# Patient Record
Sex: MALE | Race: BLACK OR AFRICAN AMERICAN | Employment: OTHER | URBAN - METROPOLITAN AREA
[De-identification: names, ages, dates, MRNs, and addresses within clinical notes are randomized per-mention and may not be internally consistent; named-entity substitution may affect disease eponyms.]

---

## 2018-03-07 ENCOUNTER — HOSPITAL ENCOUNTER (OUTPATIENT)
Dept: LAB | Age: 63
Discharge: HOME | End: 2018-03-07
Payer: MEDICARE

## 2018-03-07 ENCOUNTER — HOSPITAL ENCOUNTER (OUTPATIENT)
Dept: RADIATION THERAPY | Age: 63
Discharge: HOME | End: 2018-03-07
Payer: MEDICARE

## 2018-03-07 ENCOUNTER — HOSPITAL ENCOUNTER (OUTPATIENT)
Dept: PREADMISSION TESTING | Age: 63
Discharge: HOME | End: 2018-03-07
Payer: MEDICARE

## 2018-03-07 DIAGNOSIS — C61 MALIGNANT NEOPLASM OF PROSTATE (HCC): ICD-10-CM

## 2018-03-07 DIAGNOSIS — N39.44 NOCTURNAL ENURESIS: ICD-10-CM

## 2018-03-07 LAB
ANION GAP SERPL CALC-SCNC: 5 MMOL/L (ref 3–18)
APPEARANCE UR: CLEAR
BACTERIA URNS QL MICRO: NEGATIVE /HPF
BASOPHILS # BLD: 0 K/UL (ref 0–0.1)
BASOPHILS NFR BLD: 0 % (ref 0–3)
BILIRUB UR QL: NEGATIVE
BLASTS NFR BLD MANUAL: 0 %
BUN SERPL-MCNC: 21 MG/DL (ref 7–18)
BUN/CREAT SERPL: 14 (ref 12–20)
CALCIUM SERPL-MCNC: 8.7 MG/DL (ref 8.5–10.1)
CHLORIDE SERPL-SCNC: 106 MMOL/L (ref 100–108)
CO2 SERPL-SCNC: 31 MMOL/L (ref 21–32)
COLOR UR: YELLOW
CREAT SERPL-MCNC: 1.47 MG/DL (ref 0.6–1.3)
DIFFERENTIAL METHOD BLD: ABNORMAL
EOSINOPHIL # BLD: 0.1 K/UL (ref 0–0.4)
EOSINOPHIL NFR BLD: 1 % (ref 0–5)
EPITH CASTS URNS QL MICRO: ABNORMAL /LPF (ref 0–5)
ERYTHROCYTE [DISTWIDTH] IN BLOOD BY AUTOMATED COUNT: 14 % (ref 11.6–14.5)
GLUCOSE SERPL-MCNC: 284 MG/DL (ref 74–99)
GLUCOSE UR STRIP.AUTO-MCNC: >1000 MG/DL
HCT VFR BLD AUTO: 45.2 % (ref 36–48)
HGB BLD-MCNC: 15 G/DL (ref 13–16)
HGB UR QL STRIP: ABNORMAL
KETONES UR QL STRIP.AUTO: NEGATIVE MG/DL
LEUKOCYTE ESTERASE UR QL STRIP.AUTO: NEGATIVE
LYMPHOCYTES # BLD: 1.9 K/UL (ref 0.8–3.5)
LYMPHOCYTES NFR BLD: 28 % (ref 20–51)
MANUAL DIFFERENTIAL PERFORMED BLD QL: ABNORMAL
MCH RBC QN AUTO: 31.3 PG (ref 24–34)
MCHC RBC AUTO-ENTMCNC: 33.2 G/DL (ref 31–37)
MCV RBC AUTO: 94.2 FL (ref 74–97)
METAMYELOCYTES NFR BLD MANUAL: 0 %
MONOCYTES # BLD: 0.8 K/UL (ref 0–1)
MONOCYTES NFR BLD: 11 % (ref 2–9)
MYELOCYTES NFR BLD MANUAL: 0 %
NEUTS BAND NFR BLD MANUAL: 0 % (ref 0–5)
NEUTS SEG # BLD: 4.1 K/UL (ref 1.8–8)
NEUTS SEG NFR BLD: 60 % (ref 42–75)
NITRITE UR QL STRIP.AUTO: NEGATIVE
PH UR STRIP: 5 [PH] (ref 5–8)
PLATELET # BLD AUTO: 214 K/UL (ref 135–420)
PLATELET COMMENTS,PCOM: ABNORMAL
PMV BLD AUTO: 10.4 FL (ref 9.2–11.8)
POTASSIUM SERPL-SCNC: 4.4 MMOL/L (ref 3.5–5.5)
PROMYELOCYTES NFR BLD MANUAL: 0 %
PROT UR STRIP-MCNC: NEGATIVE MG/DL
RBC # BLD AUTO: 4.8 M/UL (ref 4.7–5.5)
RBC #/AREA URNS HPF: 0 /HPF (ref 0–5)
RBC MORPH BLD: ABNORMAL
SODIUM SERPL-SCNC: 142 MMOL/L (ref 136–145)
SP GR UR REFRACTOMETRY: >1.03 (ref 1–1.03)
UROBILINOGEN UR QL STRIP.AUTO: 0.2 EU/DL (ref 0.2–1)
WBC # BLD AUTO: 6.9 K/UL (ref 4.6–13.2)
WBC URNS QL MICRO: ABNORMAL /HPF (ref 0–4)

## 2018-03-07 PROCEDURE — 84154 ASSAY OF PSA FREE: CPT | Performed by: RADIOLOGY

## 2018-03-07 PROCEDURE — 87086 URINE CULTURE/COLONY COUNT: CPT | Performed by: RADIOLOGY

## 2018-03-07 PROCEDURE — 85027 COMPLETE CBC AUTOMATED: CPT | Performed by: RADIOLOGY

## 2018-03-07 PROCEDURE — 80048 BASIC METABOLIC PNL TOTAL CA: CPT | Performed by: RADIOLOGY

## 2018-03-07 PROCEDURE — 81001 URINALYSIS AUTO W/SCOPE: CPT | Performed by: RADIOLOGY

## 2018-03-07 PROCEDURE — 84402 ASSAY OF FREE TESTOSTERONE: CPT | Performed by: RADIOLOGY

## 2018-03-07 PROCEDURE — 36415 COLL VENOUS BLD VENIPUNCTURE: CPT | Performed by: RADIOLOGY

## 2018-03-07 PROCEDURE — 93005 ELECTROCARDIOGRAM TRACING: CPT

## 2018-03-07 PROCEDURE — 99211 OFF/OP EST MAY X REQ PHY/QHP: CPT

## 2018-03-08 LAB
ATRIAL RATE: 76 BPM
CALCULATED P AXIS, ECG09: 58 DEGREES
CALCULATED R AXIS, ECG10: -83 DEGREES
CALCULATED T AXIS, ECG11: 90 DEGREES
DIAGNOSIS, 93000: NORMAL
P-R INTERVAL, ECG05: 166 MS
PSA FREE MFR SERPL: 15 %
PSA FREE SERPL-MCNC: 0.51 NG/ML
PSA SERPL-MCNC: 3.4 NG/ML (ref 0–4)
Q-T INTERVAL, ECG07: 486 MS
QRS DURATION, ECG06: 206 MS
QTC CALCULATION (BEZET), ECG08: 546 MS
VENTRICULAR RATE, ECG03: 76 BPM

## 2018-03-09 LAB
BACTERIA SPEC CULT: NORMAL
SERVICE CMNT-IMP: NORMAL

## 2018-03-11 LAB
TESTOST FREE SERPL-MCNC: 8.2 PG/ML (ref 6.6–18.1)
TESTOST SERPL-MCNC: 199.3 NG/DL (ref 264–916)

## 2018-04-16 ENCOUNTER — HOSPITAL ENCOUNTER (OUTPATIENT)
Dept: LAB | Age: 63
Discharge: HOME | End: 2018-04-16

## 2019-03-12 ENCOUNTER — OFFICE VISIT (OUTPATIENT)
Dept: INTERNAL MEDICINE CLINIC | Age: 64
End: 2019-03-12

## 2019-03-12 ENCOUNTER — DOCUMENTATION ONLY (OUTPATIENT)
Dept: INTERNAL MEDICINE CLINIC | Age: 64
End: 2019-03-12

## 2019-03-12 VITALS
OXYGEN SATURATION: 96 % | TEMPERATURE: 96.3 F | WEIGHT: 294 LBS | BODY MASS INDEX: 41.16 KG/M2 | SYSTOLIC BLOOD PRESSURE: 144 MMHG | HEIGHT: 71 IN | HEART RATE: 68 BPM | DIASTOLIC BLOOD PRESSURE: 78 MMHG | RESPIRATION RATE: 17 BRPM

## 2019-03-12 DIAGNOSIS — I10 ESSENTIAL HYPERTENSION: ICD-10-CM

## 2019-03-12 DIAGNOSIS — E11.69 TYPE 2 DIABETES MELLITUS WITH OTHER SPECIFIED COMPLICATION, WITH LONG-TERM CURRENT USE OF INSULIN (HCC): Primary | ICD-10-CM

## 2019-03-12 DIAGNOSIS — I50.9 CONGESTIVE HEART FAILURE, UNSPECIFIED HF CHRONICITY, UNSPECIFIED HEART FAILURE TYPE (HCC): ICD-10-CM

## 2019-03-12 DIAGNOSIS — Z79.4 TYPE 2 DIABETES MELLITUS WITH OTHER SPECIFIED COMPLICATION, WITH LONG-TERM CURRENT USE OF INSULIN (HCC): Primary | ICD-10-CM

## 2019-03-12 DIAGNOSIS — Z95.0 PACEMAKER: ICD-10-CM

## 2019-03-12 DIAGNOSIS — C61 PROSTATE CANCER (HCC): ICD-10-CM

## 2019-03-12 RX ORDER — INSULIN GLARGINE 100 [IU]/ML
60 INJECTION, SOLUTION SUBCUTANEOUS
Qty: 15 ADJUSTABLE DOSE PRE-FILLED PEN SYRINGE | Refills: 3
Start: 2019-03-12 | End: 2019-08-12 | Stop reason: SDUPTHER

## 2019-03-12 RX ORDER — TADALAFIL 5 MG/1
TABLET, FILM COATED ORAL
COMMUNITY
Start: 2019-02-27 | End: 2019-10-08

## 2019-03-12 RX ORDER — BUMETANIDE 1 MG/1
1 TABLET ORAL 2 TIMES DAILY
Qty: 180 TAB | Refills: 3
Start: 2019-03-12 | End: 2019-05-07 | Stop reason: SDUPTHER

## 2019-03-12 RX ORDER — DOCUSATE SODIUM 100 MG/1
100 CAPSULE, LIQUID FILLED ORAL DAILY
Qty: 30 CAP | Refills: 2
Start: 2019-03-12 | End: 2019-06-10

## 2019-03-12 NOTE — PROGRESS NOTES
HISTORY OF PRESENT ILLNESS  Huy Degroot is a 75 y.o. male. 76 yo male here to establish care. CHF/pacer. Recent hospital stay due to fluid overload. Notes he had not been as compliant with diuretic, but doing better with change in Bumex from 2mg daily to 1mg BID. Still with some LE edema. DM: followed by endo. Believes last A1c was in 7% range. Avg glc for last 30 days ~ 140 per his monitor. HTN typically controlled on current medication. H/o prostate CA tx with proton therapy. H/o gout, no recent flares. SH: former smoker, 30 pack years. UTD with colo      Establish Care   Pertinent negatives include no chest pain, no headaches and no shortness of breath. Review of Systems   Constitutional: Negative for chills, fever and weight loss. HENT: Negative for congestion and ear pain. Eyes: Negative for blurred vision and pain. Respiratory: Negative for cough and shortness of breath. Cardiovascular: Positive for leg swelling (stable). Negative for chest pain and palpitations. Gastrointestinal: Negative for nausea and vomiting. Genitourinary: Negative for frequency and urgency. Musculoskeletal: Negative for joint pain and myalgias. Skin: Negative for itching and rash. Neurological: Negative for dizziness, tingling and headaches. Psychiatric/Behavioral: Negative for depression. The patient is not nervous/anxious. Past Medical History:   Diagnosis Date    [facility]fib ([facility]     Diabetes ([facility]     Elevated PSA     Erectile disorder due to medical condition in male    [facility] Gout     High cholesterol     Hx-TIA (transient ischemic attack)     Hypertension     Pacemaker     Prostate cancer ([facility] 02/08/2018    Clinical staging T1c [place] score 7 and 6. pre-bx PSA 4.65 ng/mL. Past Surgical History:   Procedure Laterality Date    HX CIRCUMCISION  2014    HX COLONOSCOPY      HX OTHER SURGICAL Right 2011    fractured ankle.  Has 1 screw    HX PACEMAKER PLACEMENT  2012    HX UROLOGICAL  2018    PNBx. TRUS Vol 54 grams. [place] 3+4 in RLM. [place] 3+3 in /12 cores. Dr. Leilani Ritchie. Current Outpatient Medications on File Prior to Visit   Medication Sig Dispense Refill    CIALIS 5 mg tablet       tamsulosin (FLOMAX) 0.4 mg capsule Take 0.4 mg by mouth daily.  SITagliptin-metFORMIN (JANUMET)  mg per tablet Take 1 Tab by mouth two (2) times daily (with meals).  empagliflozin (JARDIANCE) 25 mg tablet Take 25 mg by mouth daily.  allopurinol (ZYLOPRIM) 100 mg tablet Take 100 mg by mouth daily.  APIDRA SOLOSTAR 100 unit/mL pen Indications: sliding scale      losartan (COZAAR) 100 mg tablet Take 100 mg by mouth daily.  metoprolol succinate (TOPROL-XL) 100 mg tablet Take 100 mg by mouth daily.  XARELTO 20 mg tab tablet Take 20 mg by mouth daily (with breakfast).  simvastatin (ZOCOR) 10 mg tablet Take 10 mg by mouth nightly.  budesonide-formoterol (SYMBICORT) 80-4.5 mcg/actuation HFAA Take 2 Puffs by inhalation two (2) times a day.  IPRATROPIUM/ALBUTEROL SULFATE (COMBIVENT IN) Take 2 Puffs by inhalation as needed.  OTHER,NON-FORMULARY, 1 mL by IntraCAVernosal route as needed. 5 Each 5     No current facility-administered medications on file prior to visit. No Known Allergies  Social History     Tobacco Use    Smoking status: Former Smoker     Types: Cigarettes     Last attempt to quit: 2014     Years since quittin.1    Smokeless tobacco: Never Used   Substance Use Topics    Alcohol use: Yes     Alcohol/week: 0.6 oz     Types: 1 Shots of liquor per week     Comment: occasionally    Drug use: No     Family History   Problem Relation Age of Onset    Stroke Mother     Heart Attack Father      Physical Exam   Constitutional: He appears well-developed and well-nourished. No distress.    /78   Pulse 68   Temp 96.3 °F (35.7 °C) (Oral)   Resp 17   Ht 5' 11\" (1.803 m)   Wt 294 lb (133.4 kg)   SpO2 96%   BMI 41.00 kg/m²      Eyes: EOM are normal. Right eye exhibits no discharge. Left eye exhibits no discharge. No scleral icterus. Neck: Neck supple. Cardiovascular: Normal rate, regular rhythm and normal heart sounds. Exam reveals no gallop and no friction rub. No murmur heard. Pulmonary/Chest: Effort normal and breath sounds normal. No respiratory distress. He has no wheezes. He has no rales. Abdominal: Soft. He exhibits no distension. There is no tenderness. There is no rebound and no guarding. Musculoskeletal: He exhibits edema (trace LE). He exhibits no tenderness. Lymphadenopathy:     He has no cervical adenopathy. Neurological: He is alert. He exhibits normal muscle tone. Skin: Skin is warm and dry. Psychiatric: He has a normal mood and affect. Lab Results   Component Value Date/Time    Prostate Specific Ag 1.69 11/16/2018 10:14 AM    Prostate Specific Ag 3.1 04/30/2018    Prostate Specific Ag 3.4 03/07/2018 04:12 PM    Prostate Specific Ag 4.08 12/14/2017 10:35 AM    Prostate Specific Ag 4.65 (H) 11/17/2017 11:03 AM     Lab Results   Component Value Date/Time    Sodium 142 03/07/2018 04:12 PM    Potassium 4.4 03/07/2018 04:12 PM    Chloride 106 03/07/2018 04:12 PM    CO2 31 03/07/2018 04:12 PM    Anion gap 5 03/07/2018 04:12 PM    Glucose 284 (H) 03/07/2018 04:12 PM    BUN 21 (H) 03/07/2018 04:12 PM    Creatinine 1.47 (H) 03/07/2018 04:12 PM    BUN/Creatinine ratio 14 03/07/2018 04:12 PM    GFR est AA 58 (L) 03/07/2018 04:12 PM    GFR est non-AA 48 (L) 03/07/2018 04:12 PM    Calcium 8.7 03/07/2018 04:12 PM   No results found for: HBA1C, HGBE8, KFX4CXAO, CVD9PQMH  Lab Results   Component Value Date/Time    WBC 6.9 03/07/2018 04:12 PM    HGB 15.0 03/07/2018 04:12 PM    HCT 45.2 03/07/2018 04:12 PM    PLATELET 790 21/82/1298 04:12 PM    MCV 94.2 03/07/2018 04:12 PM     ASSESSMENT and PLAN    ICD-10-CM ICD-9-CM    1.  Type 2 diabetes mellitus with other specified complication, with long-term current use of insulin ([facility] E11.69 250.80     Z79.4 V58.67    2. Essential hypertension I10 401.9    3. Prostate cancer ([facility] C61 185    4. Pacemaker Z95.0 V45.01    5. Congestive heart failure, unspecified HF chronicity, unspecified heart failure type (HCC) I50.9 428.0    6. BMI 40.0-44.9, adult [facility]) Z68.41 V85.41      Will obtain old records  Continue with current medication for now. Discussed low sodium diet, daily weights. DM stable on home monitoring. Continue to work on dietary changes, weight loss. F/u with cardiology as planned.

## 2019-03-12 NOTE — PROGRESS NOTES
Pharmacist Note: Immunization Update    Patient: Jones Fair (5 y.o., 1955)     Patient's immunization history was updated to reflect information contained in the [place] and/or outside immunization/pharmacy records were reconciled within [facility]. Health Maintenance schedule updated when appropriate.     Current immunizations now reflect:       Immunization History   Administered Date(s) Administered    Influenza Vaccine 02/02/2014    Pneumococcal Polysaccharide (PPSV-23) 02/03/2014       Danny Espinosa, PharmD, BCACP

## 2019-03-12 NOTE — PATIENT INSTRUCTIONS
Low Sodium Diet (2,000 Milligram): Care Instructions  Your Care Instructions    Too much sodium causes your body to hold on to extra water. This can raise your blood pressure and force your heart and kidneys to work harder. In very serious cases, this could cause you to be put in the hospital. It might even be life-threatening. By limiting sodium, you will feel better and lower your risk of serious problems. The most common source of sodium is salt. People get most of the salt in their diet from canned, prepared, and packaged foods. Fast food and restaurant meals also are very high in sodium. Your doctor will probably limit your sodium to less than 2,000 milligrams (mg) a day. This limit counts all the sodium in prepared and packaged foods and any salt you add to your food. Follow-up care is a key part of your treatment and safety. Be sure to make and go to all appointments, and call your doctor if you are having problems. It's also a good idea to know your test results and keep a list of the medicines you take. How can you care for yourself at home? Read food labels  · Read labels on cans and food packages. The labels tell you how much sodium is in each serving. Make sure that you look at the serving size. If you eat more than the serving size, you have eaten more sodium. · Food labels also tell you the Percent Daily Value for sodium. Choose products with low Percent Daily Values for sodium. · Be aware that sodium can come in forms other than salt, including monosodium glutamate (MSG), sodium citrate, and sodium bicarbonate (baking soda). MSG is often added to Asian food. When you eat out, you can sometimes ask for food without MSG or added salt. Buy low-sodium foods  · Buy foods that are labeled \"unsalted\" (no salt added), \"sodium-free\" (less than 5 mg of sodium per serving), or \"low-sodium\" (less than 140 mg of sodium per serving).  Foods labeled \"reduced-sodium\" and \"light sodium\" may still have too much sodium. Be sure to read the label to see how much sodium you are getting. · Buy fresh vegetables, or frozen vegetables without added sauces. Buy low-sodium versions of canned vegetables, soups, and other canned goods. Prepare low-sodium meals  · Cut back on the amount of salt you use in cooking. This will help you adjust to the taste. Do not add salt after cooking. One teaspoon of salt has about 2,300 mg of sodium. · Take the salt shaker off the table. · Flavor your food with garlic, lemon juice, onion, vinegar, herbs, and spices. Do not use soy sauce, lite soy sauce, steak sauce, onion salt, garlic salt, celery salt, mustard, or ketchup on your food. · Use low-sodium salad dressings, sauces, and ketchup. Or make your own salad dressings and sauces without adding salt. · Use less salt (or none) when recipes call for it. You can often use half the salt a recipe calls for without losing flavor. Other foods such as rice, pasta, and grains do not need added salt. · Rinse canned vegetables, and cook them in fresh water. This removes somebut not allof the salt. · Avoid water that is naturally high in sodium or that has been treated with water softeners, which add sodium. Call your local water company to find out the sodium content of your water supply. If you buy bottled water, read the label and choose a sodium-free brand. Avoid high-sodium foods  · Avoid eating:  ? Smoked, cured, salted, and canned meat, fish, and poultry. ? Ham, samson, hot dogs, and luncheon meats. ? Regular, hard, and processed cheese and regular peanut butter. ? Crackers with salted tops, and other salted snack foods such as pretzels, chips, and salted popcorn. ? Frozen prepared meals, unless labeled low-sodium. ? Canned and dried soups, broths, and bouillon, unless labeled sodium-free or low-sodium. ? Canned vegetables, unless labeled sodium-free or low-sodium. ? Genette Amita fries, pizza, tacos, and other fast foods.   ? Ammy García, olives, ketchup, and other condiments, especially soy sauce, unless labeled sodium-free or low-sodium. Where can you learn more? Go to http://harjinder-serge.info/. Enter V647 in the search box to learn more about \"Low Sodium Diet (2,000 Milligram): Care Instructions. \"  Current as of: March 28, 2018  Content Version: 11.9  © 8631-6852 [employer], comment.com. Care instructions adapted under license by [employer] (which disclaims liability or warranty for this information). If you have questions about a medical condition or this instruction, always ask your healthcare professional. [address] any warranty or liability for your use of this information.

## 2019-03-12 NOTE — PROGRESS NOTES
ROOM #  [id number]  Identified pt with two pt identifiers(name and ). Reviewed record in preparation for visit and have obtained necessary documentation. Chief Complaint   Patient presents with   [address] preferred language for health care discussion is english/other. Is the patient using any DME equipment during OV? NO    Patricia Gonzales is due for:  Health Maintenance Due   Topic    Hepatitis C Screening     HEMOGLOBIN A1C Q6M     LIPID PANEL Q1     FOOT EXAM Q1     MICROALBUMIN Q1     EYE EXAM RETINAL OR DILATED     DTaP/Tdap/Td series (1 - Tdap)    Shingrix Vaccine Age 50> (1 of 2)    FOBT Q 1 YEAR AGE 54-65     GLAUCOMA SCREENING Q2Y     Pneumococcal 65+ Low/Medium Risk (1 of 2 - PCV13)    MEDICARE YEARLY EXAM     Influenza Age 5 to Adult      Health Maintenance reviewed and discussed per provider  Please order/place referral if appropriate. Advance Directive:  1. Do you have an advance directive in place? Patient Reply: NO    2. If not, would you like material regarding how to put one in place? NO    Coordination of Care:  1. Have you been to the ER, urgent care clinic since your last visit? Hospitalized since your last visit? Yes [facility]  In 2019 for breathing problem     2. Have you seen or consulted any other health care providers outside of the [address] [place] [address] since your last visit? Include any pap smears or colon screening. NO    Patient is accompanied by wife I have received verbal consent from Patricia Gonzales to discuss any/all medical information while they are present in the room.     Learning Assessment:  Learning Assessment 3/12/2019   PRIMARY LEARNER Patient   HIGHEST LEVEL OF EDUCATION - PRIMARY LEARNER  GRADUATED HIGH SCHOOL OR GED   BARRIERS PRIMARY LEARNER NONE   CO-LEARNER CAREGIVER No   PRIMARY LANGUAGE ENGLISH   LEARNER PREFERENCE PRIMARY READING   ANSWERED BY patient   RELATIONSHIP SELF     Depression Screening:  3 most recent PHQ Screens 3/12/2019   Little interest or pleasure in doing things Not at all   Feeling down, depressed, irritable, or hopeless Not at all   Total Score PHQ 2 0     Abuse Screening:  Abuse Screening Questionnaire 3/12/2019   Do you ever feel afraid of your partner? N   Are you in a relationship with someone who physically or mentally threatens you? N   Is it safe for you to go home? Y     Fall Risk  Fall Risk Assessment, last 12 mths 3/12/2019   Able to walk? Yes   Fall in past 12 months?  No

## 2019-05-07 ENCOUNTER — OFFICE VISIT (OUTPATIENT)
Dept: INTERNAL MEDICINE CLINIC | Age: 64
End: 2019-05-07

## 2019-05-07 ENCOUNTER — TELEPHONE (OUTPATIENT)
Dept: INTERNAL MEDICINE CLINIC | Age: 64
End: 2019-05-07

## 2019-05-07 VITALS
RESPIRATION RATE: 18 BRPM | HEART RATE: 83 BPM | BODY MASS INDEX: 40.74 KG/M2 | DIASTOLIC BLOOD PRESSURE: 70 MMHG | TEMPERATURE: 97.9 F | WEIGHT: 291 LBS | OXYGEN SATURATION: 90 % | SYSTOLIC BLOOD PRESSURE: 123 MMHG | HEIGHT: 71 IN

## 2019-05-07 DIAGNOSIS — I10 ESSENTIAL HYPERTENSION: ICD-10-CM

## 2019-05-07 DIAGNOSIS — I50.9 CONGESTIVE HEART FAILURE, UNSPECIFIED HF CHRONICITY, UNSPECIFIED HEART FAILURE TYPE (HCC): ICD-10-CM

## 2019-05-07 DIAGNOSIS — J44.9 CHRONIC OBSTRUCTIVE PULMONARY DISEASE, UNSPECIFIED COPD TYPE (HCC): Primary | ICD-10-CM

## 2019-05-07 RX ORDER — BUDESONIDE AND FORMOTEROL FUMARATE DIHYDRATE 80; 4.5 UG/1; UG/1
2 AEROSOL RESPIRATORY (INHALATION) 2 TIMES DAILY
Qty: 3 INHALER | Refills: 3 | Status: SHIPPED | OUTPATIENT
Start: 2019-05-07 | End: 2019-06-05 | Stop reason: ALTCHOICE

## 2019-05-07 RX ORDER — BUMETANIDE 1 MG/1
1 TABLET ORAL 2 TIMES DAILY
Qty: 180 TAB | Refills: 3 | Status: SHIPPED | OUTPATIENT
Start: 2019-05-07 | End: 2022-04-25 | Stop reason: SDUPTHER

## 2019-05-07 RX ORDER — SPIRONOLACTONE 25 MG/1
25 TABLET ORAL 2 TIMES DAILY
Qty: 180 TAB | Refills: 3 | Status: SHIPPED | OUTPATIENT
Start: 2019-05-07 | End: 2020-10-06 | Stop reason: SDUPTHER

## 2019-05-07 RX ORDER — TAMSULOSIN HYDROCHLORIDE 0.4 MG/1
0.4 CAPSULE ORAL DAILY
Qty: 90 CAP | Refills: 3 | Status: SHIPPED | OUTPATIENT
Start: 2019-05-07 | End: 2020-05-28 | Stop reason: SDUPTHER

## 2019-05-07 NOTE — TELEPHONE ENCOUNTER
Received call from [address] [place] [address]. [facility]) wanting to get a VO to change the directions of the Combivent. Gave VO to change directions to  1 puff 4x daily, max 6 inhalations per day.

## 2019-05-07 NOTE — PROGRESS NOTES
Rm: 16 
 
Chief Complaint Patient presents with  Medication Refill  Breathing Problem  
  x 1 month Depression Screening: 
3 most recent [facility] Screens 5/7/2019 3/12/2019 Little interest or pleasure in doing things Not at all Not at all Feeling down, depressed, irritable, or hopeless Not at all Not at all Total Score PHQ 2 0 0 Learning Assessment: 
Learning Assessment 3/12/2019 PRIMARY LEARNER Patient HIGHEST LEVEL OF EDUCATION - PRIMARY LEARNER  GRADUATED HIGH SCHOOL OR GED  
BARRIERS PRIMARY LEARNER NONE  
CO-LEARNER CAREGIVER No  
PRIMARY LANGUAGE ENGLISH  
LEARNER PREFERENCE PRIMARY READING  
ANSWERED BY patient RELATIONSHIP SELF Abuse Screening: 
Abuse Screening Questionnaire 3/12/2019 Do you ever feel afraid of your partner? Elana Madison Are you in a relationship with someone who physically or mentally threatens you? Elana Madison Is it safe for you to go home? Edy Estrada Health Maintenance reviewed and discussed per provider: yes Coordination of Care: 1. Have you been to the ER, urgent care clinic since your last visit? Hospitalized since your last visit? no 
 
2. Have you seen or consulted any other health care providers outside of the [address] [place] [address] since your last visit? Include any pap smears or colon screening.  no

## 2019-05-07 NOTE — PROGRESS NOTES
HISTORY OF PRESENT ILLNESS Scott Lacey is a 75 y.o. male. Here for f/u of COPD. Has been SOB since he ran out of his inhalers one month ago. Has been using his wife's but not the same medication. Some increased cough in the morning. No fevers, chills. CHF: needs refills of bumex. Has lost 3 lbs but notes he would like to lose more. Does not weight regularly at home. Last cardiology visit in FEB. No changes at that time. DM followed by [facility] (Dr. Cisco Max). Reports this has been controlled. HTN well controlled at this time. Review of Systems Constitutional: Negative for chills, fever and weight loss. HENT: Negative for congestion and ear pain. Eyes: Negative for blurred vision and pain. Respiratory: Positive for cough (in AM), shortness of breath and wheezing (intermittent). Cardiovascular: Positive for leg swelling. Negative for chest pain and palpitations. Gastrointestinal: Negative for nausea and vomiting. Genitourinary: Negative for frequency and urgency. Musculoskeletal: Negative for joint pain and myalgias. Skin: Negative for itching and rash. Neurological: Negative for dizziness, tingling and headaches. Psychiatric/Behavioral: Negative for depression. The patient is not nervous/anxious. Past Medical History:  
Diagnosis Date  [facility]  Diabetes ([facility]  Elevated PSA  Erectile disorder due to medical condition in male  Gout  High cholesterol  Hx-TIA (transient ischemic attack)  Hypertension  Pacemaker  Prostate cancer ([facility] 02/08/2018 Clinical staging T1c [place] score 7 and 6. pre-bx PSA 4.65 ng/mL. Current Outpatient Medications on File Prior to Visit Medication Sig Dispense Refill  docusate sodium (COLACE) 100 mg capsule Take 1 Cap by mouth daily for 90 days. 30 Cap 2  
 LANTUS SOLOSTAR U-100 INSULIN 100 unit/mL (3 mL) inpn 60 Units by SubCUTAneous route Before breakfast and dinner.  15 Adjustable Dose Pre-filled Pen Syringe 3  
 bumetanide (BUMEX) 1 mg tablet Take 1 Tab by mouth two (2) times a day. 180 Tab 3  
 tamsulosin (FLOMAX) 0.4 mg capsule Take 0.4 mg by mouth daily.  SITagliptin-metFORMIN (JANUMET)  mg per tablet Take 1 Tab by mouth two (2) times daily (with meals).  empagliflozin (JARDIANCE) 25 mg tablet Take 25 mg by mouth daily.  allopurinol (ZYLOPRIM) 100 mg tablet Take 100 mg by mouth daily.  APIDRA SOLOSTAR 100 unit/mL pen Indications: sliding scale  losartan (COZAAR) 100 mg tablet Take 100 mg by mouth daily.  metoprolol succinate (TOPROL-XL) 100 mg tablet Take 100 mg by mouth daily.  XARELTO 20 mg tab tablet Take 20 mg by mouth daily (with breakfast).  simvastatin (ZOCOR) 10 mg tablet Take 10 mg by mouth nightly.  budesonide-formoterol (SYMBICORT) 80-4.5 mcg/actuation HFAA Take 2 Puffs by inhalation two (2) times a day.  IPRATROPIUM/ALBUTEROL SULFATE (COMBIVENT IN) Take 2 Puffs by inhalation as needed.  CIALIS 5 mg tablet  OTHER,NON-FORMULARY, 1 mL by IntraCAVernosal route as needed. 5 Each 5 No current facility-administered medications on file prior to visit. Social History Tobacco Use  Smoking status: Former Smoker Types: Cigarettes Last attempt to quit: 2014 Years since quittin.3  Smokeless tobacco: Never Used Substance Use Topics  Alcohol use: Yes Alcohol/week: 0.6 oz Types: 1 Shots of liquor per week Comment: occasionally  Drug use: No  
 
Physical Exam  
Constitutional: He appears well-developed and well-nourished. No distress. /70 (BP 1 Location: Right arm, BP Patient Position: Sitting)   Pulse 83   Temp 97.9 °F (36.6 °C) (Oral)   Resp 18   Ht 5' 11\" (1.803 m)   Wt 291 lb (132 kg)   SpO2 90%   BMI 40.59 kg/m² Eyes: EOM are normal. Right eye exhibits no discharge. Left eye exhibits no discharge. No scleral icterus. Neck: Neck supple. Cardiovascular: Normal rate, regular rhythm and normal heart sounds. Exam reveals no gallop and no friction rub. No murmur heard. Pulmonary/Chest: Effort normal and breath sounds normal. No respiratory distress. He has no wheezes. He has no rales. Musculoskeletal: He exhibits edema (trace BLE). He exhibits no tenderness. Lymphadenopathy:  
  He has no cervical adenopathy. Neurological: He is alert. He exhibits normal muscle tone. Skin: Skin is warm and dry. Psychiatric: He has a normal mood and affect. Lab Results Component Value Date/Time Sodium 142 03/07/2018 04:12 PM  
 Potassium 4.4 03/07/2018 04:12 PM  
 Chloride 106 03/07/2018 04:12 PM  
 CO2 31 03/07/2018 04:12 PM  
 Anion gap 5 03/07/2018 04:12 PM  
 Glucose 284 (H) 03/07/2018 04:12 PM  
 BUN 21 (H) 03/07/2018 04:12 PM  
 Creatinine 1.47 (H) 03/07/2018 04:12 PM  
 BUN/Creatinine ratio 14 03/07/2018 04:12 PM  
 GFR est AA 58 (L) 03/07/2018 04:12 PM  
 GFR est non-AA 48 (L) 03/07/2018 04:12 PM  
 Calcium 8.7 03/07/2018 04:12 PM  
No results found for: HBA1C, HGBE8, KQS1FDQJ, BVC9NKGG  
 
ASSESSMENT and PLAN 
  ICD-10-CM ICD-9-CM 1. Chronic obstructive pulmonary disease, unspecified COPD type ([facility] J44.9 496 2. Essential hypertension I10 401.9 3. Congestive heart failure, unspecified HF chronicity, unspecified heart failure type ([facility]) I50.9 428.0 4. BMI 40.0-44.9, adult ([facility]) Z68.41 V85.41   
 
COPD with exacerbation at this time due to medication compliance. Rx printed at his request and he will fill today. Discussed calling to avoid running out of med in future. Will continue with current medication for now. Discussed importance of watching sodium in his diet. Provided info on AVS. Asked that he weigh himself regularly and monitor edema.

## 2019-05-07 NOTE — PATIENT INSTRUCTIONS
Low Sodium Diet (2,000 Milligram): Care Instructions Your Care Instructions Too much sodium causes your body to hold on to extra water. This can raise your blood pressure and force your heart and kidneys to work harder. In very serious cases, this could cause you to be put in the hospital. It might even be life-threatening. By limiting sodium, you will feel better and lower your risk of serious problems. The most common source of sodium is salt. People get most of the salt in their diet from canned, prepared, and packaged foods. Fast food and restaurant meals also are very high in sodium. Your doctor will probably limit your sodium to less than 2,000 milligrams (mg) a day. This limit counts all the sodium in prepared and packaged foods and any salt you add to your food. Follow-up care is a key part of your treatment and safety. Be sure to make and go to all appointments, and call your doctor if you are having problems. It's also a good idea to know your test results and keep a list of the medicines you take. How can you care for yourself at home? Read food labels · Read labels on cans and food packages. The labels tell you how much sodium is in each serving. Make sure that you look at the serving size. If you eat more than the serving size, you have eaten more sodium. · Food labels also tell you the Percent Daily Value for sodium. Choose products with low Percent Daily Values for sodium. · Be aware that sodium can come in forms other than salt, including monosodium glutamate (MSG), sodium citrate, and sodium bicarbonate (baking soda). MSG is often added to Asian food. When you eat out, you can sometimes ask for food without MSG or added salt. Buy low-sodium foods · Buy foods that are labeled \"unsalted\" (no salt added), \"sodium-free\" (less than 5 mg of sodium per serving), or \"low-sodium\" (less than 140 mg of sodium per serving).  Foods labeled \"reduced-sodium\" and \"light sodium\" may still have too much sodium. Be sure to read the label to see how much sodium you are getting. · Buy fresh vegetables, or frozen vegetables without added sauces. Buy low-sodium versions of canned vegetables, soups, and other canned goods. Prepare low-sodium meals · Cut back on the amount of salt you use in cooking. This will help you adjust to the taste. Do not add salt after cooking. One teaspoon of salt has about 2,300 mg of sodium. · Take the salt shaker off the table. · Flavor your food with garlic, lemon juice, onion, vinegar, herbs, and spices. Do not use soy sauce, lite soy sauce, steak sauce, onion salt, garlic salt, celery salt, mustard, or ketchup on your food. · Use low-sodium salad dressings, sauces, and ketchup. Or make your own salad dressings and sauces without adding salt. · Use less salt (or none) when recipes call for it. You can often use half the salt a recipe calls for without losing flavor. Other foods such as rice, pasta, and grains do not need added salt. · Rinse canned vegetables, and cook them in fresh water. This removes somebut not allof the salt. · Avoid water that is naturally high in sodium or that has been treated with water softeners, which add sodium. Call your local water company to find out the sodium content of your water supply. If you buy bottled water, read the label and choose a sodium-free brand. Avoid high-sodium foods · Avoid eating: 
? Smoked, cured, salted, and canned meat, fish, and poultry. ? Ham, samson, hot dogs, and luncheon meats. ? Regular, hard, and processed cheese and regular peanut butter. ? Crackers with salted tops, and other salted snack foods such as pretzels, chips, and salted popcorn. ? Frozen prepared meals, unless labeled low-sodium. ? Canned and dried soups, broths, and bouillon, unless labeled sodium-free or low-sodium. ? Canned vegetables, unless labeled sodium-free or low-sodium. ? Western Dimple fries, pizza, tacos, and other fast foods. ? Pickles, olives, ketchup, and other condiments, especially soy sauce, unless labeled sodium-free or low-sodium. Where can you learn more? Go to http://harjinder-serge.info/. Enter Z795 in the search box to learn more about \"Low Sodium Diet (2,000 Milligram): Care Instructions. \" Current as of: March 28, 2018 Content Version: 11.9 © 0587-7592 [employer]. Care instructions adapted under license by [facility] (which disclaims liability or warranty for this information). If you have questions about a medical condition or this instruction, always ask your healthcare professional. [address] any warranty or liability for your use of this information.

## 2019-06-04 ENCOUNTER — TELEPHONE (OUTPATIENT)
Dept: INTERNAL MEDICINE CLINIC | Age: 64
End: 2019-06-04

## 2019-06-04 NOTE — TELEPHONE ENCOUNTER
47 Karla Haines contacted. 2 patient identifiers confirmed. Was informed that patient needs actual trial with Advair before insurance will cover this medication Symbicort. Patient has listed that she used \"borrowed Advair\" but patient must have been prescribed Advair. Please be advised.

## 2019-06-04 NOTE — TELEPHONE ENCOUNTER
Wanda with [address] is calling in reference to a PA on the [employer].   Can be reached a t [phone]

## 2019-06-05 RX ORDER — FLUTICASONE PROPIONATE AND SALMETEROL 500; 50 UG/1; UG/1
1 POWDER RESPIRATORY (INHALATION) EVERY 12 HOURS
Qty: 1 INHALER | Refills: 3 | Status: SHIPPED | OUTPATIENT
Start: 2019-06-05 | End: 2019-06-12 | Stop reason: SINTOL

## 2019-06-05 NOTE — TELEPHONE ENCOUNTER
Please inform patient that Advair will be ordered and we can be notified if exacerbation due to change

## 2019-06-05 NOTE — TELEPHONE ENCOUNTER
Call made to pt, both name and  verified. Pt was advised that Dr. Drew Epley sent Advair to the pharmacy and that once he has treid and fail then we can get insurance company to cover Symbicort. Pt verbalized understanding and stated that he would pick Advair up when he returns from out of town on Monday.

## 2019-06-10 ENCOUNTER — TELEPHONE (OUTPATIENT)
Dept: INTERNAL MEDICINE CLINIC | Age: 64
End: 2019-06-10

## 2019-06-10 NOTE — TELEPHONE ENCOUNTER
Patient is at [facility], Pharmacy states they never received the Advair Rx.   Asking that it please be resent, or verbally called in to [phone] [facility])

## 2019-06-12 ENCOUNTER — OFFICE VISIT (OUTPATIENT)
Dept: INTERNAL MEDICINE CLINIC | Age: 64
End: 2019-06-12

## 2019-06-12 VITALS
OXYGEN SATURATION: 93 % | BODY MASS INDEX: 40.91 KG/M2 | TEMPERATURE: 97.2 F | HEART RATE: 76 BPM | WEIGHT: 292.2 LBS | HEIGHT: 71 IN | SYSTOLIC BLOOD PRESSURE: 126 MMHG | DIASTOLIC BLOOD PRESSURE: 79 MMHG | RESPIRATION RATE: 18 BRPM

## 2019-06-12 DIAGNOSIS — I10 ESSENTIAL HYPERTENSION: ICD-10-CM

## 2019-06-12 DIAGNOSIS — I50.9 CONGESTIVE HEART FAILURE, UNSPECIFIED HF CHRONICITY, UNSPECIFIED HEART FAILURE TYPE (HCC): ICD-10-CM

## 2019-06-12 DIAGNOSIS — J44.9 CHRONIC OBSTRUCTIVE PULMONARY DISEASE, UNSPECIFIED COPD TYPE (HCC): Primary | ICD-10-CM

## 2019-06-12 RX ORDER — BUDESONIDE AND FORMOTEROL FUMARATE DIHYDRATE 80; 4.5 UG/1; UG/1
2 AEROSOL RESPIRATORY (INHALATION) 2 TIMES DAILY
Qty: 3 INHALER | Refills: 3 | Status: SHIPPED | OUTPATIENT
Start: 2019-06-12 | End: 2019-09-12 | Stop reason: CLARIF

## 2019-06-12 NOTE — PATIENT INSTRUCTIONS
Learning About Saving Energy When You Have a Chronic Condition Introduction Everyday tasks can be tiring when you have COPD, heart failure, or another long-term (chronic) condition. You may feel at times that you've lost your ability to live your life. But learning to conserve, or save, your energy can help you be less tired. Conserving your energy means finding ways of doing daily activities with as little effort as possible. With some small changes in the way you do things, you can get your tasks done more easily. Some treatments are available that might help. Pulmonary rehabilitation can teach you ways to breathe easier. Cardiac rehabilitation can help make your heart stronger. You also may want to see an occupational or physical therapist. The therapist can give you more tips on building strength and moving with less effort. What can you do to conserve your energy? Planning · Make a list of what you have to do every day. Group the tasks by location. · Do all the chores in one part of your house around the same time. · Go out for errands or do chores at the time of day when you have the most energy. · Plan rest periods into your day. Getting things done · Sit down as often as you can when you get dressed, do chores, or cook. · Use a cart with wheels to roll items, such as laundry, from one room to another. · Push or slide boxes or other large items instead of lifting them. Reaching and bending · Put things you use the most on shelves that are at the level of your waist or shoulder. · Use long-handled grabbers or other tools to reach items on a high shelf or to  things off the floor. Use long-handled dusters when you clean the house. · Use a raised toilet seat to avoid bending too far to sit or stand up. Eating · Eat several small meals instead of three larger meals.  
· If you get too tired to eat much, try to choose healthy foods that have more calories. Have a yogurt-and-fruit smoothie for breakfast. Put avocado on a sandwich. Or add cheese or peanut butter to snacks. · If you don't feel very hungry, try to eat first and drink water or other fluids later, after a meal. This can help keep you from losing weight. Sip small amounts of fluids if you need to drink while you eat. Having sex · Choose the time of day when you have more energy. · A myms-lj-[id number] position for sex can be less tiring. Sometimes you may want to focus more on caressing. Watch closely for changes in your health, and be sure to contact your doctor if you have any problems. Where can you learn more? Go to http://harjinder-serge.info/. Enter H190 in the search box to learn more about \"Learning About Saving Energy When You Have a Chronic Condition. \" Current as of: September 5, 2018 Content Version: 11.9 © 0879-2873 [employer]. Care instructions adapted under license by [employer] (which disclaims liability or warranty for this information). If you have questions about a medical condition or this instruction, always ask your healthcare professional. [address] any warranty or liability for your use of this information.

## 2019-06-12 NOTE — PROGRESS NOTES
HISTORY OF PRESENT ILLNESS  Osmany Hurley is a 75 y.o. male. 76 yo male here for f/u of COPD. Started on Advair due to symbicort not being on formulary. Has developed HA and muscle aches since starting. Stable SOB, but better when he takes symbicort  CHF: Has been weighing himself. Weight is stable. Edema improved. HTN well controlled. Review of Systems   Constitutional: Negative for chills, fever and weight loss. HENT: Negative for congestion and ear pain. Eyes: Negative for blurred vision and pain. Respiratory: Positive for shortness of breath. Negative for cough. Cardiovascular: Positive for leg swelling. Negative for chest pain and palpitations. Gastrointestinal: Negative for nausea and vomiting. Genitourinary: Negative for frequency and urgency. Musculoskeletal: Negative for joint pain and myalgias. Skin: Negative for itching and rash. Neurological: Negative for dizziness, tingling and headaches. Psychiatric/Behavioral: Negative for depression. The patient is not nervous/anxious. Past Medical History:   Diagnosis Date    [facility]     Diabetes ([facility]     Elevated PSA     Erectile disorder due to medical condition in male    Jeri.Radter Gout     High cholesterol     Hx-TIA (transient ischemic attack)     Hypertension     Pacemaker     Prostate cancer ([facility] [address] 02/08/2018    Clinical staging T1c [place] score 7 and 6. pre-bx PSA 4.65 ng/mL. Current Outpatient Medications on File Prior to Visit   Medication Sig Dispense Refill    fluticasone propion-salmeterol (ADVAIR/WIXELA) 500-50 mcg/dose diskus inhaler Take 1 Puff by inhalation every twelve (12) hours. 1 Inhaler 3    tamsulosin (FLOMAX) 0.4 mg capsule Take 1 Cap by mouth daily. 90 Cap 3    bumetanide (BUMEX) 1 mg tablet Take 1 Tab by mouth two (2) times a day. 180 Tab 3    ipratropium-albuterol (COMBIVENT RESPIMAT)  mcg/actuation inhaler Take 1 Puff by inhalation every four (4) hours as needed for Wheezing. Indications: Bronchi Muscle Spasm resulting from COPD 3 Inhaler 3    spironolactone (ALDACTONE) 25 mg tablet Take 1 Tab by mouth two (2) times a day. 180 Tab 3    CIALIS 5 mg tablet       LANTUS SOLOSTAR U-100 INSULIN 100 unit/mL (3 mL) inpn 60 Units by SubCUTAneous route Before breakfast and dinner. 15 Adjustable Dose Pre-filled Pen Syringe 3    SITagliptin-metFORMIN (JANUMET)  mg per tablet Take 1 Tab by mouth two (2) times daily (with meals).  empagliflozin (JARDIANCE) 25 mg tablet Take 25 mg by mouth daily.  OTHER,NON-FORMULARY, 1 mL by IntraCAVernosal route as needed. 5 Each 5    allopurinol (ZYLOPRIM) 100 mg tablet Take 100 mg by mouth daily.  APIDRA SOLOSTAR 100 unit/mL pen Indications: sliding scale      losartan (COZAAR) 100 mg tablet Take 100 mg by mouth daily.  metoprolol succinate (TOPROL-XL) 100 mg tablet Take 100 mg by mouth daily.  XARELTO 20 mg tab tablet Take 20 mg by mouth daily (with breakfast).  simvastatin (ZOCOR) 10 mg tablet Take 10 mg by mouth nightly. No current facility-administered medications on file prior to visit. No Known Allergies  Physical Exam   Constitutional: He appears well-developed and well-nourished. No distress. /79 (BP 1 Location: Left arm, BP Patient Position: Sitting)   Pulse 76   Temp 97.2 °F (36.2 °C) (Oral)   Resp 18   Ht 5' 11\" (1.803 m)   Wt 292 lb 3.2 oz (132.5 kg)   SpO2 93%   BMI 40.75 kg/m²      Eyes: EOM are normal. Right eye exhibits no discharge. Left eye exhibits no discharge. No scleral icterus. Neck: Neck supple. Cardiovascular: Normal rate, regular rhythm and normal heart sounds. Exam reveals no gallop and no friction rub. No murmur heard. Pulmonary/Chest: Effort normal and breath sounds normal. No respiratory distress. He has no wheezes. He has no rales. Musculoskeletal: He exhibits edema (trace ). He exhibits no tenderness. Lymphadenopathy:     He has no cervical adenopathy. Neurological: He is alert. He exhibits normal muscle tone. Skin: Skin is warm and dry. Psychiatric: He has a normal mood and affect. Lab Results   Component Value Date/Time    Sodium 142 03/07/2018 04:12 PM    Potassium 4.4 03/07/2018 04:12 PM    Chloride 106 03/07/2018 04:12 PM    CO2 31 03/07/2018 04:12 PM    Anion gap 5 03/07/2018 04:12 PM    Glucose 284 (H) 03/07/2018 04:12 PM    BUN 21 (H) 03/07/2018 04:12 PM    Creatinine 1.47 (H) 03/07/2018 04:12 PM    BUN/Creatinine ratio 14 03/07/2018 04:12 PM    GFR est AA 58 (L) 03/07/2018 04:12 PM    GFR est non-AA 48 (L) 03/07/2018 04:12 PM    Calcium 8.7 03/07/2018 04:12 PM   No results found for: HBA1C, HGBE8, EUV6WPCL, BAT4VMIL    ASSESSMENT and PLAN    ICD-10-CM ICD-9-CM    1. Chronic obstructive pulmonary disease, unspecified COPD type ([facility] [address] J44.9 496    2. Essential hypertension I10 401.9    3. Congestive heart failure, unspecified HF chronicity, unspecified heart failure type ([facility] [address] I50.9 428.0      Symbicort reordered. Will need to complete new PA given his sx on Advair. Otherwise continue with current medication.

## 2019-06-12 NOTE — PROGRESS NOTES
Rm: 16    Chief Complaint   Patient presents with    COPD     Depression Screening:  3 most recent PHQ Screens 6/12/2019 5/7/2019 3/12/2019   Little interest or pleasure in doing things Not at all Not at all Not at all   Feeling down, depressed, irritable, or hopeless Not at all Not at all Not at all   Total Score PHQ 2 0 0 0       Learning Assessment:  Learning Assessment 3/12/2019   PRIMARY LEARNER Patient   HIGHEST LEVEL OF EDUCATION - PRIMARY LEARNER  GRADUATED HIGH SCHOOL OR GED   BARRIERS PRIMARY LEARNER NONE   CO-LEARNER CAREGIVER No   PRIMARY LANGUAGE ENGLISH   LEARNER PREFERENCE PRIMARY READING   ANSWERED BY patient   RELATIONSHIP SELF       Abuse Screening:  Abuse Screening Questionnaire 3/12/2019   Do you ever feel afraid of your partner? N   Are you in a relationship with someone who physically or mentally threatens you? N   Is it safe for you to go home? Y       Health Maintenance reviewed and discussed per provider: yes     Coordination of Care:    1. Have you been to the ER, urgent care clinic since your last visit? Hospitalized since your last visit? no    2. Have you seen or consulted any other health care providers outside of the [address] [place] [address] since your last visit? Include any pap smears or colon screening.  no

## 2019-06-28 ENCOUNTER — OFFICE VISIT (OUTPATIENT)
Dept: INTERNAL MEDICINE CLINIC | Age: 64
End: 2019-06-28

## 2019-06-28 VITALS
BODY MASS INDEX: 41.02 KG/M2 | HEIGHT: 71 IN | HEART RATE: 83 BPM | OXYGEN SATURATION: 96 % | TEMPERATURE: 96.4 F | SYSTOLIC BLOOD PRESSURE: 130 MMHG | DIASTOLIC BLOOD PRESSURE: 87 MMHG | WEIGHT: 293 LBS | RESPIRATION RATE: 18 BRPM

## 2019-06-28 NOTE — PROGRESS NOTES
HISTORY OF PRESENT ILLNESS Brigitte Fuller is a 75 y.o. male. Erroroneous Medication Problem ROS Past Medical History:  
Diagnosis Date  A-fib ([facility] [address]  Diabetes ([facility] [address]  Elevated PSA  Erectile disorder due to medical condition in male  Gout  High cholesterol  Hx-TIA (transient ischemic attack)  Hypertension  Pacemaker  Prostate cancer ([facility] [address] 02/08/2018 Clinical staging T1c [place] score 7 and 6. pre-bx PSA 4.65 ng/mL. Current Outpatient Medications on File Prior to Visit Medication Sig Dispense Refill  budesonide-formoterol (SYMBICORT) 80-4.5 mcg/actuation HFAA Take 2 Puffs by inhalation two (2) times a day. 3 Inhaler 3  
 tamsulosin (FLOMAX) 0.4 mg capsule Take 1 Cap by mouth daily. 90 Cap 3  
 bumetanide (BUMEX) 1 mg tablet Take 1 Tab by mouth two (2) times a day. 180 Tab 3  
 ipratropium-albuterol (COMBIVENT RESPIMAT)  mcg/actuation inhaler Take 1 Puff by inhalation every four (4) hours as needed for Wheezing. Indications: Bronchi Muscle Spasm resulting from COPD 3 Inhaler 3  
 spironolactone (ALDACTONE) 25 mg tablet Take 1 Tab by mouth two (2) times a day. 180 Tab 3  
 CIALIS 5 mg tablet  LANTUS SOLOSTAR U-100 INSULIN 100 unit/mL (3 mL) inpn 60 Units by SubCUTAneous route Before breakfast and dinner. 15 Adjustable Dose Pre-filled Pen Syringe 3  
 SITagliptin-metFORMIN (JANUMET)  mg per tablet Take 1 Tab by mouth two (2) times daily (with meals).  empagliflozin (JARDIANCE) 25 mg tablet Take 25 mg by mouth daily.  OTHER,NON-FORMULARY, 1 mL by IntraCAVernosal route as needed. 5 Each 5  
 allopurinol (ZYLOPRIM) 100 mg tablet Take 100 mg by mouth daily.  APIDRA SOLOSTAR 100 unit/mL pen Indications: sliding scale  losartan (COZAAR) 100 mg tablet Take 100 mg by mouth daily.  metoprolol succinate (TOPROL-XL) 100 mg tablet Take 100 mg by mouth daily.  XARELTO 20 mg tab tablet Take 20 mg by mouth daily (with breakfast).  simvastatin (ZOCOR) 10 mg tablet Take 10 mg by mouth nightly. No current facility-administered medications on file prior to visit. No Known Allergies Physical Exam 
Lab Results Component Value Date/Time Sodium 142 03/07/2018 04:12 PM  
 Potassium 4.4 03/07/2018 04:12 PM  
 Chloride 106 03/07/2018 04:12 PM  
 CO2 31 03/07/2018 04:12 PM  
 Anion gap 5 03/07/2018 04:12 PM  
 Glucose 284 (H) 03/07/2018 04:12 PM  
 BUN 21 (H) 03/07/2018 04:12 PM  
 Creatinine 1.47 (H) 03/07/2018 04:12 PM  
 BUN/Creatinine ratio 14 03/07/2018 04:12 PM  
 GFR est AA 58 (L) 03/07/2018 04:12 PM  
 GFR est non-AA 48 (L) 03/07/2018 04:12 PM  
 Calcium 8.7 03/07/2018 04:12 PM  
 
ASSESSMENT and PLAN 
{ASSESSMENT/PLAN:54536}

## 2019-07-15 RX ORDER — LOSARTAN POTASSIUM 100 MG/1
100 TABLET ORAL DAILY
Qty: 90 TAB | Refills: 0 | Status: SHIPPED | OUTPATIENT
Start: 2019-07-15 | End: 2019-10-30 | Stop reason: SDUPTHER

## 2019-07-15 RX ORDER — METOPROLOL SUCCINATE 100 MG/1
100 TABLET, EXTENDED RELEASE ORAL DAILY
Qty: 90 TAB | Refills: 0 | Status: SHIPPED | OUTPATIENT
Start: 2019-07-15 | End: 2019-10-30 | Stop reason: SDUPTHER

## 2019-09-12 ENCOUNTER — OFFICE VISIT (OUTPATIENT)
Dept: INTERNAL MEDICINE CLINIC | Age: 64
End: 2019-09-12

## 2019-09-12 VITALS
OXYGEN SATURATION: 92 % | WEIGHT: 293 LBS | HEART RATE: 82 BPM | SYSTOLIC BLOOD PRESSURE: 125 MMHG | HEIGHT: 71 IN | BODY MASS INDEX: 41.02 KG/M2 | RESPIRATION RATE: 18 BRPM | TEMPERATURE: 98 F | DIASTOLIC BLOOD PRESSURE: 80 MMHG

## 2019-09-12 DIAGNOSIS — E78.5 DYSLIPIDEMIA: ICD-10-CM

## 2019-09-12 DIAGNOSIS — Z11.59 ENCOUNTER FOR HEPATITIS C SCREENING TEST FOR LOW RISK PATIENT: ICD-10-CM

## 2019-09-12 DIAGNOSIS — I10 BENIGN HYPERTENSION WITHOUT CHF: ICD-10-CM

## 2019-09-12 DIAGNOSIS — J44.9 CHRONIC OBSTRUCTIVE PULMONARY DISEASE, UNSPECIFIED COPD TYPE (HCC): Primary | ICD-10-CM

## 2019-09-12 DIAGNOSIS — I48.91 ATRIAL FIBRILLATION, UNSPECIFIED TYPE (HCC): ICD-10-CM

## 2019-09-12 DIAGNOSIS — E11.65 UNCONTROLLED TYPE 2 DIABETES MELLITUS WITH HYPERGLYCEMIA (HCC): ICD-10-CM

## 2019-09-12 RX ORDER — FLUTICASONE PROPIONATE AND SALMETEROL 500; 50 UG/1; UG/1
1 POWDER RESPIRATORY (INHALATION) EVERY 12 HOURS
COMMUNITY
Start: 2019-09-12 | End: 2019-11-13 | Stop reason: CLARIF

## 2019-09-12 NOTE — PROGRESS NOTES
Chief Complaint   Patient presents with    COPD    Hoarse     x 2 months       HPI:     Angelica Fernandes is a 75 y.o.  male with history of type 2 DM, COPD and afib here for the above complaint. Type 2 DM: sugars are normally around 120. Sugar range: 109-200's. This AM was 109 before eating [employer] pancakes. Some lows, but not a lot. Gout: no gout flares. COPD: he has shortness of breath and hoarsness. All the time shortness of breath,  He has hoarsness for 2-3 months. He denies any headaches, dizziness, abdominal pain. Past Medical History:   Diagnosis Date    [facility]     COPD (chronic obstructive pulmonary disease) (HCC)     Diabetes ([facility]     Elevated PSA     Erectile disorder due to medical condition in male    [place] Gout     High cholesterol     Hx-TIA (transient ischemic attack)     Hypertension     Pacemaker     Prostate cancer ([facility] 02/08/2018    Clinical staging T1c [place] score 7 and 6. pre-bx PSA 4.65 ng/mL. Past Surgical History:   Procedure Laterality Date    HX CIRCUMCISION  2014    HX COLONOSCOPY      HX OTHER SURGICAL Right 2011    fractured ankle. Has 1 screw    HX PACEMAKER PLACEMENT  2012    HX UROLOGICAL  02/08/2018    PNBx. TRUS Vol 54 grams. [place] 3+4 in RLM. [place] 3+3 in 5/12 cores. Dr. Deepak Ulloa. Current Outpatient Medications   Medication Sig    fluticasone propion-salmeterol (ADVAIR/WIXELA) 500-50 mcg/dose diskus inhaler Take 1 Puff by inhalation every twelve (12) hours.  tiotropium (SPIRIVA) 18 mcg inhalation capsule Take 1 Cap by inhalation daily.  LANTUS SOLOSTAR U-100 INSULIN 100 unit/mL (3 mL) inpn 60 Units by SubCUTAneous route Before breakfast and dinner. Indications: type 2 diabetes mellitus    SITagliptin-metFORMIN (JANUMET)  mg per tablet Take 1 Tab by mouth two (2) times daily (with meals).  metoprolol succinate (TOPROL-XL) 100 mg tablet Take 1 Tab by mouth daily.     losartan (COZAAR) 100 mg tablet Take 1 Tab by mouth daily.  tamsulosin (FLOMAX) 0.4 mg capsule Take 1 Cap by mouth daily.  bumetanide (BUMEX) 1 mg tablet Take 1 Tab by mouth two (2) times a day.  ipratropium-albuterol (COMBIVENT RESPIMAT)  mcg/actuation inhaler Take 1 Puff by inhalation every four (4) hours as needed for Wheezing. Indications: Bronchi Muscle Spasm resulting from COPD    spironolactone (ALDACTONE) 25 mg tablet Take 1 Tab by mouth two (2) times a day.  empagliflozin (JARDIANCE) 25 mg tablet Take 25 mg by mouth daily.  allopurinol (ZYLOPRIM) 100 mg tablet Take 100 mg by mouth daily.  XARELTO 20 mg tab tablet Take 20 mg by mouth daily (with breakfast).  simvastatin (ZOCOR) 10 mg tablet Take 10 mg by mouth nightly.  CIALIS 5 mg tablet     OTHER,NON-FORMULARY, 1 mL by IntraCAVernosal route as needed.  APIDRA SOLOSTAR 100 unit/mL pen Indications: sliding scale     No current facility-administered medications for this visit. Health Maintenance   Topic Date Due    Hepatitis C Screening  1955    HEMOGLOBIN A1C Q6M  1955    LIPID PANEL Q1  1955    FOOT EXAM Q1  01/14/1963    MICROALBUMIN Q1  01/14/1963    EYE EXAM RETINAL OR DILATED  01/14/1963    DTaP/Tdap/Td series (1 - Tdap) 01/14/1974    Shingrix Vaccine Age 50> (1 of 2) 01/14/2003    FOBT Q 1 YEAR AGE 50-75  01/14/2003    GLAUCOMA SCREENING Q2Y  01/14/2018    Pneumococcal 65+ years (1 of 2 - PCV13) 01/14/2018    MEDICARE YEARLY EXAM  03/20/2018    Influenza Age 9 to Adult  08/01/2019     Immunization History   Administered Date(s) Administered    Influenza Vaccine 02/02/2014    Pneumococcal Polysaccharide (PPSV-23) 02/03/2014     No LMP for male patient. Allergies and Intolerances:   No Known Allergies    Family History:   Family History   Problem Relation Age of Onset    Stroke Mother     Heart Attack Father        Social History:   He  reports that he quit smoking about 5 years ago.  His smoking use included cigarettes. He has never used smokeless tobacco.  He  reports that he drinks about 1.0 standard drinks of alcohol per week. ·     Objective:   Physical exam:   Visit Vitals  /80 (BP 1 Location: Right arm, BP Patient Position: Sitting)   Pulse 82   Temp 98 °F (36.7 °C) (Oral)   Resp 18   Ht 5' 11\" (1.803 m)   Wt 293 lb (132.9 kg)   SpO2 92%   BMI 40.87 kg/m²        Generally: Pleasant male in no acute distress  Cardiac Exam: regular, rate, and rhythm. Normal S1 and S2. No murmurs, gallops, or rubs  Pulmonary exam: Clear to auscultation bilaterally  Abdominal exam: Positive bowel sounds in all four quadrants, soft, nondistended, nontender  Extremities: 2+ dorsalis pedis pulses bilaterally. No pedal edema    bilaterally    LABS/Radiological Tests:  Component      Latest Ref Rng & Units 4/16/2018 3/7/2018 3/7/2018           8:43 AM  5:12 PM  4:12 PM   WBC      4.6 - 13.2 K/uL      RBC      4.70 - 5.50 M/uL      HGB      13.0 - 16.0 g/dL      HCT      36.0 - 48.0 %      MCV      74.0 - 97.0 FL      MCH      24.0 - 34.0 PG      MCHC      31.0 - 37.0 g/dL      RDW      11.6 - 14.5 %      PLATELET      553 - 617 K/uL      MPV      9.2 - 11.8 FL      NEUTROPHILS      42 - 75 %      BAND NEUTROPHILS      0 - 5 %      LYMPHOCYTES      20 - 51 %      MONOCYTES      2 - 9 %      EOSINOPHILS      0 - 5 %      BASOPHILS      0 - 3 %      METAMYELOCYTES      0 %      MYELOCYTES      0 %      PROMYELOCYTES      0 %      BLASTS      0 %      ABS. NEUTROPHILS      1.8 - 8.0 K/UL      ABS. LYMPHOCYTES      0.8 - 3.5 K/UL      ABS. MONOCYTES      0 - 1.0 K/UL      ABS. EOSINOPHILS      0.0 - 0.4 K/UL      ABS.  BASOPHILS      0.0 - 0.1 K/UL      PLATELET COMMENTS            RBC COMMENTS            DF            DIFFERENTIAL            Color            Appearance            Specific gravity      1.005 - 1.030      pH (UA)      5.0 - 8.0        Protein      NEG mg/dL      Glucose      NEG mg/dL      Ketone NEG mg/dL      Bilirubin      NEG        Blood      NEG        Urobilinogen      0.2 - 1.0 EU/dL      Nitrites      NEG        Leukocyte Esterase      NEG        WBC      0 - 4 /hpf      RBC      0 - 5 /hpf      Epithelial cells      0 - 5 /lpf      Bacteria      NEG /hpf      Sodium      136 - 145 mmol/L      Potassium      3.5 - 5.5 mmol/L      Chloride      100 - 108 mmol/L      CO2      21 - 32 mmol/L      Anion gap      3.0 - 18 mmol/L      Glucose      74 - 99 mg/dL      BUN      7.0 - 18 MG/DL      Creatinine      0.6 - 1.3 MG/DL      BUN/Creatinine ratio      12 - 20        GFR est AA      >60 ml/min/1.73m2      GFR est non-AA      >60 ml/min/1.73m2      Calcium      8.5 - 10.1 MG/DL      Prostate Specific Ag      0.0 - 4.0 ng/mL      PSA, Free      N/A ng/mL      PSA, % Free      %      Special Requests:         NO SPECIAL REQUESTS   Culture result:         NO GROWTH 2 DAYS   Testosterone, total, by LC/MS      264.0 - 916.0 ng/dL  199.3 (L)    Free testosterone (Direct)      6.6 - 18.1 pg/mL  8.2    APRESULT            RESULT:       Normal       Component      Latest Ref Rng & Units 3/7/2018 3/7/2018           4:12 PM  4:12 PM   WBC      4.6 - 13.2 K/uL  6.9   RBC      4.70 - 5.50 M/uL  4.80   HGB      13.0 - 16.0 g/dL  15.0   HCT      36.0 - 48.0 %  45.2   MCV      74.0 - 97.0 FL  94.2   MCH      24.0 - 34.0 PG  31.3   MCHC      31.0 - 37.0 g/dL  33.2   RDW      11.6 - 14.5 %  14.0   PLATELET      134 - 657 K/uL  214   MPV      9.2 - 11.8 FL  10.4   NEUTROPHILS      42 - 75 %  60   BAND NEUTROPHILS      0 - 5 %  0   LYMPHOCYTES      20 - 51 %  28   MONOCYTES      2 - 9 %  11 (H)   EOSINOPHILS      0 - 5 %  1   BASOPHILS      0 - 3 %  0   METAMYELOCYTES      0 %  0   MYELOCYTES      0 %  0   PROMYELOCYTES      0 %  0   BLASTS      0 %  0   ABS. NEUTROPHILS      1.8 - 8.0 K/UL  4.1   ABS. LYMPHOCYTES      0.8 - 3.5 K/UL  1.9   ABS. MONOCYTES      0 - 1.0 K/UL  0.8   ABS.  EOSINOPHILS      0.0 - 0.4 K/UL  0.1 ABS. BASOPHILS      0.0 - 0.1 K/UL  0.0   PLATELET COMMENTS        ADEQUATE PLATELETS   RBC COMMENTS        NORMOCYTIC, NORMOCHROMIC   DF        MANUAL   DIFFERENTIAL        MANUAL DIFFERENTIAL ORDERED   Color           Appearance           Specific gravity      1.005 - 1.030     pH (UA)      5.0 - 8.0       Protein      NEG mg/dL     Glucose      NEG mg/dL     Ketone      NEG mg/dL     Bilirubin      NEG       Blood      NEG       Urobilinogen      0.2 - 1.0 EU/dL     Nitrites      NEG       Leukocyte Esterase      NEG       WBC      0 - 4 /hpf     RBC      0 - 5 /hpf     Epithelial cells      0 - 5 /lpf     Bacteria      NEG /hpf     Sodium      136 - 145 mmol/L     Potassium      3.5 - 5.5 mmol/L     Chloride      100 - 108 mmol/L     CO2      21 - 32 mmol/L     Anion gap      3.0 - 18 mmol/L     Glucose      74 - 99 mg/dL     BUN      7.0 - 18 MG/DL     Creatinine      0.6 - 1.3 MG/DL     BUN/Creatinine ratio      12 - 20       GFR est AA      >60 ml/min/1.73m2     GFR est non-AA      >60 ml/min/1.73m2     Calcium      8.5 - 10.1 MG/DL     Prostate Specific Ag      0.0 - 4.0 ng/mL 3.4    PSA, Free      N/A ng/mL 0.51    PSA, % Free      % 15.0    Special Requests:           Culture result:           Testosterone, total, by LC/MS      264.0 - 916.0 ng/dL     Free testosterone (Direct)      6.6 - 18.1 pg/mL     APRESULT           RESULT:             Component      Latest Ref Rng & Units 3/7/2018 3/7/2018 2/8/2018           4:12 PM  4:12 PM 10:23 AM   WBC      4.6 - 13.2 K/uL      RBC      4.70 - 5.50 M/uL      HGB      13.0 - 16.0 g/dL      HCT      36.0 - 48.0 %      MCV      74.0 - 97.0 FL      MCH      24.0 - 34.0 PG      MCHC      31.0 - 37.0 g/dL      RDW      11.6 - 14.5 %      PLATELET      099 - 018 K/uL      MPV      9.2 - 11.8 FL      NEUTROPHILS      42 - 75 %      BAND NEUTROPHILS      0 - 5 %      LYMPHOCYTES      20 - 51 %      MONOCYTES      2 - 9 %      EOSINOPHILS      0 - 5 %      BASOPHILS 0 - 3 %      METAMYELOCYTES      0 %      MYELOCYTES      0 %      PROMYELOCYTES      0 %      BLASTS      0 %      ABS. NEUTROPHILS      1.8 - 8.0 K/UL      ABS. LYMPHOCYTES      0.8 - 3.5 K/UL      ABS. MONOCYTES      0 - 1.0 K/UL      ABS. EOSINOPHILS      0.0 - 0.4 K/UL      ABS. BASOPHILS      0.0 - 0.1 K/UL      PLATELET COMMENTS            RBC COMMENTS            DF            DIFFERENTIAL            Color       YELLOW     Appearance       CLEAR     Specific gravity      1.005 - 1.030 >1.030 (H)     pH (UA)      5.0 - 8.0   5.0     Protein      NEG mg/dL NEGATIVE     Glucose      NEG mg/dL >1,000 (A)     Ketone      NEG mg/dL NEGATIVE     Bilirubin      NEG   NEGATIVE     Blood      NEG   TRACE (A)     Urobilinogen      0.2 - 1.0 EU/dL 0.2     Nitrites      NEG   NEGATIVE     Leukocyte Esterase      NEG   NEGATIVE     WBC      0 - 4 /hpf 0 to 2     RBC      0 - 5 /hpf 0     Epithelial cells      0 - 5 /lpf FEW     Bacteria      NEG /hpf NEGATIVE     Sodium      136 - 145 mmol/L  142    Potassium      3.5 - 5.5 mmol/L  4.4    Chloride      100 - 108 mmol/L  106    CO2      21 - 32 mmol/L  31    Anion gap      3.0 - 18 mmol/L  5    Glucose      74 - 99 mg/dL  284 (H)    BUN      7.0 - 18 MG/DL  21 (H)    Creatinine      0.6 - 1.3 MG/DL  1.47 (H)    BUN/Creatinine ratio      12 - 20    14    GFR est AA      >60 ml/min/1.73m2  58 (L)    GFR est non-AA      >60 ml/min/1.73m2  48 (L)    Calcium      8.5 - 10.1 MG/DL  8.7    Prostate Specific Ag      0.0 - 4.0 ng/mL      PSA, Free      N/A ng/mL      PSA, % Free      %      Special Requests:            Culture result:            Testosterone, total, by LC/MS      264.0 - 916.0 ng/dL      Free testosterone (Direct)      6.6 - 18.1 pg/mL      APRESULT         AP results   RESULT:              Previous labs      ASSESSMENT/PLAN:    1. Chronic obstructive pulmonary disease, unspecified COPD type ([facility] [address]): not well controlled. We will add spiriva in addition to the advair. He has the combivent prn.   -     tiotropium (SPIRIVA) 18 mcg inhalation capsule; Take 1 Cap by inhalation daily. 2. Benign hypertension without CHF: stable. Continue diet, exercise and toprol, aldactone and cozaar and bumex. -     CBC W/O DIFF; Future    3. Dyslipidemia: we will see what the labs show. Continue diet, exercise and zocor.  -     METABOLIC PANEL, COMPREHENSIVE; Future  -     LIPID PANEL; Future    4. Uncontrolled type 2 diabetes mellitus with hyperglycemia (HCC):we will see what the labs show. Continue diet, exercise and lantus, janumet, jardiance, aprida.   -     HEMOGLOBIN A1C WITH EAG; Future  -     MICROALBUMIN, UR, RAND W/ MICROALB/CREAT RATIO; Future  -     TSH 3RD GENERATION; Future  -     URINALYSIS W/ RFLX MICROSCOPIC; Future    5. Atrial fibrillation, unspecified type ([facility] [address]): stable. Continue the xarelto, toprol. Being followed by Dr. Douglas Santos. 6. Encounter for hepatitis C screening test for low risk patient  -     HEPATITIS C AB; Future      7. Requested Prescriptions     Signed Prescriptions Disp Refills    tiotropium (SPIRIVA) 18 mcg inhalation capsule 30 Cap 2     Sig: Take 1 Cap by inhalation daily. 8. Patient verbalized understanding and agreement with the plan. 9. Patient was given an after-visit summary. 10.   Follow-up and Dispositions    · Return in about 2 weeks (around 9/26/2019) for f/u COPD  or sooner if worsening symptoms.                      Paz Dye MD

## 2019-09-12 NOTE — PROGRESS NOTES
Rm: 2    Chief Complaint   Patient presents with    COPD    Hoarse     x 2 months     Depression Screening:  3 most recent [facility] Screens 9/12/2019 6/28/2019 6/28/2019 6/12/2019 5/7/2019 3/12/2019   Little interest or pleasure in doing things Not at all Not at all Not at all Not at all Not at all Not at all   Feeling down, depressed, irritable, or hopeless Not at all Not at all Not at all Not at all Not at all Not at all   Total Score PHQ 2 0 0 0 0 0 0       Learning Assessment:  Learning Assessment 3/12/2019   PRIMARY LEARNER Patient   HIGHEST LEVEL OF EDUCATION - PRIMARY LEARNER  GRADUATED HIGH SCHOOL OR GED   BARRIERS PRIMARY LEARNER NONE   CO-LEARNER CAREGIVER No   PRIMARY LANGUAGE ENGLISH   LEARNER PREFERENCE PRIMARY READING   ANSWERED BY patient   RELATIONSHIP SELF       Abuse Screening:  Abuse Screening Questionnaire 3/12/2019   Do you ever feel afraid of your partner? N   Are you in a relationship with someone who physically or mentally threatens you? N   Is it safe for you to go home? Y       Health Maintenance reviewed and discussed per provider: yes     Coordination of Care:    1. Have you been to the ER, urgent care clinic since your last visit? Hospitalized since your last visit? no    2. Have you seen or consulted any other health care providers outside of the [address] [place] [address] since your last visit? Include any pap smears or colon screening.  no

## 2019-09-19 ENCOUNTER — HOSPITAL ENCOUNTER (OUTPATIENT)
Dept: LAB | Age: 64
Discharge: HOME | End: 2019-09-19
Payer: MEDICARE

## 2019-09-19 LAB
ALBUMIN SERPL-MCNC: 3.5 G/DL (ref 3.4–5)
ALBUMIN/GLOB SERPL: 1 {RATIO} (ref 0.8–1.7)
ALP SERPL-CCNC: 82 U/L (ref 45–117)
ALT SERPL-CCNC: 32 U/L (ref 16–61)
ANION GAP SERPL CALC-SCNC: 7 MMOL/L (ref 3–18)
APPEARANCE UR: CLEAR
AST SERPL-CCNC: 18 U/L (ref 10–38)
BACTERIA URNS QL MICRO: NEGATIVE /HPF
BILIRUB SERPL-MCNC: 0.4 MG/DL (ref 0.2–1)
BILIRUB UR QL: NEGATIVE
BUN SERPL-MCNC: 15 MG/DL (ref 7–18)
BUN/CREAT SERPL: 14 (ref 12–20)
CALCIUM SERPL-MCNC: 8.5 MG/DL (ref 8.5–10.1)
CHLORIDE SERPL-SCNC: 105 MMOL/L (ref 100–111)
CO2 SERPL-SCNC: 28 MMOL/L (ref 21–32)
COLOR UR: YELLOW
CREAT SERPL-MCNC: 1.06 MG/DL (ref 0.6–1.3)
EPITH CASTS URNS QL MICRO: NORMAL /LPF (ref 0–5)
ERYTHROCYTE [DISTWIDTH] IN BLOOD BY AUTOMATED COUNT: 14.9 % (ref 11.6–14.5)
EST. AVERAGE GLUCOSE BLD GHB EST-MCNC: 163 MG/DL
GLOBULIN SER CALC-MCNC: 3.6 G/DL (ref 2–4)
GLUCOSE SERPL-MCNC: 122 MG/DL (ref 74–99)
GLUCOSE UR STRIP.AUTO-MCNC: >1000 MG/DL
HBA1C MFR BLD: 7.3 % (ref 4.2–5.6)
HCT VFR BLD AUTO: 45.1 % (ref 36–48)
HGB BLD-MCNC: 14.4 G/DL (ref 13–16)
HGB UR QL STRIP: NEGATIVE
KETONES UR QL STRIP.AUTO: NEGATIVE MG/DL
LEUKOCYTE ESTERASE UR QL STRIP.AUTO: NEGATIVE
MCH RBC QN AUTO: 30.7 PG (ref 24–34)
MCHC RBC AUTO-ENTMCNC: 31.9 G/DL (ref 31–37)
MCV RBC AUTO: 96.2 FL (ref 74–97)
NITRITE UR QL STRIP.AUTO: NEGATIVE
PH UR STRIP: 7 [PH] (ref 5–8)
PLATELET # BLD AUTO: 197 K/UL (ref 135–420)
PMV BLD AUTO: 10.3 FL (ref 9.2–11.8)
POTASSIUM SERPL-SCNC: 4.1 MMOL/L (ref 3.5–5.5)
PROT SERPL-MCNC: 7.1 G/DL (ref 6.4–8.2)
PROT UR STRIP-MCNC: ABNORMAL MG/DL
RBC # BLD AUTO: 4.69 M/UL (ref 4.7–5.5)
RBC #/AREA URNS HPF: 0 /HPF (ref 0–5)
SODIUM SERPL-SCNC: 140 MMOL/L (ref 136–145)
SP GR UR REFRACTOMETRY: 1.03 (ref 1–1.03)
TSH SERPL DL<=0.05 MIU/L-ACNC: 1.01 UIU/ML (ref 0.36–3.74)
UROBILINOGEN UR QL STRIP.AUTO: 1 EU/DL (ref 0.2–1)
WBC # BLD AUTO: 9.8 K/UL (ref 4.6–13.2)
WBC URNS QL MICRO: NORMAL /HPF (ref 0–4)

## 2019-09-19 PROCEDURE — 80053 COMPREHEN METABOLIC PANEL: CPT

## 2019-09-19 PROCEDURE — 85027 COMPLETE CBC AUTOMATED: CPT

## 2019-09-19 PROCEDURE — 86803 HEPATITIS C AB TEST: CPT

## 2019-09-19 PROCEDURE — 84443 ASSAY THYROID STIM HORMONE: CPT

## 2019-09-19 PROCEDURE — 81001 URINALYSIS AUTO W/SCOPE: CPT

## 2019-09-19 PROCEDURE — 80061 LIPID PANEL: CPT

## 2019-09-19 PROCEDURE — 82043 UR ALBUMIN QUANTITATIVE: CPT

## 2019-09-19 PROCEDURE — 83036 HEMOGLOBIN GLYCOSYLATED A1C: CPT

## 2019-09-19 PROCEDURE — 36415 COLL VENOUS BLD VENIPUNCTURE: CPT

## 2019-09-20 LAB
CHOLEST SERPL-MCNC: 140 MG/DL
CREAT UR-MCNC: 189 MG/DL (ref 30–125)
HCV AB SER IA-ACNC: 0.1 INDEX
HCV AB SERPL QL IA: NEGATIVE
HCV COMMENT,HCGAC: NORMAL
HDLC SERPL-MCNC: 61 MG/DL (ref 40–60)
HDLC SERPL: 2.3 {RATIO} (ref 0–5)
LDLC SERPL CALC-MCNC: 56.4 MG/DL (ref 0–100)
LIPID PROFILE,FLP: ABNORMAL
MICROALBUMIN UR-MCNC: 3.06 MG/DL (ref 0–3)
MICROALBUMIN/CREAT UR-RTO: 16 MG/G (ref 0–30)
TRIGL SERPL-MCNC: 113 MG/DL (ref ?–150)
VLDLC SERPL CALC-MCNC: 22.6 MG/DL

## 2019-09-22 NOTE — PROGRESS NOTES
Please let pt know that labs were normal except:    1) urine creatinine and microalbumin up. We will monitor. 2) urine showed trace protein and glucose. 3) glucose up at 122 and HgA1c is up at 7.3. Increase lantus to 62 units BID. Work on diet and exercise.

## 2019-09-23 ENCOUNTER — TELEPHONE (OUTPATIENT)
Dept: INTERNAL MEDICINE CLINIC | Age: 64
End: 2019-09-23

## 2019-09-24 NOTE — TELEPHONE ENCOUNTER
Please call pt to set up a 30 minute appt. For [facility] f/u for vertigo/HA.  He was admitted from 9/21/19-9/23/19

## 2019-09-24 NOTE — PROGRESS NOTES
Call made to pt, both name and  verified. Pt was advised of results and recommendations. Pt verbalized understanding and had no further questions or concerns.

## 2019-09-26 ENCOUNTER — PATIENT OUTREACH (OUTPATIENT)
Dept: INTERNAL MEDICINE CLINIC | Age: 64
End: 2019-09-26

## 2019-09-26 NOTE — TELEPHONE ENCOUNTER
Requested Prescriptions     Pending Prescriptions Disp Refills    empagliflozin (JARDIANCE) 25 mg tablet       Sig: Take 1 Tab by mouth daily.

## 2019-09-26 NOTE — TELEPHONE ENCOUNTER
Cannot send rx electronically. Printed rx for:    Requested Prescriptions     Signed Prescriptions Disp Refills    empagliflozin (JARDIANCE) 25 mg tablet 90 Tab 3     Sig: Take 1 Tab by mouth daily. Authorizing Provider: Sukhwinder Butler     Please let pt know that this is ready for .

## 2019-09-26 NOTE — PROGRESS NOTES
Hospital Discharge Follow-Up      Date/Time:  2019 12:36 PM    Patient was admitted to [facility] on 19 and discharged on 19 for Headache, Vertigo. The physician discharge summary was available at the time of outreach. Patient was contacted within 2 business days of discharge. Top Challenges reviewed with the provider   None at this time         Nurse Navigator (NN) contacted the patient by telephone to perform post hospital discharge assessment. Verified name and  with patient as identifiers. Provided introduction to self, and explanation of the Nurse Navigator role. Patient states that he is doing fine. Patient denied headache. Patient states that his Vertigo is about the same. Patient states that Meclizine helps. Patient attended appt with Cardiology today 19. Unable to ask further questions at this time as Pt. Is currently driving. Conversation kept short as Pt. Is driving. Noted Per Cardiology Dr. Nicol Andres; Taken from [facility] MD office Epic. \"Assessment & Plan:   1) Chronic Systolic Heart Failure (EF <25%)   NICM   - Euvolemic on exam  - Echo 19: EF ~20% (decreased from 40% 2017, 30% in 2016), global hypokinesis, no hemodynamically significant valvular pathology and no intracardiac shunt identified. The technical quality of the Echo images were poor. - [facility] 2016: 10-20% LM with no other CAD noted   - Continue Bumex 1 mg BID, Spironolactone 25 mg BID   - Continue Losartan 100 mg daily, Metoprolol Succinate 100 mg daily   - Advised the patient to limit his sodium intake to <2000 mg daily and his fluid intake to <1.5-2 L daily   - Encouraged the patient to weigh himself daily and contact our office if his weight increases >3 lbs in 24 hours or >5 lbs in one week   - Recommend repeat TTE w/Doppler in 1 month and consideration of Bi-V upgrade. The Stroke Protocol Echo recently done during admission has poor quality images.    2) Paroxysmal Atrial Fibrillation s/p AVN Ablation   PPM Placement   - Recent device interrogation showed 2 brief episodes of pAF   - Continue Xarelto, Metoprolol   - Needs referral to sleep medicine   3) Vertigo   Possible TIA  - Scheduled to follow up with Neurology today \". Reviewed discharge instructions and red flags with patient who verbalized understanding. Patient given an opportunity to ask questions and does not have any further questions or concerns at this time. The patient agrees to contact the PCP office for questions related to their healthcare. NN provided contact information for future reference.     [facility] follow up appointment(s):   Future Appointments   Date Time Provider Mayuri Duncan   10/8/2019  2:20 PM Elise Saint, NP [place]   10/30/2019 12:30 PM Latoya Ibarra NP [address] [facility] [address]

## 2019-10-17 ENCOUNTER — DOCUMENTATION ONLY (OUTPATIENT)
Dept: INTERNAL MEDICINE CLINIC | Age: 64
End: 2019-10-17

## 2019-10-17 NOTE — PROGRESS NOTES
Faxed ov notes 9/26/19 from [phone] on 9/27/19 @ 1101. Appt: 10/30/19 with JUAQUIN Hussein.   Sent to scanning

## 2019-10-17 NOTE — PROGRESS NOTES
Faxed discharge summary 9/21/19-9/24/19 to [facility] at [phone] on 9/26/19 @ [phone].   Appt 10/30/19 with JUAQUIN Bear

## 2019-10-30 ENCOUNTER — OFFICE VISIT (OUTPATIENT)
Dept: FAMILY MEDICINE CLINIC | Age: 64
End: 2019-10-30

## 2019-10-30 VITALS
HEART RATE: 83 BPM | HEIGHT: 71 IN | TEMPERATURE: 96.3 F | RESPIRATION RATE: 12 BRPM | WEIGHT: 286 LBS | OXYGEN SATURATION: 97 % | BODY MASS INDEX: 40.04 KG/M2 | DIASTOLIC BLOOD PRESSURE: 70 MMHG | SYSTOLIC BLOOD PRESSURE: 121 MMHG

## 2019-10-30 DIAGNOSIS — E11.65 TYPE 2 DIABETES MELLITUS WITH HYPERGLYCEMIA, WITH LONG-TERM CURRENT USE OF INSULIN (HCC): ICD-10-CM

## 2019-10-30 DIAGNOSIS — E78.00 HIGH CHOLESTEROL: ICD-10-CM

## 2019-10-30 DIAGNOSIS — Z79.4 TYPE 2 DIABETES MELLITUS WITH HYPERGLYCEMIA, WITH LONG-TERM CURRENT USE OF INSULIN (HCC): ICD-10-CM

## 2019-10-30 DIAGNOSIS — I50.22 CHRONIC SYSTOLIC CHF (CONGESTIVE HEART FAILURE), NYHA CLASS 2 (HCC): Primary | ICD-10-CM

## 2019-10-30 DIAGNOSIS — R42 VERTIGO: ICD-10-CM

## 2019-10-30 DIAGNOSIS — C61 PROSTATE CANCER (HCC): ICD-10-CM

## 2019-10-30 DIAGNOSIS — J43.8 OTHER EMPHYSEMA (HCC): ICD-10-CM

## 2019-10-30 DIAGNOSIS — I10 ESSENTIAL HYPERTENSION: ICD-10-CM

## 2019-10-30 DIAGNOSIS — I50.9 CONGESTIVE HEART FAILURE, UNSPECIFIED HF CHRONICITY, UNSPECIFIED HEART FAILURE TYPE (HCC): ICD-10-CM

## 2019-10-30 DIAGNOSIS — I48.0 PAROXYSMAL ATRIAL FIBRILLATION (HCC): ICD-10-CM

## 2019-10-30 PROBLEM — J43.9 PULMONARY EMPHYSEMA (HCC): Status: ACTIVE | Noted: 2019-02-09

## 2019-10-30 PROBLEM — Z91.199 NONCOMPLIANCE WITH TREATMENT PLAN: Status: ACTIVE | Noted: 2019-02-09

## 2019-10-30 PROBLEM — H81.4 VERTIGO OF CENTRAL ORIGIN: Status: ACTIVE | Noted: 2019-10-30

## 2019-10-30 RX ORDER — LOSARTAN POTASSIUM 100 MG/1
100 TABLET ORAL DAILY
Qty: 90 TAB | Refills: 0 | Status: SHIPPED | OUTPATIENT
Start: 2019-10-30 | End: 2020-04-09 | Stop reason: SDUPTHER

## 2019-10-30 RX ORDER — SIMVASTATIN 20 MG/1
20 TABLET, FILM COATED ORAL
Qty: 90 TAB | Refills: 1 | Status: SHIPPED | OUTPATIENT
Start: 2019-10-30 | End: 2020-05-28 | Stop reason: SDUPTHER

## 2019-10-30 RX ORDER — INSULIN GLARGINE 100 [IU]/ML
62 INJECTION, SOLUTION SUBCUTANEOUS
Qty: 15 ADJUSTABLE DOSE PRE-FILLED PEN SYRINGE | Refills: 2 | Status: SHIPPED | OUTPATIENT
Start: 2019-10-30

## 2019-10-30 RX ORDER — METOPROLOL SUCCINATE 100 MG/1
100 TABLET, EXTENDED RELEASE ORAL DAILY
Qty: 90 TAB | Refills: 0 | Status: SHIPPED | OUTPATIENT
Start: 2019-10-30 | End: 2020-05-28 | Stop reason: SDUPTHER

## 2019-10-30 RX ORDER — GUAIFENESIN 100 MG/5ML
81 LIQUID (ML) ORAL DAILY
Qty: 90 TAB | Refills: 3 | Status: SHIPPED | OUTPATIENT
Start: 2019-10-30 | End: 2021-02-01 | Stop reason: SDUPTHER

## 2019-10-30 RX ORDER — FLUTICASONE FUROATE AND VILANTEROL 100; 25 UG/1; UG/1
1 POWDER RESPIRATORY (INHALATION) DAILY
Qty: 1 INHALER | Refills: 2 | Status: SHIPPED | OUTPATIENT
Start: 2019-10-30 | End: 2019-11-13

## 2019-10-30 RX ORDER — MECLIZINE HCL 12.5 MG 12.5 MG/1
12.5 TABLET ORAL
Qty: 30 TAB | Refills: 1 | Status: SHIPPED | OUTPATIENT
Start: 2019-10-30 | End: 2020-01-30 | Stop reason: SDUPTHER

## 2019-10-30 NOTE — PROGRESS NOTES
1. Have you been to the ER, urgent care clinic since your last visit? Hospitalized since your last visit? Yes When: 9-21-19 Asha Morales for headache    2. Have you seen or consulted any other health care providers outside of the [address] [place] [address] since your last visit? Include any pap smears or colon screening.  No     Patient scheduled for sleep medicine appointment on 11-14-19

## 2019-11-13 DIAGNOSIS — J43.8 OTHER EMPHYSEMA (HCC): Primary | ICD-10-CM

## 2019-12-31 NOTE — PROGRESS NOTES
[address] [place] [address]               [phone]      Roosevelt Blanton is a 75 y.o. male and presents with [facility] (new to you former patient of Dr. Consuelo Atkinson)       Assessment/Plan:    Diagnoses and all orders for this visit:    1. Chronic systolic CHF (congestive heart failure), NYHA class 2 ([facility])  Chronic heart failure, followed by cardiology  Most recent echocardiogram showed an EF of 20%  He does have a pacer/AICD    2. Congestive heart failure, unspecified HF chronicity, unspecified heart failure type ([facility])  -     aspirin 81 mg chewable tablet; Take 1 Tab by mouth daily. Recently started on aspirin, prescription refill ordered    3. Type 2 diabetes mellitus with hyperglycemia, with long-term current use of insulin ([facility])  -     LANTUS SOLOSTAR U-100 INSULIN 100 unit/mL (3 mL) inpn; 62 Units by SubCUTAneous route Before breakfast and dinner. Indications: type 2 diabetes mellitus  -     REFERRAL TO OPHTHALMOLOGY  He has been managing his diabetes using Lantus 62 units 2 times a day  Health maintenance need of a diabetic eye exam addressed today: Referral placed    4. Essential hypertension  -     losartan (COZAAR) 100 mg tablet; Take 1 Tab by mouth daily. -     metoprolol succinate (TOPROL-XL) 100 mg tablet; Take 1 Tab by mouth daily.  -     REFERRAL TO OPHTHALMOLOGY  Blood pressure stable, 121/70 in the office today  Continue same medications    5. High cholesterol  -     simvastatin (ZOCOR) 20 mg tablet; Take 1 Tab by mouth nightly. Medication refill ordered    6. Prostate cancer [facility])  Managed by urologist, Dr. Arletha Frankel located in [place]    7. Other emphysema ([facility])  -     fluticasone furoate-vilanterol (BREO ELLIPTA) 100-25 mcg/dose inhaler; Take 1 Puff by inhalation daily. Managed by a doctor located in [place], at this time he is only seen him once and does not remember his name  Medication refill ordered    8.  Paroxysmal atrial fibrillation Group Topic:  Relapse Prevention Education     Date: 12/31/2019  Start Time:  2:45 PM  End Time:  3:30 PM  Facilitators: Hannah E von Behren, APSW    Focus: Patient's will identify insights and skills learned in treatment today and how they will incorporate these while at home and individuation towards personal treatment plan and preparation towards recovery lifestyle and prevention of relapse behaviors. : “What did I learn new today?”, “What danger's to my recover might I encounter until next programming that I can disc with peers and therapist for help?”, “How can Pt. apply today's programming to adjust and improve recovery insights and skills to persevere towards recovery goals.”  Number in attendance: 6 + 2 Res    Pt reports he liked the list of \"99 coping skills\" and plans to read through these more tonight. Pt plans to also spend time with his son.    Method: Group  Attendance: Present  Participation: Active  Patient Response: Able to return demonstration, Appropriate feedback, Asked questions and Attentive  Mood: Stable  Affect: Calm and Positive  Behavior/Socialization: Appropriate to group and Cooperative  Thought Process: Demonstrated insight  Task Performance: Follows directions     Hannah Von Behren, RUY, LCSW, CSAC     ([address]  Controlled with his beta-blocker and on Xarelto for anticoagulation  Of note in 2014 he had ablation of an accessory bypass tract in his AV node at [facility]    9. Vertigo  -     meclizine (ANTIVERT) 12.5 mg tablet; Take 1 Tab by mouth three (3) times daily as needed for Dizziness. Had a ED visit on 9/21/2019 for episode of vertigo accompanied by nystagmus and a headache  While he was in the ED the episode resolved  He was given meclizine to take home, at this time when he gets a headache he takes the meclizine and states he gets headache relief, he has an upcoming appointment with a neurologist    This note was dictated using Dragon dictation system. Every effort was made to ensure accuracy, although occasional spelling errors and word substitutions are expected due to dictation system limitations. Thank you for your understanding and patient. Greater than 50% of the 40 minutes was spent in counseling and coordination of care. Follow-up and Dispositions    · Return in about 3 months (around 1/30/2020) for Medicare annual wellness exam, Diabetes, DM foot, hypertension, 45 min. Subjective:        ROS:     Review of Systems   Constitutional: Positive for malaise/fatigue. Overall feels fair, better than last week   HENT: Negative. Eyes: Positive for blurred vision. Respiratory: Positive for shortness of breath. SOB: with activity- moderate, has to stop and sit down   Cardiovascular: Positive for leg swelling. History of CHF, last EF 25%, has pacer/AICD  PMH SSS    Gastrointestinal: Positive for constipation. Constipation: takes stool softner and they do not help, takes laxative 1-2 times a weej   Genitourinary:        Has ED-cannot obtain erection, plans to see MD about penis implant   Musculoskeletal: Negative. Skin: Negative. Neurological: Positive for headaches.         Headaches: onset: 1 month ago, Location: in back of head, Duration: intermittent, they last 10-15 minutes, Characteristics: pressure, no n/v, sweating, vision changes  Treatment: meclizine-got at an ED visit  Meclizine cahterine it better, nothing makes it worse     Psychiatric/Behavioral: Positive for memory loss. Negative for depression, hallucinations, substance abuse and suicidal ideas. The patient is not nervous/anxious and does not have insomnia. Memory not as sharp as it used to be for about a year       The problem list was updated as a part of today's visit. Patient Active Problem List   Diagnosis Code    Essential hypertension I10    Hx-TIA (transient ischemic attack) Z86.73    High cholesterol E78.00    Gout M10.9    Erectile disorder due to medical condition in male N52.1    Type 2 diabetes mellitus with hyperglycemia, with long-term current use of insulin ([facility]) E11.65, Z79.4    Prostate cancer ([facility] C61    Pacemaker Z95.0    BMI 40.0-44.9, adult ([facility] Z68.41    Congestive heart failure (HCC) I50.9    Pulmonary emphysema ([facility]) J43.9    Cardiomyopathy ([facility]) I42.9    Chronic systolic CHF (congestive heart failure), NYHA class 2 ([facility]) I50.22    Noncompliance with treatment plan Z91.11    Paroxysmal atrial fibrillation (HCC) I48.0    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 SH:  Social History     Tobacco Use    Smoking status: Former Smoker     Types: Cigarettes     Last attempt to quit: 2014     Years since quittin.8    Smokeless tobacco: Never Used   Substance Use Topics    Alcohol use: Yes     Alcohol/week: 1.0 standard drinks     Types: 1 Shots of liquor per week     Comment: occasionally    Drug use: No       Medications/Allergies:  Current Outpatient Medications on File Prior to Visit   Medication Sig Dispense Refill    empagliflozin (JARDIANCE) 25 mg tablet Take 1 Tab by mouth daily.  90 Tab 3    fluticasone propion-salmeterol (ADVAIR/WIXELA) 500-50 mcg/dose diskus inhaler Take 1 Puff by inhalation every twelve (12) hours.  tiotropium (SPIRIVA) 18 mcg inhalation capsule Take 1 Cap by inhalation daily. 30 Cap 2    SITagliptin-metFORMIN (JANUMET)  mg per tablet Take 1 Tab by mouth two (2) times daily (with meals). 180 Tab 1    tamsulosin (FLOMAX) 0.4 mg capsule Take 1 Cap by mouth daily. 90 Cap 3    bumetanide (BUMEX) 1 mg tablet Take 1 Tab by mouth two (2) times a day. 180 Tab 3    spironolactone (ALDACTONE) 25 mg tablet Take 1 Tab by mouth two (2) times a day. 180 Tab 3    allopurinol (ZYLOPRIM) 100 mg tablet Take 100 mg by mouth daily.  APIDRA SOLOSTAR 100 unit/mL pen Indications: sliding scale      XARELTO 20 mg tab tablet Take 20 mg by mouth daily (with breakfast).  [DISCONTINUED] meclizine (ANTIVERT) 12.5 mg tablet Take 12.5 mg by mouth.  [DISCONTINUED] fluticasone furoate-vilanterol (BREO ELLIPTA) 100-25 mcg/dose inhaler Take 1 Puff by inhalation.  [DISCONTINUED] aspirin 81 mg chewable tablet Take 81 mg by mouth.  [DISCONTINUED] LANTUS SOLOSTAR U-100 INSULIN 100 unit/mL (3 mL) inpn 60 Units by SubCUTAneous route Before breakfast and dinner. Indications: type 2 diabetes mellitus (Patient taking differently: 62 Units by SubCUTAneous route Before breakfast and dinner. Indications: type 2 diabetes mellitus) 15 Adjustable Dose Pre-filled Pen Syringe 3    [DISCONTINUED] metoprolol succinate (TOPROL-XL) 100 mg tablet Take 1 Tab by mouth daily. 90 Tab 0    [DISCONTINUED] losartan (COZAAR) 100 mg tablet Take 1 Tab by mouth daily. 90 Tab 0    ipratropium-albuterol (COMBIVENT RESPIMAT)  mcg/actuation inhaler Take 1 Puff by inhalation every four (4) hours as needed for Wheezing. Indications: Bronchi Muscle Spasm resulting from COPD 3 Inhaler 3    OTHER,NON-FORMULARY, 1 mL by IntraCAVernosal route as needed. 5 Each 5    [DISCONTINUED] simvastatin (ZOCOR) 10 mg tablet Take 20 mg by mouth nightly.        No current facility-administered medications on file prior to visit. No Known Allergies    Objective:  Visit Vitals  /70   Pulse 83   Temp 96.3 °F (35.7 °C) (Oral)   Resp 12   Ht 5' 11\" (1.803 m)   Wt 286 lb (129.7 kg)   SpO2 97%   BMI 39.89 kg/m²    Body mass index is 39.89 kg/m². Physical assessment  Physical Exam   Constitutional: He is oriented to person, place, and time and well-developed, well-nourished, and in no distress. Vital signs are normal.   HENT:   Head: Normocephalic and atraumatic. Right Ear: Hearing, tympanic membrane, external ear and ear canal normal.   Left Ear: Hearing, tympanic membrane, external ear and ear canal normal.   Nose: Nose normal.   Mouth/Throat: Uvula is midline, oropharynx is clear and moist and mucous membranes are normal.   Eyes: Pupils are equal, round, and reactive to light. Conjunctivae, EOM and lids are normal.   Neck: Trachea normal and normal range of motion. Neck supple. No JVD present. Carotid bruit is not present. No tracheal deviation present. No thyroid mass and no thyromegaly present. Cardiovascular: Normal rate, regular rhythm, S1 normal, S2 normal, normal heart sounds and intact distal pulses. Pulmonary/Chest: Breath sounds normal. Accessory muscle usage present. Slightly short of breath with normal activity   Abdominal: Soft. Normal appearance and bowel sounds are normal. There is no tenderness. There is no CVA tenderness. Musculoskeletal: Normal range of motion. Right lower leg: He exhibits edema. Left lower leg: He exhibits edema. Lymphadenopathy:        Head (right side): No submental, no submandibular, no tonsillar, no preauricular, no posterior auricular and no occipital adenopathy present. Head (left side): No submental, no submandibular, no tonsillar, no preauricular, no posterior auricular and no occipital adenopathy present. He has no cervical adenopathy.    Neurological: He is alert and oriented to person, place, and time. He has normal motor skills. Gait normal.   Skin: Skin is warm and dry. Psychiatric: Mood, memory, affect and judgment normal.   Nursing note and vitals reviewed. Labwork and Ancillary Studies:    CBC w/Diff  Lab Results   Component Value Date/Time    WBC 9.8 09/19/2019 09:36 AM    HGB 14.4 09/19/2019 09:36 AM    PLATELET 477 44/98/5614 09:36 AM         Basic Metabolic Profile  Lab Results   Component Value Date/Time    Sodium 140 09/19/2019 09:36 AM    Potassium 4.1 09/19/2019 09:36 AM    Chloride 105 09/19/2019 09:36 AM    CO2 28 09/19/2019 09:36 AM    Anion gap 7 09/19/2019 09:36 AM    Glucose 122 (H) 09/19/2019 09:36 AM    BUN 15 09/19/2019 09:36 AM    Creatinine 1.06 09/19/2019 09:36 AM    BUN/Creatinine ratio 14 09/19/2019 09:36 AM    GFR est AA >60 09/19/2019 09:36 AM    GFR est non-AA >60 09/19/2019 09:36 AM    Calcium 8.5 09/19/2019 09:36 AM        Cholesterol  Lab Results   Component Value Date/Time    Cholesterol, total 140 09/19/2019 09:36 AM    HDL Cholesterol 61 (H) 09/19/2019 09:36 AM    LDL, calculated 56.4 09/19/2019 09:36 AM    Triglyceride 113 09/19/2019 09:36 AM    CHOL/HDL Ratio 2.3 09/19/2019 09:36 AM       Health Maintenance:   Health Maintenance   Topic Date Due    FOOT EXAM Q1  01/14/1965    EYE EXAM RETINAL OR DILATED  01/14/1963    FOBT Q 1 YEAR AGE 50-75  01/14/2003    GLAUCOMA SCREENING Q2Y  01/14/2018    MEDICARE YEARLY EXAM  03/20/2018    Shingrix Vaccine Age 50> (1 of 2) 02/04/2020 (Originally 1/14/2003)    Influenza Age 5 to Adult  06/30/2020 (Originally 8/1/2019)    DTaP/Tdap/Td series (1 - Tdap) 10/30/2020 (Originally 1/14/1974)    Pneumococcal 65+ years (1 of 2 - PCV13) 10/30/2020 (Originally 1/14/2018)    HEMOGLOBIN A1C Q6M  03/22/2020    MICROALBUMIN Q1  09/19/2020    LIPID PANEL Q1  09/22/2020    Hepatitis C Screening  Completed       I have discussed the diagnosis with the patient and the intended plan as seen in the above orders. The patient has received an After-Visit Summary and questions were answered concerning future plans. An After Visit Summary was printed and given to the patient. All diagnosis have been discussed with the patient and all of the patient's questions have been answered. Follow-up and Dispositions    · Return in about 3 months (around 1/30/2020) for Medicare annual wellness exam, Diabetes, DM foot, hypertension, 45 min. Ynes Yip, [facility]  [address]  [id number]

## 2020-01-30 ENCOUNTER — OFFICE VISIT (OUTPATIENT)
Dept: FAMILY MEDICINE CLINIC | Age: 65
End: 2020-01-30

## 2020-01-30 VITALS
DIASTOLIC BLOOD PRESSURE: 90 MMHG | TEMPERATURE: 98 F | BODY MASS INDEX: 41.02 KG/M2 | RESPIRATION RATE: 16 BRPM | SYSTOLIC BLOOD PRESSURE: 143 MMHG | HEIGHT: 71 IN | OXYGEN SATURATION: 91 % | WEIGHT: 293 LBS | HEART RATE: 85 BPM

## 2020-01-30 DIAGNOSIS — Z79.4 TYPE 2 DIABETES MELLITUS WITH HYPERGLYCEMIA, WITH LONG-TERM CURRENT USE OF INSULIN (HCC): ICD-10-CM

## 2020-01-30 DIAGNOSIS — I10 ESSENTIAL HYPERTENSION: ICD-10-CM

## 2020-01-30 DIAGNOSIS — R42 VERTIGO: ICD-10-CM

## 2020-01-30 DIAGNOSIS — E78.00 HIGH CHOLESTEROL: ICD-10-CM

## 2020-01-30 DIAGNOSIS — E11.65 TYPE 2 DIABETES MELLITUS WITH HYPERGLYCEMIA, WITH LONG-TERM CURRENT USE OF INSULIN (HCC): ICD-10-CM

## 2020-01-30 DIAGNOSIS — Z13.31 SCREENING FOR DEPRESSION: ICD-10-CM

## 2020-01-30 DIAGNOSIS — Z13.39 SCREENING FOR ALCOHOLISM: ICD-10-CM

## 2020-01-30 DIAGNOSIS — Z00.00 MEDICARE ANNUAL WELLNESS VISIT, SUBSEQUENT: Primary | ICD-10-CM

## 2020-01-30 RX ORDER — MECLIZINE HCL 12.5 MG 12.5 MG/1
12.5 TABLET ORAL
Qty: 30 TAB | Refills: 1 | Status: SHIPPED | OUTPATIENT
Start: 2020-01-30 | End: 2020-10-29

## 2020-01-30 NOTE — PROGRESS NOTES
Chief Complaint   Patient presents with    Hypertension    Diabetes     DM Foot    Annual Wellness Visit       1. Have you been to the ER, urgent care clinic since your last visit? Hospitalized since your last visit? [facility] for pacemaker- December 2019     2. Have you seen or consulted any other health care providers outside of the [address] [place] [address] since your last visit? Include any pap smears or colon screening.  Cardiology- Dr. Radha Mosley

## 2020-01-30 NOTE — PATIENT INSTRUCTIONS
Medicare Wellness Visit, Male The best way to live healthy is to have a lifestyle where you eat a well-balanced diet, exercise regularly, limit alcohol use, and quit all forms of tobacco/nicotine, if applicable. Regular preventive services are another way to keep healthy. Preventive services (vaccines, screening tests, monitoring & exams) can help personalize your care plan, which helps you manage your own care. Screening tests can find health problems at the earliest stages, when they are easiest to treat. [facility] follows the current, evidence-based guidelines published by the [facility] Harry Martin ([facility]) when recommending preventive services for our patients. Because we follow these guidelines, sometimes recommendations change over time as research supports it. (For example, a prostate screening blood test is no longer routinely recommended for men with no symptoms). Of course, you and your doctor may decide to screen more often for some diseases, based on your risk and co-morbidities (chronic disease you are already diagnosed with). Preventive services for you include: - Medicare offers their members a free annual wellness visit, which is time for you and your primary care provider to discuss and plan for your preventive service needs. Take advantage of this benefit every year! 
-All adults over age 72 should receive the recommended pneumonia vaccines. Current USPSTF guidelines recommend a series of two vaccines for the best pneumonia protection.  
-All adults should have a flu vaccine yearly and tetanus vaccine every 10 years. 
-All adults age 48 and older should receive the shingles vaccines (series of two vaccines).       
-All adults age 38-68 who are overweight should have a diabetes screening test once every three years.  
-Other screening tests & preventive services for persons with diabetes include: an eye exam to screen for diabetic retinopathy, a kidney function test, a foot exam, and stricter control over your cholesterol.  
-Cardiovascular screening for adults with routine risk involves an electrocardiogram (ECG) at intervals determined by the provider.  
-Colorectal cancer screening should be done for adults age 54-65 with no increased risk factors for colorectal cancer. There are a number of acceptable methods of screening for this type of cancer. Each test has its own benefits and drawbacks. Discuss with your provider what is most appropriate for you during your annual wellness visit. The different tests include: colonoscopy (considered the best screening method), a fecal occult blood test, a fecal DNA test, and sigmoidoscopy. 
-All adults born between [facility] should be screened once for Hepatitis C. 
-An Abdominal Aortic Aneurysm (AAA) Screening is recommended for men age 73-68 who has ever smoked in their lifetime. Here is a list of your current Health Maintenance items (your personalized list of preventive services) with a due date: 
Health Maintenance Due Topic Date Due  
 Diabetic Foot Care  01/14/1963  Stool testing for trace blood  01/14/2003 [address] [place] [address] Annual Well Visit  03/20/2018

## 2020-01-30 NOTE — PROGRESS NOTES
[address] [place] [address], [facility] [address]               [phone]      Briana Russell is a 77 y.o. male and presents with Hypertension; Diabetes (DM Foot); and Annual Wellness Visit       Assessment/Plan:    Diagnoses and all orders for this visit:    1. Medicare annual wellness visit, subsequent  -     [address]  Medicare wellness exam completed today to include alcohol and depression screening    2. Screening for alcoholism  -     [place] ANNUAL ALCOHOL SCREEN 15 MIN    3. Screening for depression  -     DEPRESSION SCREEN ANNUAL    4. Vertigo  -     meclizine (ANTIVERT) 12.5 mg tablet; Take 1 Tab by mouth three (3) times daily as needed for Dizziness. Medication refill given    5. Type 2 diabetes mellitus with hyperglycemia, with long-term current use of insulin (HCC)  -     SITagliptin-metFORMIN (JANUMET)  mg per tablet; Take 1 Tab by mouth two (2) times daily (with meals). -     empagliflozin (JARDIANCE) 25 mg tablet; Take 1 Tab by mouth daily. Health maintenance need for diabetic foot exam completed today  Diabetes is managed by endocrinology  Medication refills given    6. Essential hypertension  Blood pressure controlled, continue same medications, he had lab work done in December through [address] [place] [address]. High cholesterol  Cholesterol levels have been stable, continue same medication    Unfortunately Mr. Uday Bates informed me he will be switching to a new PCP due to the fact our office is too far of a drive for him  I will gladly take care of him until he has established care with a new PCP      Follow-up and Dispositions    · Return for make appt with new PCP in 3 months to establish care. Subjective:    Hypertension:   Patient reports taking medications as instructed. yes   Medication side effects noted. no  Headache upon wakening. no   Home BP monitoring in range of does not check's systolic.       HLD:  Has been compliant with meds yes  Compliant with low-fat diet. yes    Denies myalgias or other side effects. no  Cholesterol, total   Date Value Ref Range Status   09/19/2019 140 <200 MG/DL Final     Triglyceride   Date Value Ref Range Status   09/19/2019 113 <150 MG/DL Final     Comment:     The drugs N-acetylcysteine (NAC) and  Metamiszole have been found to cause falsely  low results in this chemical assay. Please  be sure to submit blood samples obtained  BEFORE administration of either of these  drugs to assure correct results. HDL Cholesterol   Date Value Ref Range Status   09/19/2019 61 (H) 40 - 60 MG/DL Final     LDL, calculated   Date Value Ref Range Status   09/19/2019 56.4 0 - 100 MG/DL Final   ]  DMII-   Patient reports medication compliance Daily  Diabetic diet compliance most of the time  Patient monitors blood sugars regularly QID   Reports am fasting sugars range 151-200   Denies hypoglycemic episodes. no   Last eye exam approximately 2 months ago   Followed by endocrinology   Hemoglobin A1c   Date Value Ref Range Status   09/19/2019 7.3 (H) 4.2 - 5.6 % Final     Comment:     (NOTE)  HbA1C Interpretive Ranges  <5.7              Normal  5.7 - 6.4         Consider Prediabetes  >6.5              Consider Diabetes     ]  Creatinine, urine   Date Value Ref Range Status   09/19/2019 189.00 (H) 30 - 125 mg/dL Final     Microalbumin/Creat ratio (mg/g creat)   Date Value Ref Range Status   09/19/2019 16 0 - 30 mg/g Final     Key Antihyperglycemic Medications             SITagliptin-metFORMIN (JANUMET)  mg per tablet (Taking) Take 1 Tab by mouth two (2) times daily (with meals). empagliflozin (JARDIANCE) 25 mg tablet (Taking) Take 1 Tab by mouth daily. LANTUS SOLOSTAR U-100 INSULIN 100 unit/mL (3 mL) inpn (Taking) 62 Units by SubCUTAneous route Before breakfast and dinner.  Indications: type 2 diabetes mellitus    APIDRA SOLOSTAR 100 unit/mL pen (Taking) Indications: sliding scale                ROS:As stated in HPI, otherwise all others negative. ROS    The problem list was updated as a part of today's visit. Patient Active Problem List   Diagnosis Code    Essential hypertension I10    Hx-TIA (transient ischemic attack) Z86.73    High cholesterol E78.00    Gout M10.9    Erectile disorder due to medical condition in male N52.1    Type 2 diabetes mellitus with hyperglycemia, with long-term current use of insulin (HCC) E11.65, Z79.4    Prostate cancer ([facility] C61    Pacemaker Z95.0    BMI 40.0-44.9, adult ([facility] Z68.41    Congestive heart failure (HCC) I50.9    Pulmonary emphysema ([facility]) J43.9    Cardiomyopathy ([facility]) I42.9    Chronic systolic CHF (congestive heart failure), NYHA class 2 ([facility]) I50.22    Noncompliance with treatment plan Z91.11    Paroxysmal atrial fibrillation ([facility]) I48.0    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 SH:  Social History     Tobacco Use    Smoking status: Former Smoker     Types: Cigarettes     Last attempt to quit: 2014     Years since quittin.0    Smokeless tobacco: Never Used   Substance Use Topics    Alcohol use: Yes     Alcohol/week: 1.0 standard drinks     Types: 1 Shots of liquor per week     Comment: occasionally    Drug use: No       Medications/Allergies:  Current Outpatient Medications on File Prior to Visit   Medication Sig Dispense Refill    fluticasone propion-salmeterol (ADVAIR HFA) 115-21 mcg/actuation inhaler Take 2 Puffs by inhalation two (2) times a day. 1 Inhaler 5    aspirin 81 mg chewable tablet Take 1 Tab by mouth daily. 90 Tab 3    simvastatin (ZOCOR) 20 mg tablet Take 1 Tab by mouth nightly. 90 Tab 1    losartan (COZAAR) 100 mg tablet Take 1 Tab by mouth daily. 90 Tab 0    metoprolol succinate (TOPROL-XL) 100 mg tablet Take 1 Tab by mouth daily.  90 Tab 0    LANTUS SOLOSTAR U-100 INSULIN 100 unit/mL (3 mL) inpn 62 Units by SubCUTAneous route Before breakfast and dinner. Indications: type 2 diabetes mellitus 15 Adjustable Dose Pre-filled Pen Syringe 2    tiotropium (SPIRIVA) 18 mcg inhalation capsule Take 1 Cap by inhalation daily. 30 Cap 2    tamsulosin (FLOMAX) 0.4 mg capsule Take 1 Cap by mouth daily. 90 Cap 3    bumetanide (BUMEX) 1 mg tablet Take 1 Tab by mouth two (2) times a day. 180 Tab 3    spironolactone (ALDACTONE) 25 mg tablet Take 1 Tab by mouth two (2) times a day. 180 Tab 3    OTHER,NON-FORMULARY, 1 mL by IntraCAVernosal route as needed. 5 Each 5    allopurinol (ZYLOPRIM) 100 mg tablet Take 100 mg by mouth daily.  APIDRA SOLOSTAR 100 unit/mL pen Indications: sliding scale      XARELTO 20 mg tab tablet Take 20 mg by mouth daily (with breakfast).  [DISCONTINUED] meclizine (ANTIVERT) 12.5 mg tablet Take 1 Tab by mouth three (3) times daily as needed for Dizziness. 30 Tab 1    [DISCONTINUED] empagliflozin (JARDIANCE) 25 mg tablet Take 1 Tab by mouth daily. 90 Tab 3    [DISCONTINUED] SITagliptin-metFORMIN (JANUMET)  mg per tablet Take 1 Tab by mouth two (2) times daily (with meals). 180 Tab 1     No current facility-administered medications on file prior to visit. No Known Allergies    Objective:  Visit Vitals  /90   Pulse 85   Temp 98 °F (36.7 °C) (Oral)   Resp 16   Ht 5' 11\" (1.803 m)   Wt 293 lb (132.9 kg)   SpO2 91%   BMI 40.87 kg/m²    Body mass index is 40.87 kg/m². Physical assessment  Physical Exam  Vitals signs and nursing note reviewed. Eyes:      Conjunctiva/sclera: Conjunctivae normal.      Pupils: Pupils are equal, round, and reactive to light. Neck:      Musculoskeletal: Normal range of motion. Vascular: No JVD. Cardiovascular:      Rate and Rhythm: Normal rate and regular rhythm. Heart sounds: Normal heart sounds. No murmur. No friction rub. No gallop. Comments: defibrillator placed 12/2019  Pulmonary:      Effort: Pulmonary effort is normal.      Breath sounds: Normal breath sounds. Musculoskeletal: Normal range of motion. Feet:      Comments: Diabetic foot exam:     Left: Reflexes 1+     Vibratory sensation normal    Proprioception normal   Sharp/dull discrimination normal    Filament test normal sensation with micro filament   Pulse DP: 2+ (normal)   Deformities: None  Right: Reflexes 1+   Vibratory sensation normal   Proprioception normal   Sharp/dull discrimination normal   Filament test normal sensation with micro filament   Pulse DP: 2+ (normal)   Deformities: None  Skin:     General: Skin is warm and dry. Neurological:      Mental Status: He is alert and oriented to person, place, and time.    Psychiatric:         Mood and Affect: Affect normal.         Cognition and Memory: Memory normal.         Judgment: Judgment normal.           Labwork and Ancillary Studies:    CBC w/Diff  Lab Results   Component Value Date/Time    WBC 9.8 09/19/2019 09:36 AM    HGB 14.4 09/19/2019 09:36 AM    PLATELET 606 71/62/3529 09:36 AM         Basic Metabolic Profile  Lab Results   Component Value Date/Time    Sodium 140 09/19/2019 09:36 AM    Potassium 4.1 09/19/2019 09:36 AM    Chloride 105 09/19/2019 09:36 AM    CO2 28 09/19/2019 09:36 AM    Anion gap 7 09/19/2019 09:36 AM    Glucose 122 (H) 09/19/2019 09:36 AM    BUN 15 09/19/2019 09:36 AM    Creatinine 1.06 09/19/2019 09:36 AM    BUN/Creatinine ratio 14 09/19/2019 09:36 AM    GFR est AA >60 09/19/2019 09:36 AM    GFR est non-AA >60 09/19/2019 09:36 AM    Calcium 8.5 09/19/2019 09:36 AM        Cholesterol  Lab Results   Component Value Date/Time    Cholesterol, total 140 09/19/2019 09:36 AM    HDL Cholesterol 61 (H) 09/19/2019 09:36 AM    LDL, calculated 56.4 09/19/2019 09:36 AM    Triglyceride 113 09/19/2019 09:36 AM    CHOL/HDL Ratio 2.3 09/19/2019 09:36 AM       Health Maintenance:   Health Maintenance   Topic Date Due    FOOT EXAM Q1  01/14/1965    FOBT Q 1 YEAR AGE 50-75  01/14/2003    Shingrix Vaccine Age 50> (1 of 2) 02/04/2020 (Originally 1/14/2003)    DTaP/Tdap/Td series (1 - Tdap) 10/30/2020 (Originally 1/14/1964)    Pneumococcal 65+ years (1 of 1 - PPSV23) 10/30/2020 (Originally 2/3/2019)    HEMOGLOBIN A1C Q6M  03/22/2020    MICROALBUMIN Q1  09/19/2020    LIPID PANEL Q1  09/22/2020    MEDICARE YEARLY EXAM  01/30/2021    GLAUCOMA SCREENING Q2Y  11/07/2021    EYE EXAM RETINAL OR DILATED  11/07/2021    Hepatitis C Screening  Completed    Influenza Age 5 to Adult  Completed       I have discussed the diagnosis with the patient and the intended plan as seen in the above orders. The patient has received an After-Visit Summary and questions were answered concerning future plans. An After Visit Summary was printed and given to the patient. All diagnosis have been discussed with the patient and all of the patient's questions have been answered. Follow-up and Dispositions    · Return for make appt with new PCP in 3 months to establish care. Juan Rivera, [facility]  [address] [id number] [facility] [address] [address]  This is the Subsequent Medicare Annual Wellness Exam, performed 12 months or more after the Initial AWV or the last Subsequent AWV    I have reviewed the patient's medical history in detail and updated the computerized patient record.      History     Patient Active Problem List   Diagnosis Code    Essential hypertension I10    Hx-TIA (transient ischemic attack) Z86.73    High cholesterol E78.00    Gout M10.9    Erectile disorder due to medical condition in male N52.1    Type 2 diabetes mellitus with hyperglycemia, with long-term current use of insulin (HCC) E11.65, Z79.4    Prostate cancer ([facility] C61    Pacemaker Z95.0    BMI 40.0-44.9, adult ([facility] Z68.41    Congestive heart failure (HCC) I50.9    Pulmonary emphysema (HCC) J43.9    Cardiomyopathy (HCC) I42.9    Chronic systolic CHF (congestive heart failure), NYHA class 2 ([facility] I50.22    Noncompliance with treatment plan Z91.11    Paroxysmal atrial fibrillation (HCC) I48.0    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 pre-bx PSA 4.65 ng/mL. Past Surgical History:   Procedure Laterality Date    HX CIRCUMCISION  2014    HX COLONOSCOPY      HX OTHER SURGICAL Right 2011    fractured ankle. Has 1 screw    HX PACEMAKER PLACEMENT  2012    HX UROLOGICAL  02/08/2018    PNBx. TRUS Vol 54 grams. Greenleaf 3+4 in RLM. Greenleaf 3+3 in 5/12 cores. Dr. Bonny Bartholomew. Current Outpatient Medications   Medication Sig Dispense Refill    SITagliptin-metFORMIN (JANUMET)  mg per tablet Take 1 Tab by mouth two (2) times daily (with meals). 180 Tab 1    meclizine (ANTIVERT) 12.5 mg tablet Take 1 Tab by mouth three (3) times daily as needed for Dizziness. 30 Tab 1    empagliflozin (JARDIANCE) 25 mg tablet Take 1 Tab by mouth daily. 90 Tab 3    fluticasone propion-salmeterol (ADVAIR HFA) 115-21 mcg/actuation inhaler Take 2 Puffs by inhalation two (2) times a day. 1 Inhaler 5    aspirin 81 mg chewable tablet Take 1 Tab by mouth daily. 90 Tab 3    simvastatin (ZOCOR) 20 mg tablet Take 1 Tab by mouth nightly. 90 Tab 1    losartan (COZAAR) 100 mg tablet Take 1 Tab by mouth daily. 90 Tab 0    metoprolol succinate (TOPROL-XL) 100 mg tablet Take 1 Tab by mouth daily. 90 Tab 0    LANTUS SOLOSTAR U-100 INSULIN 100 unit/mL (3 mL) inpn 62 Units by SubCUTAneous route Before breakfast and dinner.  Indications: type 2 diabetes mellitus 15 Adjustable Dose Pre-filled Pen Syringe 2    tiotropium (SPIRIVA) 18 mcg inhalation capsule Take 1 Cap by inhalation daily. 30 Cap 2    tamsulosin (FLOMAX) 0.4 mg capsule Take 1 Cap by mouth daily. 90 Cap 3    bumetanide (BUMEX) 1 mg tablet Take 1 Tab by mouth two (2) times a day. 180 Tab 3    spironolactone (ALDACTONE) 25 mg tablet Take 1 Tab by mouth two (2) times a day. 180 Tab 3    OTHER,NON-FORMULARY, 1 mL by IntraCAVernosal route as needed. 5 Each 5    allopurinol (ZYLOPRIM) 100 mg tablet Take 100 mg by mouth daily.  APIDRA SOLOSTAR 100 unit/mL pen Indications: sliding scale      XARELTO 20 mg tab tablet Take 20 mg by mouth daily (with breakfast). No Known Allergies    Family History   Problem Relation Age of Onset    Stroke Mother     Heart Attack Father      Social History     Tobacco Use    Smoking status: Former Smoker     Types: Cigarettes     Last attempt to quit:      Years since quittin.0    Smokeless tobacco: Never Used   Substance Use Topics    Alcohol use: Yes     Alcohol/week: 1.0 standard drinks     Types: 1 Shots of liquor per week     Comment: occasionally       Depression Risk Factor Screening:     3 most recent PHQ Screens 2020   Little interest or pleasure in doing things Not at all   Feeling down, depressed, irritable, or hopeless Not at all   Total Score PHQ 2 0       Alcohol Risk Factor Screening (MALE > 65): Do you average more 1 drink per night or more than 7 drinks a week: No    In the past three months have you have had more than 4 drinks containing alcohol on one occasion: No      Functional Ability and Level of Safety:   Hearing: Hearing is good. Activities of Daily Living: The home contains: no safety equipment. Patient does total self care    Ambulation: with no difficulty    Fall Risk:  Fall Risk Assessment, last 12 mths 2020   Able to walk? Yes   Fall in past 12 months?  No       Abuse Screen:  Patient is not abused    Cognitive Screening   Has your family/caregiver stated any concerns about your memory: no  Cognitive Screening: Normal - Clock Drawing Test    Patient Care Team   Patient Care Team:  Greta Gonzalez NP as PCP - General (Nurse Practitioner)  Greta Gonzalez NP as PCP - [facility] Provider  Brianne Peters RN as Ambulatory Care Manager    Assessment/Plan   Education and counseling provided:  Are appropriate based on today's review and evaluation    Diagnoses and all orders for this visit:    1. Medicare annual wellness visit, subsequent  -     [place] SCREEN [address]    2. Screening for alcoholism  -     [place] AN[place] SCREEN 15 MIN    3. Screening for depression  -     DEPRESSION SCREEN ANNUAL    4. Vertigo  -     meclizine (ANTIVERT) 12.5 mg tablet; Take 1 Tab by mouth three (3) times daily as needed for Dizziness. 5. Type 2 diabetes mellitus with hyperglycemia, with long-term current use of insulin (HCC)  -     SITagliptin-metFORMIN (JANUMET)  mg per tablet; Take 1 Tab by mouth two (2) times daily (with meals). -     empagliflozin (JARDIANCE) 25 mg tablet; Take 1 Tab by mouth daily. 6. Essential hypertension    7.  High cholesterol        Health Maintenance Due   Topic Date Due    FOOT EXAM Q1  01/14/1965    FOBT Q 1 YEAR AGE 50-75  01/14/2003

## 2020-04-09 DIAGNOSIS — I10 ESSENTIAL HYPERTENSION: ICD-10-CM

## 2020-04-10 RX ORDER — LOSARTAN POTASSIUM 100 MG/1
100 TABLET ORAL DAILY
Qty: 90 TAB | Refills: 0 | Status: SHIPPED | OUTPATIENT
Start: 2020-04-10 | End: 2020-10-06 | Stop reason: SDUPTHER

## 2020-05-28 DIAGNOSIS — E78.00 HIGH CHOLESTEROL: ICD-10-CM

## 2020-05-28 DIAGNOSIS — I10 ESSENTIAL HYPERTENSION: ICD-10-CM

## 2020-05-28 RX ORDER — RIVAROXABAN 20 MG/1
20 TABLET, FILM COATED ORAL
Qty: 90 TAB | Refills: 0 | Status: SHIPPED | OUTPATIENT
Start: 2020-05-28 | End: 2021-02-10 | Stop reason: SDUPTHER

## 2020-05-28 RX ORDER — METOPROLOL SUCCINATE 100 MG/1
100 TABLET, EXTENDED RELEASE ORAL DAILY
Qty: 90 TAB | Refills: 0 | Status: SHIPPED | OUTPATIENT
Start: 2020-05-28 | End: 2020-10-06 | Stop reason: SDUPTHER

## 2020-05-28 RX ORDER — SIMVASTATIN 20 MG/1
20 TABLET, FILM COATED ORAL
Qty: 90 TAB | Refills: 0 | Status: SHIPPED | OUTPATIENT
Start: 2020-05-28 | End: 2020-10-06 | Stop reason: SDUPTHER

## 2020-05-28 RX ORDER — TAMSULOSIN HYDROCHLORIDE 0.4 MG/1
0.4 CAPSULE ORAL DAILY
Qty: 90 CAP | Refills: 0 | Status: SHIPPED | OUTPATIENT
Start: 2020-05-28 | End: 2020-10-06 | Stop reason: SDUPTHER

## 2020-05-28 NOTE — TELEPHONE ENCOUNTER
Pt is asking if Isabelle Venegas can refill his medications. He was unable to establish care with new pcp due to covid-19.   He says he is out of medication   He is requesting refills for   Tamsulosin, metoprolol, simvastatin and xarelto

## 2020-07-29 DIAGNOSIS — E11.65 TYPE 2 DIABETES MELLITUS WITH HYPERGLYCEMIA, WITH LONG-TERM CURRENT USE OF INSULIN (HCC): ICD-10-CM

## 2020-07-29 DIAGNOSIS — Z79.4 TYPE 2 DIABETES MELLITUS WITH HYPERGLYCEMIA, WITH LONG-TERM CURRENT USE OF INSULIN (HCC): ICD-10-CM

## 2020-07-29 DIAGNOSIS — J44.9 CHRONIC OBSTRUCTIVE PULMONARY DISEASE, UNSPECIFIED COPD TYPE (HCC): ICD-10-CM

## 2020-10-06 ENCOUNTER — OFFICE VISIT (OUTPATIENT)
Dept: FAMILY MEDICINE CLINIC | Age: 65
End: 2020-10-06
Payer: MEDICARE

## 2020-10-06 ENCOUNTER — HOSPITAL ENCOUNTER (OUTPATIENT)
Dept: LAB | Age: 65
Discharge: HOME | End: 2020-10-06
Payer: MEDICARE

## 2020-10-06 VITALS
HEART RATE: 113 BPM | BODY MASS INDEX: 41.09 KG/M2 | HEIGHT: 70 IN | TEMPERATURE: 98.3 F | RESPIRATION RATE: 18 BRPM | SYSTOLIC BLOOD PRESSURE: 149 MMHG | WEIGHT: 287 LBS | OXYGEN SATURATION: 95 % | DIASTOLIC BLOOD PRESSURE: 82 MMHG

## 2020-10-06 DIAGNOSIS — E11.65 TYPE 2 DIABETES MELLITUS WITH HYPERGLYCEMIA, WITH LONG-TERM CURRENT USE OF INSULIN (HCC): ICD-10-CM

## 2020-10-06 DIAGNOSIS — Z23 ENCOUNTER FOR IMMUNIZATION: ICD-10-CM

## 2020-10-06 DIAGNOSIS — I48.0 PAROXYSMAL ATRIAL FIBRILLATION (HCC): ICD-10-CM

## 2020-10-06 DIAGNOSIS — Z79.4 TYPE 2 DIABETES MELLITUS WITH HYPERGLYCEMIA, WITH LONG-TERM CURRENT USE OF INSULIN (HCC): ICD-10-CM

## 2020-10-06 DIAGNOSIS — C61 PROSTATE CANCER (HCC): ICD-10-CM

## 2020-10-06 DIAGNOSIS — Z12.11 COLON CANCER SCREENING: ICD-10-CM

## 2020-10-06 DIAGNOSIS — I50.9 CONGESTIVE HEART FAILURE, UNSPECIFIED HF CHRONICITY, UNSPECIFIED HEART FAILURE TYPE (HCC): ICD-10-CM

## 2020-10-06 DIAGNOSIS — I10 ESSENTIAL HYPERTENSION: Primary | ICD-10-CM

## 2020-10-06 DIAGNOSIS — I10 ESSENTIAL HYPERTENSION: ICD-10-CM

## 2020-10-06 DIAGNOSIS — I42.9 CARDIOMYOPATHY, UNSPECIFIED TYPE (HCC): ICD-10-CM

## 2020-10-06 DIAGNOSIS — E78.00 HIGH CHOLESTEROL: ICD-10-CM

## 2020-10-06 DIAGNOSIS — I50.22 CHRONIC SYSTOLIC CHF (CONGESTIVE HEART FAILURE), NYHA CLASS 2 (HCC): ICD-10-CM

## 2020-10-06 DIAGNOSIS — J43.8 OTHER EMPHYSEMA (HCC): ICD-10-CM

## 2020-10-06 DIAGNOSIS — Z23 NEEDS FLU SHOT: ICD-10-CM

## 2020-10-06 LAB
ALBUMIN SERPL-MCNC: 3.8 G/DL (ref 3.4–5)
ALBUMIN/GLOB SERPL: 1 {RATIO} (ref 0.8–1.7)
ALP SERPL-CCNC: 83 U/L (ref 45–117)
ALT SERPL-CCNC: 30 U/L (ref 16–61)
ANION GAP SERPL CALC-SCNC: 6 MMOL/L (ref 3–18)
AST SERPL-CCNC: 13 U/L (ref 10–38)
BILIRUB SERPL-MCNC: 0.3 MG/DL (ref 0.2–1)
BUN SERPL-MCNC: 20 MG/DL (ref 7–18)
BUN/CREAT SERPL: 15 (ref 12–20)
CALCIUM SERPL-MCNC: 9.6 MG/DL (ref 8.5–10.1)
CHLORIDE SERPL-SCNC: 106 MMOL/L (ref 100–111)
CHOLEST SERPL-MCNC: 153 MG/DL
CO2 SERPL-SCNC: 33 MMOL/L (ref 21–32)
CREAT SERPL-MCNC: 1.34 MG/DL (ref 0.6–1.3)
EST. AVERAGE GLUCOSE BLD GHB EST-MCNC: 209 MG/DL
GLOBULIN SER CALC-MCNC: 3.7 G/DL (ref 2–4)
GLUCOSE SERPL-MCNC: 185 MG/DL (ref 74–99)
HBA1C MFR BLD: 8.9 % (ref 4.2–5.6)
HDLC SERPL-MCNC: 71 MG/DL (ref 40–60)
HDLC SERPL: 2.2 {RATIO} (ref 0–5)
LDLC SERPL CALC-MCNC: 55.2 MG/DL (ref 0–100)
LIPID PROFILE,FLP: ABNORMAL
POTASSIUM SERPL-SCNC: 4.2 MMOL/L (ref 3.5–5.5)
PROT SERPL-MCNC: 7.5 G/DL (ref 6.4–8.2)
SODIUM SERPL-SCNC: 145 MMOL/L (ref 136–145)
TRIGL SERPL-MCNC: 134 MG/DL (ref ?–150)
VLDLC SERPL CALC-MCNC: 26.8 MG/DL

## 2020-10-06 PROCEDURE — 3052F HG A1C>EQUAL 8.0%<EQUAL 9.0%: CPT | Performed by: NURSE PRACTITIONER

## 2020-10-06 PROCEDURE — 2022F DILAT RTA XM EVC RTNOPTHY: CPT | Performed by: NURSE PRACTITIONER

## 2020-10-06 PROCEDURE — 90471 IMMUNIZATION ADMIN: CPT | Performed by: NURSE PRACTITIONER

## 2020-10-06 PROCEDURE — 80061 LIPID PANEL: CPT

## 2020-10-06 PROCEDURE — G8427 DOCREV CUR MEDS BY ELIG CLIN: HCPCS | Performed by: NURSE PRACTITIONER

## 2020-10-06 PROCEDURE — 99214 OFFICE O/P EST MOD 30 MIN: CPT | Performed by: NURSE PRACTITIONER

## 2020-10-06 PROCEDURE — G0463 HOSPITAL OUTPT CLINIC VISIT: HCPCS | Performed by: NURSE PRACTITIONER

## 2020-10-06 PROCEDURE — G8417 CALC BMI ABV UP PARAM F/U: HCPCS | Performed by: NURSE PRACTITIONER

## 2020-10-06 PROCEDURE — G8756 NO BP MEASURE DOC: HCPCS | Performed by: NURSE PRACTITIONER

## 2020-10-06 PROCEDURE — 83036 HEMOGLOBIN GLYCOSYLATED A1C: CPT

## 2020-10-06 PROCEDURE — G8536 NO DOC ELDER MAL SCRN: HCPCS | Performed by: NURSE PRACTITIONER

## 2020-10-06 PROCEDURE — 3017F COLORECTAL CA SCREEN DOC REV: CPT | Performed by: NURSE PRACTITIONER

## 2020-10-06 PROCEDURE — 36415 COLL VENOUS BLD VENIPUNCTURE: CPT

## 2020-10-06 PROCEDURE — 80053 COMPREHEN METABOLIC PANEL: CPT

## 2020-10-06 PROCEDURE — 90694 VACC AIIV4 NO PRSRV 0.5ML IM: CPT | Performed by: NURSE PRACTITIONER

## 2020-10-06 PROCEDURE — 1101F PT FALLS ASSESS-DOCD LE1/YR: CPT | Performed by: NURSE PRACTITIONER

## 2020-10-06 PROCEDURE — G8432 DEP SCR NOT DOC, RNG: HCPCS | Performed by: NURSE PRACTITIONER

## 2020-10-06 RX ORDER — EXENATIDE 2 MG/.85ML
INJECTION, SUSPENSION, EXTENDED RELEASE SUBCUTANEOUS
COMMUNITY
Start: 2020-09-29

## 2020-10-06 RX ORDER — LOSARTAN POTASSIUM 100 MG/1
100 TABLET ORAL DAILY
Qty: 90 TAB | Refills: 0 | Status: SHIPPED | OUTPATIENT
Start: 2020-10-06 | End: 2021-02-10 | Stop reason: SDUPTHER

## 2020-10-06 RX ORDER — ALBUTEROL SULFATE 90 UG/1
1-2 AEROSOL, METERED RESPIRATORY (INHALATION)
Qty: 1 INHALER | Refills: 5 | Status: ON HOLD | OUTPATIENT
Start: 2020-10-06 | End: 2020-12-17

## 2020-10-06 RX ORDER — FLASH GLUCOSE SENSOR
KIT MISCELLANEOUS
COMMUNITY
Start: 2020-07-23

## 2020-10-06 RX ORDER — METOPROLOL SUCCINATE 100 MG/1
100 TABLET, EXTENDED RELEASE ORAL DAILY
Qty: 90 TAB | Refills: 0 | Status: SHIPPED | OUTPATIENT
Start: 2020-10-06 | End: 2021-03-15 | Stop reason: SDUPTHER

## 2020-10-06 RX ORDER — FLUTICASONE PROPIONATE AND SALMETEROL XINAFOATE 115; 21 UG/1; UG/1
2 AEROSOL, METERED RESPIRATORY (INHALATION) 2 TIMES DAILY
Qty: 1 INHALER | Refills: 5 | Status: SHIPPED | OUTPATIENT
Start: 2020-10-06 | End: 2021-12-06 | Stop reason: SDUPTHER

## 2020-10-06 RX ORDER — TAMSULOSIN HYDROCHLORIDE 0.4 MG/1
0.4 CAPSULE ORAL DAILY
Qty: 90 CAP | Refills: 0 | Status: SHIPPED | OUTPATIENT
Start: 2020-10-06 | End: 2021-03-02 | Stop reason: SDUPTHER

## 2020-10-06 RX ORDER — SPIRONOLACTONE 25 MG/1
25 TABLET ORAL 2 TIMES DAILY
Qty: 180 TAB | Refills: 3 | Status: SHIPPED | OUTPATIENT
Start: 2020-10-06 | End: 2021-06-01 | Stop reason: SDUPTHER

## 2020-10-06 RX ORDER — SIMVASTATIN 20 MG/1
20 TABLET, FILM COATED ORAL
Qty: 90 TAB | Refills: 0 | Status: SHIPPED | OUTPATIENT
Start: 2020-10-06 | End: 2021-06-01 | Stop reason: SDUPTHER

## 2020-10-06 NOTE — ASSESSMENT & PLAN NOTE
Key CAD CHF Meds             metoprolol succinate (TOPROL-XL) 100 mg tablet (Taking) Take 1 Tab by mouth daily. simvastatin (ZOCOR) 20 mg tablet (Taking) Take 1 Tab by mouth nightly. Xarelto 20 mg tab tablet (Taking) Take 1 Tab by mouth daily (with breakfast). losartan (COZAAR) 100 mg tablet (Taking) Take 1 Tab by mouth daily. aspirin 81 mg chewable tablet (Taking) Take 1 Tab by mouth daily. bumetanide (BUMEX) 1 mg tablet (Taking) Take 1 Tab by mouth two (2) times a day. spironolactone (ALDACTONE) 25 mg tablet (Taking) Take 1 Tab by mouth two (2) times a day.         Lab Results   Component Value Date/Time    Sodium 140 09/19/2019 09:36 AM    Potassium 4.1 09/19/2019 09:36 AM    Cholesterol, total 140 09/19/2019 09:36 AM    HDL Cholesterol 61 09/19/2019 09:36 AM    LDL, calculated 56.4 09/19/2019 09:36 AM    Triglyceride 113 09/19/2019 09:36 AM

## 2020-10-06 NOTE — PATIENT INSTRUCTIONS
DASH Diet: Care Instructions Your Care Instructions The DASH diet is an eating plan that can help lower your blood pressure. DASH stands for Dietary Approaches to Stop Hypertension. Hypertension is high blood pressure. The DASH diet focuses on eating foods that are high in calcium, potassium, and magnesium. These nutrients can lower blood pressure. The foods that are highest in these nutrients are fruits, vegetables, low-fat dairy products, nuts, seeds, and legumes. But taking calcium, potassium, and magnesium supplements instead of eating foods that are high in those nutrients does not have the same effect. The DASH diet also includes whole grains, fish, and poultry. The DASH diet is one of several lifestyle changes your doctor may recommend to lower your high blood pressure. Your doctor may also want you to decrease the amount of sodium in your diet. Lowering sodium while following the DASH diet can lower blood pressure even further than just the DASH diet alone. Follow-up care is a key part of your treatment and safety. Be sure to make and go to all appointments, and call your doctor if you are having problems. It's also a good idea to know your test results and keep a list of the medicines you take. How can you care for yourself at home? Following the DASH diet · Eat 4 to 5 servings of fruit each day. A serving is 1 medium-sized piece of fruit, ½ cup chopped or canned fruit, 1/4 cup dried fruit, or 4 ounces (½ cup) of fruit juice. Choose fruit more often than fruit juice. · Eat 4 to 5 servings of vegetables each day. A serving is 1 cup of lettuce or raw leafy vegetables, ½ cup of chopped or cooked vegetables, or 4 ounces (½ cup) of vegetable juice. Choose vegetables more often than vegetable juice. · Get 2 to 3 servings of low-fat and fat-free dairy each day. A serving is 8 ounces of milk, 1 cup of yogurt, or 1 ½ ounces of cheese. · Eat 6 to 8 servings of grains each day. A serving is 1 slice of bread, 1 ounce of dry cereal, or ½ cup of cooked rice, pasta, or cooked cereal. Try to choose whole-grain products as much as possible. · Limit lean meat, poultry, and fish to 2 servings each day. A serving is 3 ounces, about the size of a deck of cards. · Eat 4 to 5 servings of nuts, seeds, and legumes (cooked dried beans, lentils, and split peas) each week. A serving is 1/3 cup of nuts, 2 tablespoons of seeds, or ½ cup of cooked beans or peas. · Limit fats and oils to 2 to 3 servings each day. A serving is 1 teaspoon of vegetable oil or 2 tablespoons of salad dressing. · Limit sweets and added sugars to 5 servings or less a week. A serving is 1 tablespoon jelly or jam, ½ cup sorbet, or 1 cup of lemonade. · Eat less than 2,300 milligrams (mg) of sodium a day. If you limit your sodium to 1,500 mg a day, you can lower your blood pressure even more. Tips for success · Start small. Do not try to make dramatic changes to your diet all at once. You might feel that you are missing out on your favorite foods and then be more likely to not follow the plan. Make small changes, and stick with them. Once those changes become habit, add a few more changes. · Try some of the following: ? Make it a goal to eat a fruit or vegetable at every meal and at snacks. This will make it easy to get the recommended amount of fruits and vegetables each day. ? Try yogurt topped with fruit and nuts for a snack or healthy dessert. ? Add lettuce, tomato, cucumber, and onion to sandwiches. ? Combine a ready-made pizza crust with low-fat mozzarella cheese and lots of vegetable toppings. Try using tomatoes, squash, spinach, broccoli, carrots, cauliflower, and onions. ? Have a variety of cut-up vegetables with a low-fat dip as an appetizer instead of chips and dip. ? Sprinkle sunflower seeds or chopped almonds over salads.  Or try adding chopped walnuts or almonds to cooked vegetables. ? Try some vegetarian meals using beans and peas. Add garbanzo or kidney beans to salads. Make burritos and tacos with mashed youngblood beans or black beans. Where can you learn more? Go to http://www.gray.com/ Enter B864 in the search box to learn more about \"DASH Diet: Care Instructions. \" Current as of: December 16, 2019               Content Version: 12.6 © 1723-0435 [employer]. Care instructions adapted under license by [employer] (which disclaims liability or warranty for this information). If you have questions about a medical condition or this instruction, always ask your healthcare professional. [address] any warranty or liability for your use of this information. Learning About the [address] Diet What is the Mediterranean diet? The Mediterranean diet is a style of eating rather than a diet plan. It features foods eaten in [place], [place], [place] and [place], and other countries along the [facility]. It emphasizes eating foods like fish, fruits, vegetables, beans, high-fiber breads and whole grains, nuts, and olive oil. This style of eating includes limited red meat, cheese, and sweets. Why choose the Mediterranean diet? A Mediterranean-style diet may improve heart health. It contains more fat than other heart-healthy diets. But the fats are mainly from nuts, unsaturated oils (such as fish oils and olive oil), and certain nut or seed oils (such as canola, soybean, or flaxseed oil). These fats may help protect the heart and blood vessels. How can you get started on the Mediterranean diet? Here are some things you can do to switch to a more Mediterranean way of eating. What to eat · Eat a variety of fruits and vegetables each day, such as grapes, blueberries, tomatoes, broccoli, peppers, figs, olives, spinach, eggplant, beans, lentils, and chickpeas. · Eat a variety of whole-grain foods each day, such as oats, brown rice, and whole wheat bread, pasta, and couscous. · Eat fish at least 2 times a week. Try tuna, salmon, mackerel, lake trout, herring, or sardines. · Eat moderate amounts of low-fat dairy products, such as milk, cheese, or yogurt. · Eat moderate amounts of poultry and eggs. · Choose healthy (unsaturated) fats, such as nuts, olive oil, and certain nut or seed oils like canola, soybean, and flaxseed. · Limit unhealthy (saturated) fats, such as butter, palm oil, and coconut oil. And limit fats found in animal products, such as meat and dairy products made with whole milk. Try to eat red meat only a few times a month in very small amounts. · Limit sweets and desserts to only a few times a week. This includes sugar-sweetened drinks like soda. The Mediterranean diet may also include red wine with your meal1 glass each day for women and up to 2 glasses a day for men. Tips for eating at home · Use herbs, spices, garlic, lemon zest, and citrus juice instead of salt to add flavor to foods. · Add avocado slices to your sandwich instead of samson. · Have fish for lunch or dinner instead of red meat. Brush the fish with olive oil, and broil or grill it. · Sprinkle your salad with seeds or nuts instead of cheese. · Cook with olive or canola oil instead of butter or oils that are high in saturated fat. · Switch from 2% milk or whole milk to 1% or fat-free milk. · Dip raw vegetables in a vinaigrette dressing or hummus instead of dips made from mayonnaise or sour cream. 
· Have a piece of fruit for dessert instead of a piece of cake. Try baked apples, or have some dried fruit. Tips for eating out · Try broiled, grilled, baked, or poached fish instead of having it fried or breaded. · Ask your  to have your meals prepared with olive oil instead of butter. · Order dishes made with marinara sauce or sauces made from olive oil. Avoid sauces made from cream or mayonnaise. · Choose whole-grain breads, whole wheat pasta and pizza crust, brown rice, beans, and lentils. · Cut back on butter or margarine on bread. Instead, you can dip your bread in a small amount of olive oil. · Ask for a side salad or grilled vegetables instead of french fries or chips. Where can you learn more? Go to http://www.RuckPack/ Enter 195-987-6319 in the search box to learn more about \"Learning About the Mediterranean Diet. \" Current as of: August 22, 2019               Content Version: 12.6 © 8113-7542 [employer]. Care instructions adapted under license by [employer] (which disclaims liability or warranty for this information). If you have questions about a medical condition or this instruction, always ask your healthcare professional. [place] [address] any warranty or liability for your use of this information.

## 2020-10-06 NOTE — ASSESSMENT & PLAN NOTE
This condition is managed by Specialist.  Key CAD CHF Meds             metoprolol succinate (TOPROL-XL) 100 mg tablet (Taking) Take 1 Tab by mouth daily. simvastatin (ZOCOR) 20 mg tablet (Taking) Take 1 Tab by mouth nightly. Xarelto 20 mg tab tablet (Taking) Take 1 Tab by mouth daily (with breakfast). losartan (COZAAR) 100 mg tablet (Taking) Take 1 Tab by mouth daily. aspirin 81 mg chewable tablet (Taking) Take 1 Tab by mouth daily. bumetanide (BUMEX) 1 mg tablet (Taking) Take 1 Tab by mouth two (2) times a day. spironolactone (ALDACTONE) 25 mg tablet (Taking) Take 1 Tab by mouth two (2) times a day.         Lab Results   Component Value Date/Time    Sodium 140 09/19/2019 09:36 AM    Potassium 4.1 09/19/2019 09:36 AM    Cholesterol, total 140 09/19/2019 09:36 AM    HDL Cholesterol 61 09/19/2019 09:36 AM    LDL, calculated 56.4 09/19/2019 09:36 AM    Triglyceride 113 09/19/2019 09:36 AM

## 2020-10-06 NOTE — ASSESSMENT & PLAN NOTE
This condition is managed by Specialist.  Key Oncology Meds             meclizine (ANTIVERT) 12.5 mg tablet (Taking) Take 1 Tab by mouth three (3) times daily as needed for Dizziness. Key Pain Meds     The patient is on no pain meds.         Lab Results   Component Value Date/Time    WBC 9.8 09/19/2019 09:36 AM    HGB 14.4 09/19/2019 09:36 AM    HCT 45.1 09/19/2019 09:36 AM    PLATELET 150 62/47/6022 09:36 AM    Creatinine 1.06 09/19/2019 09:36 AM    BUN 15 09/19/2019 09:36 AM    ALT (SGPT) 32 09/19/2019 09:36 AM    Albumin 3.5 09/19/2019 09:36 AM    Prostate Specific Ag 0.511 08/27/2019

## 2020-10-06 NOTE — ASSESSMENT & PLAN NOTE
Stable, based on history, physical exam and review of pertinent labs, studies and medications; meds reconciled; continue current treatment plan. Key COPD Medications             fluticasone propion-salmeterol (ADVAIR HFA) 115-21 mcg/actuation inhaler (Taking) Take 2 Puffs by inhalation two (2) times a day. tiotropium (SPIRIVA) 18 mcg inhalation capsule (Taking) Take 1 Cap by inhalation daily.         Lab Results   Component Value Date/Time    WBC 9.8 09/19/2019 09:36 AM    HGB 14.4 09/19/2019 09:36 AM    HCT 45.1 09/19/2019 09:36 AM    PLATELET 649 39/91/5368 09:36 AM

## 2020-10-06 NOTE — PROGRESS NOTES
Chief Complaint   Patient presents with    CHF       1. Have you been to the ER, urgent care clinic since your last visit? Hospitalized since your last visit?last week x 1 week at [facility] - Allergic reaction- panic     2. Have you seen or consulted any other health care providers outside of the [address] [place] [address] since your last visit? Include any pap smears or colon screening. Cardiology, endocrinology Dr. Chantel Uribe      After obtaining consent, and per orders of JUAQUIN Ley, injection of Fluad given by Natalee Trimble. Patient instructed to remain in clinic for 20 minutes afterwards, and to report any adverse reaction to me immediately. Pt tolerated well. Pt had NO adverse reactions noted.

## 2020-10-06 NOTE — PROGRESS NOTES
[address] [place] [address]               [phone]      Moises Miller is a 77 y.o. male and presents with CHF       Assessment/Plan:    Diagnoses and all orders for this visit:    1. Essential hypertension  -     losartan (COZAAR) 100 mg tablet; Take 1 Tab by mouth daily. -     spironolactone (ALDACTONE) 25 mg tablet; Take 1 Tab by mouth two (2) times a day. -     METABOLIC PANEL, COMPREHENSIVE; Future  -     metoprolol succinate (TOPROL-XL) 100 mg tablet; Take 1 Tab by mouth daily.  -     AMB SUPPLY ORDER  He has had difficulty obtaining appointments with a provider closer to his home, so he comes in today for routine f/u and continued care  Refills provided  Blood pressure slightly elevated at todays visit, f/u 4 weeks  Home b/p machine ordered for home use  Labs to monitor hepatic and renal function    2. High cholesterol  -     LIPID PANEL; Future  -     simvastatin (ZOCOR) 20 mg tablet; Take 1 Tab by mouth nightly. F/u labs  Refill provided    3. BMI 40.0-44.9, adult [facility])  Discussed the patient's BMI. The BMI follow up plan is as follows:     dietary management education, guidance, and counseling  encourage exercise  monitor weight  prescribed dietary intake  Discussed portion control  Eat fresh or frozen fruits and vegetables, stay away from canned  Limit eating out and fast food  Practice meal planning    4. Type 2 diabetes mellitus with hyperglycemia, with long-term current use of insulin ([facility])  -     HEMOGLOBIN A1C WITH EAG; Future  -      DIABETES FOOT EXAM  -     LIPID PANEL; Future  managed by endocrine  F/u A1C    5. Other emphysema ([address]  Refills provided    Assessment & Plan:  Stable, based on history, physical exam and review of pertinent labs, studies and medications; meds reconciled; continue current treatment plan.   Key COPD Medications             fluticasone propion-salmeterol (ADVAIR HFA) 115-21 mcg/actuation inhaler (Taking) Take 2 Puffs by inhalation two (2) times a day. tiotropium (SPIRIVA) 18 mcg inhalation capsule (Taking) Take 1 Cap by inhalation daily. Lab Results   Component Value Date/Time    WBC 9.8 09/19/2019 09:36 AM    HGB 14.4 09/19/2019 09:36 AM    HCT 45.1 09/19/2019 09:36 AM    PLATELET 636 60/79/4093 09:36 AM       Orders:  -     fluticasone propion-salmeteroL (Advair HFA) 115-21 mcg/actuation inhaler; Take 2 Puffs by inhalation two (2) times a day. -     albuterol (PROVENTIL HFA, VENTOLIN HFA, PROAIR HFA) 90 mcg/actuation inhaler; Take 1-2 Puffs by inhalation every four (4) hours as needed for Wheezing. 6. Paroxysmal atrial fibrillation [facility])  Assessment & Plan: This condition is managed by Specialist.  Key CAD CHF Meds             metoprolol succinate (TOPROL-XL) 100 mg tablet (Taking) Take 1 Tab by mouth daily. simvastatin (ZOCOR) 20 mg tablet (Taking) Take 1 Tab by mouth nightly. Xarelto 20 mg tab tablet (Taking) Take 1 Tab by mouth daily (with breakfast). losartan (COZAAR) 100 mg tablet (Taking) Take 1 Tab by mouth daily. aspirin 81 mg chewable tablet (Taking) Take 1 Tab by mouth daily. bumetanide (BUMEX) 1 mg tablet (Taking) Take 1 Tab by mouth two (2) times a day. spironolactone (ALDACTONE) 25 mg tablet (Taking) Take 1 Tab by mouth two (2) times a day. Lab Results   Component Value Date/Time    Sodium 140 09/19/2019 09:36 AM    Potassium 4.1 09/19/2019 09:36 AM    Cholesterol, total 140 09/19/2019 09:36 AM    HDL Cholesterol 61 09/19/2019 09:36 AM    LDL, calculated 56.4 09/19/2019 09:36 AM    Triglyceride 113 09/19/2019 09:36 AM         7. Chronic systolic CHF (congestive heart failure), NYHA class 2 ([facility])  Assessment & Plan:    Key CAD CHF Meds             metoprolol succinate (TOPROL-XL) 100 mg tablet (Taking) Take 1 Tab by mouth daily. simvastatin (ZOCOR) 20 mg tablet (Taking) Take 1 Tab by mouth nightly.     Xarelto 20 mg tab tablet (Taking) Take 1 Tab by mouth daily (with breakfast). losartan (COZAAR) 100 mg tablet (Taking) Take 1 Tab by mouth daily. aspirin 81 mg chewable tablet (Taking) Take 1 Tab by mouth daily. bumetanide (BUMEX) 1 mg tablet (Taking) Take 1 Tab by mouth two (2) times a day. spironolactone (ALDACTONE) 25 mg tablet (Taking) Take 1 Tab by mouth two (2) times a day. Lab Results   Component Value Date/Time    Sodium 140 09/19/2019 09:36 AM    Potassium 4.1 09/19/2019 09:36 AM    Cholesterol, total 140 09/19/2019 09:36 AM    HDL Cholesterol 61 09/19/2019 09:36 AM    LDL, calculated 56.4 09/19/2019 09:36 AM    Triglyceride 113 09/19/2019 09:36 AM       Orders:  -     AMB SUPPLY ORDER    8. Cardiomyopathy, unspecified type [facility])  Assessment & Plan: This condition is managed by Specialist.  Key CAD CHF Meds             metoprolol succinate (TOPROL-XL) 100 mg tablet (Taking) Take 1 Tab by mouth daily. simvastatin (ZOCOR) 20 mg tablet (Taking) Take 1 Tab by mouth nightly. Xarelto 20 mg tab tablet (Taking) Take 1 Tab by mouth daily (with breakfast). losartan (COZAAR) 100 mg tablet (Taking) Take 1 Tab by mouth daily. aspirin 81 mg chewable tablet (Taking) Take 1 Tab by mouth daily. bumetanide (BUMEX) 1 mg tablet (Taking) Take 1 Tab by mouth two (2) times a day. spironolactone (ALDACTONE) 25 mg tablet (Taking) Take 1 Tab by mouth two (2) times a day. Lab Results   Component Value Date/Time    Sodium 140 09/19/2019 09:36 AM    Potassium 4.1 09/19/2019 09:36 AM    Cholesterol, total 140 09/19/2019 09:36 AM    HDL Cholesterol 61 09/19/2019 09:36 AM    LDL, calculated 56.4 09/19/2019 09:36 AM    Triglyceride 113 09/19/2019 09:36 AM         9. Congestive heart failure, unspecified HF chronicity, unspecified heart failure type [facility])  Assessment & Plan: This condition is managed by Specialist.  Key CAD CHF Meds             metoprolol succinate (TOPROL-XL) 100 mg tablet (Taking) Take 1 Tab by mouth daily.     simvastatin (ZOCOR) 20 mg tablet (Taking) Take 1 Tab by mouth nightly. Xarelto 20 mg tab tablet (Taking) Take 1 Tab by mouth daily (with breakfast). losartan (COZAAR) 100 mg tablet (Taking) Take 1 Tab by mouth daily. aspirin 81 mg chewable tablet (Taking) Take 1 Tab by mouth daily. bumetanide (BUMEX) 1 mg tablet (Taking) Take 1 Tab by mouth two (2) times a day. spironolactone (ALDACTONE) 25 mg tablet (Taking) Take 1 Tab by mouth two (2) times a day. Lab Results   Component Value Date/Time    Sodium 140 09/19/2019 09:36 AM    Potassium 4.1 09/19/2019 09:36 AM    Cholesterol, total 140 09/19/2019 09:36 AM    HDL Cholesterol 61 09/19/2019 09:36 AM    LDL, calculated 56.4 09/19/2019 09:36 AM    Triglyceride 113 09/19/2019 09:36 AM         10. Prostate cancer ([facility]  Refill provided    Assessment & Plan: This condition is managed by Specialist.  Key Oncology Meds             meclizine (ANTIVERT) 12.5 mg tablet (Taking) Take 1 Tab by mouth three (3) times daily as needed for Dizziness. Key Pain Meds     The patient is on no pain meds. Lab Results   Component Value Date/Time    WBC 9.8 09/19/2019 09:36 AM    HGB 14.4 09/19/2019 09:36 AM    HCT 45.1 09/19/2019 09:36 AM    PLATELET 500 24/95/6239 09:36 AM    Creatinine 1.06 09/19/2019 09:36 AM    BUN 15 09/19/2019 09:36 AM    ALT (SGPT) 32 09/19/2019 09:36 AM    Albumin 3.5 09/19/2019 09:36 AM    Prostate Specific Ag 0.511 08/27/2019       Orders:  -     tamsulosin (FLOMAX) 0.4 mg capsule; Take 1 Cap by mouth daily. 11. Colon cancer screening  -     REFERRAL TO GASTROENTEROLOGY  Health maintenance    12. Encounter for immunization  -     ADMIN INFLUENZA VIRUS VAC    13. Needs flu shot  -     FLU (FLUAD QUAD INFLUENZA VACCINE,QUAD,ADJUVANTED)  Health maintenance    Follow-up and Dispositions    · Return in about 4 weeks (around 11/3/2020) for HTN, COPD, sleep apnea, 30 min, office only.          Subjective:    DMII-   Patient reports medication compliance Daily  Diabetic diet compliance most of the time  Patient monitors blood sugars regularly using freestyle amanda, checks about 4-5 times a day   Reports am fasting sugars range 105-110   Denies hypoglycemic episodes yes  Denies polyuria, polydipsia, paraesthesia, vision changes? yes     Diabetic Foot and Eye Exam HM Status   Topic Date Due    Diabetic Foot Care  10/06/2021    Eye Exam  11/07/2021     Hemoglobin A1c   Date Value Ref Range Status   10/06/2020 8.9 (H) 4.2 - 5.6 % Final     Comment:     (NOTE)  HbA1C Interpretive Ranges  <5.7              Normal  5.7 - 6.4         Consider Prediabetes  >6.5              Consider Diabetes     ]  Creatinine, urine   Date Value Ref Range Status   09/19/2019 189.00 (H) 30 - 125 mg/dL Final     Microalbumin/Creat ratio (mg/g creat)   Date Value Ref Range Status   09/19/2019 16 0 - 30 mg/g Final     Key Antihyperglycemic Medications             Bydureon BCise 2 mg/0.85 mL atIn (Taking)     SITagliptin-metFORMIN (JANUMET)  mg per tablet (Taking) Take 1 Tab by mouth two (2) times daily (with meals). empagliflozin (JARDIANCE) 25 mg tablet (Taking) Take 1 Tab by mouth daily. LANTUS SOLOSTAR U-100 INSULIN 100 unit/mL (3 mL) inpn (Taking) 62 Units by SubCUTAneous route Before breakfast and dinner. Indications: type 2 diabetes mellitus    APIDRA SOLOSTAR 100 unit/mL pen (Taking) Indications: sliding scale        Hypertension:   Patient reports taking medications as instructed. yes   Medication side effects noted. no  Headache upon wakening. no   Home BP monitoring in range of does not check. Do you experience chest pain/pressure or SOB with exertion? no  Maintain a low salt diet? yes  Key CAD CHF Meds             losartan (COZAAR) 100 mg tablet (Taking) Take 1 Tab by mouth daily. spironolactone (ALDACTONE) 25 mg tablet (Taking) Take 1 Tab by mouth two (2) times a day. metoprolol succinate (TOPROL-XL) 100 mg tablet (Taking) Take 1 Tab by mouth daily. simvastatin (ZOCOR) 20 mg tablet (Taking) Take 1 Tab by mouth nightly. Xarelto 20 mg tab tablet (Taking) Take 1 Tab by mouth daily (with breakfast). aspirin 81 mg chewable tablet (Taking) Take 1 Tab by mouth daily. bumetanide (BUMEX) 1 mg tablet (Taking) Take 1 Tab by mouth two (2) times a day. COPD ROS: taking medications as instructed, no medication side effects noted, no significant ongoing wheezing or shortness of breath. New concerns: no.   Usage of albuterol inhaler rescue varies    ROS:     ROS  As stated in HPI, otherwise all others negative. The problem list was updated as a part of today's visit. Patient Active Problem List   Diagnosis Code    Essential hypertension I10    Hx-TIA (transient ischemic attack) Z86.73    High cholesterol E78.00    Gout M10.9    Erectile disorder due to medical condition in male N52.1    Type 2 diabetes mellitus with hyperglycemia, with long-term current use of insulin ([facility] E11.65, Z79.4    Prostate cancer ([facility] C61    Pacemaker Z95.0    BMI 40.0-44.9, adult ([facility] Z68.41    Congestive heart failure (HCC) I50.9    Pulmonary emphysema (HCC) J43.9    Cardiomyopathy (HCC) I42.9    Chronic systolic CHF (congestive heart failure), NYHA class 2 ([facility]) I50.22    Noncompliance with treatment plan Z91.11    Paroxysmal atrial fibrillation (HCC) I48.0    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 SH:  Social History     Tobacco Use    Smoking status: Former Smoker     Types: Cigarettes     Last attempt to quit: 2014     Years since quittin.7    Smokeless tobacco: Never Used   Substance Use Topics    Alcohol use:  Yes     Alcohol/week: 1.0 standard drinks     Types: 1 Shots of liquor per week     Comment: occasionally    Drug use: No       Medications/Allergies:  Current Outpatient Medications on File Prior to Visit   Medication Sig Dispense Refill    Byleda Graysonse 2 mg/0.85 mL atIn       FreeStyle Pasquale 14 Day Sensor kit apply 1 SENSOR TO THE BACK OF UPPER ARM REMOVE AND REPLACE every . ..  (REFER TO PRESCRIPTION NOTES).  Xarelto 20 mg tab tablet Take 1 Tab by mouth daily (with breakfast). 90 Tab 0    SITagliptin-metFORMIN (JANUMET)  mg per tablet Take 1 Tab by mouth two (2) times daily (with meals). 180 Tab 1    meclizine (ANTIVERT) 12.5 mg tablet Take 1 Tab by mouth three (3) times daily as needed for Dizziness. 30 Tab 1    empagliflozin (JARDIANCE) 25 mg tablet Take 1 Tab by mouth daily. 90 Tab 3    aspirin 81 mg chewable tablet Take 1 Tab by mouth daily. 90 Tab 3    LANTUS SOLOSTAR U-100 INSULIN 100 unit/mL (3 mL) inpn 62 Units by SubCUTAneous route Before breakfast and dinner. Indications: type 2 diabetes mellitus 15 Adjustable Dose Pre-filled Pen Syringe 2    tiotropium (SPIRIVA) 18 mcg inhalation capsule Take 1 Cap by inhalation daily. 30 Cap 2    bumetanide (BUMEX) 1 mg tablet Take 1 Tab by mouth two (2) times a day. 180 Tab 3    OTHER,NON-FORMULARY, 1 mL by IntraCAVernosal route as needed. 5 Each 5    allopurinol (ZYLOPRIM) 100 mg tablet Take 100 mg by mouth daily.  APIDRA SOLOSTAR 100 unit/mL pen Indications: sliding scale       No current facility-administered medications on file prior to visit. No Known Allergies    Objective:  Visit Vitals  BP (!) 149/82   Pulse (!) 113   Temp 98.3 °F (36.8 °C) (Oral)   Resp 18   Ht 5' 10\" (1.778 m)   Wt 287 lb (130.2 kg)   SpO2 95%   BMI 41.18 kg/m²    Body mass index is 41.18 kg/m². Physical assessment  Physical Exam  Vitals signs and nursing note reviewed. Eyes:      Conjunctiva/sclera: Conjunctivae normal.      Pupils: Pupils are equal, round, and reactive to light. Neck:      Musculoskeletal: Normal range of motion. Vascular: No JVD. Cardiovascular:      Rate and Rhythm: Normal rate and regular rhythm. Heart sounds: Normal heart sounds. No murmur. No friction rub. No gallop. Pulmonary:      Effort: Pulmonary effort is normal.      Breath sounds: Normal breath sounds. Musculoskeletal: Normal range of motion. Feet:      Comments: Diabetic foot exam:     Left: Reflexes 2+     Vibratory sensation normal    Proprioception normal   Filament test normal sensation with micro filament   Pulse DP: 2+ (normal)   Deformities: None  Right: Reflexes 2+   Vibratory sensation normal   Proprioception normal   Filament test normal sensation with micro filament   Pulse DP: 2+ (normal)   Deformities: None  Skin:     General: Skin is warm and dry. Neurological:      Mental Status: He is alert and oriented to person, place, and time.    Psychiatric:         Mood and Affect: Affect normal.         Cognition and Memory: Memory normal.         Judgment: Judgment normal.           Labwork and Ancillary Studies:    CBC w/Diff  Lab Results   Component Value Date/Time    WBC 9.8 09/19/2019 09:36 AM    HGB 14.4 09/19/2019 09:36 AM    PLATELET 188 15/64/1121 09:36 AM         Basic Metabolic Profile  Lab Results   Component Value Date/Time    Sodium 145 10/06/2020 03:05 PM    Potassium 4.2 10/06/2020 03:05 PM    Chloride 106 10/06/2020 03:05 PM    CO2 33 (H) 10/06/2020 03:05 PM    Anion gap 6 10/06/2020 03:05 PM    Glucose 185 (H) 10/06/2020 03:05 PM    BUN 20 (H) 10/06/2020 03:05 PM    Creatinine 1.34 (H) 10/06/2020 03:05 PM    BUN/Creatinine ratio 15 10/06/2020 03:05 PM    GFR est AA >60 10/06/2020 03:05 PM    GFR est non-AA 53 (L) 10/06/2020 03:05 PM    Calcium 9.6 10/06/2020 03:05 PM        Cholesterol  Lab Results   Component Value Date/Time    Cholesterol, total 153 10/06/2020 03:05 PM    HDL Cholesterol 71 (H) 10/06/2020 03:05 PM    LDL, calculated 55.2 10/06/2020 03:05 PM    Triglyceride 134 10/06/2020 03:05 PM    CHOL/HDL Ratio 2.2 10/06/2020 03:05 PM       Health Maintenance:   Health Maintenance   Topic Date Due    Shingrix Vaccine Age 50> (1 of 2) 01/14/2003    FOBT Q1Y Age 50-75  01/14/2003    MICROALBUMIN Q1  09/19/2020    Pneumococcal 65+ years (1 of 1 - PPSV23) 10/30/2020 (Originally 2/3/2019)    Medicare Yearly Exam  01/30/2021    Foot Exam Q1  10/06/2021    A1C test (Diabetic or Prediabetic)  10/06/2021    Lipid Screen  10/06/2021    GLAUCOMA SCREENING Q2Y  11/07/2021    Eye Exam Retinal or Dilated  11/07/2021    DTaP/Tdap/Td series (2 - Td) 03/31/2030    Hepatitis C Screening  Completed    Flu Vaccine  Completed       I have discussed the diagnosis with the patient and the intended plan as seen in the above orders. The patient has received an After-Visit Summary and questions were answered concerning future plans. An After Visit Summary was printed and given to the patient. All diagnosis have been discussed with the patient and all of the patient's questions have been answered. Follow-up and Dispositions    · Return in about 4 weeks (around 11/3/2020) for HTN, COPD, sleep apnea, 30 min, office only. Khris East, YUDI-BC  [address] [facility]   [address] [facility] [address]  [id number]

## 2020-11-10 ENCOUNTER — OFFICE VISIT (OUTPATIENT)
Dept: FAMILY MEDICINE CLINIC | Age: 65
End: 2020-11-10
Payer: MEDICARE

## 2020-11-10 VITALS
WEIGHT: 286 LBS | OXYGEN SATURATION: 96 % | HEIGHT: 70 IN | RESPIRATION RATE: 16 BRPM | BODY MASS INDEX: 40.94 KG/M2 | TEMPERATURE: 98 F | DIASTOLIC BLOOD PRESSURE: 67 MMHG | SYSTOLIC BLOOD PRESSURE: 116 MMHG | HEART RATE: 95 BPM

## 2020-11-10 DIAGNOSIS — B49 FUNGAL INFECTION: ICD-10-CM

## 2020-11-10 DIAGNOSIS — I10 ESSENTIAL HYPERTENSION: Primary | ICD-10-CM

## 2020-11-10 DIAGNOSIS — Z79.4 TYPE 2 DIABETES MELLITUS WITH HYPERGLYCEMIA, WITH LONG-TERM CURRENT USE OF INSULIN (HCC): ICD-10-CM

## 2020-11-10 DIAGNOSIS — E11.65 TYPE 2 DIABETES MELLITUS WITH HYPERGLYCEMIA, WITH LONG-TERM CURRENT USE OF INSULIN (HCC): ICD-10-CM

## 2020-11-10 DIAGNOSIS — G47.33 OSA (OBSTRUCTIVE SLEEP APNEA): ICD-10-CM

## 2020-11-10 DIAGNOSIS — J44.9 CHRONIC OBSTRUCTIVE PULMONARY DISEASE, UNSPECIFIED COPD TYPE (HCC): ICD-10-CM

## 2020-11-10 PROCEDURE — G8754 DIAS BP LESS 90: HCPCS | Performed by: NURSE PRACTITIONER

## 2020-11-10 PROCEDURE — G8427 DOCREV CUR MEDS BY ELIG CLIN: HCPCS | Performed by: NURSE PRACTITIONER

## 2020-11-10 PROCEDURE — 1101F PT FALLS ASSESS-DOCD LE1/YR: CPT | Performed by: NURSE PRACTITIONER

## 2020-11-10 PROCEDURE — G8417 CALC BMI ABV UP PARAM F/U: HCPCS | Performed by: NURSE PRACTITIONER

## 2020-11-10 PROCEDURE — G8752 SYS BP LESS 140: HCPCS | Performed by: NURSE PRACTITIONER

## 2020-11-10 PROCEDURE — 3052F HG A1C>EQUAL 8.0%<EQUAL 9.0%: CPT | Performed by: NURSE PRACTITIONER

## 2020-11-10 PROCEDURE — 3017F COLORECTAL CA SCREEN DOC REV: CPT | Performed by: NURSE PRACTITIONER

## 2020-11-10 PROCEDURE — G0463 HOSPITAL OUTPT CLINIC VISIT: HCPCS | Performed by: NURSE PRACTITIONER

## 2020-11-10 PROCEDURE — 99214 OFFICE O/P EST MOD 30 MIN: CPT | Performed by: NURSE PRACTITIONER

## 2020-11-10 PROCEDURE — 2022F DILAT RTA XM EVC RTNOPTHY: CPT | Performed by: NURSE PRACTITIONER

## 2020-11-10 PROCEDURE — G8536 NO DOC ELDER MAL SCRN: HCPCS | Performed by: NURSE PRACTITIONER

## 2020-11-10 PROCEDURE — G8432 DEP SCR NOT DOC, RNG: HCPCS | Performed by: NURSE PRACTITIONER

## 2020-11-10 RX ORDER — HYDROCODONE BITARTRATE AND ACETAMINOPHEN 5; 325 MG/1; MG/1
1 TABLET ORAL
COMMUNITY
Start: 2020-11-07 | End: 2020-12-07

## 2020-11-10 RX ORDER — BACITRACIN 500 [USP'U]/G
OINTMENT TOPICAL 3 TIMES DAILY
COMMUNITY
End: 2020-12-16

## 2020-11-10 RX ORDER — KETOCONAZOLE 20 MG/G
CREAM TOPICAL DAILY
Qty: 15 G | Refills: 0 | Status: SHIPPED | OUTPATIENT
Start: 2020-11-10 | End: 2020-12-16

## 2020-11-10 RX ORDER — BACITRACIN 500 [USP'U]/G
OINTMENT TOPICAL 3 TIMES DAILY
Status: CANCELLED | OUTPATIENT
Start: 2020-11-10

## 2020-11-10 RX ORDER — AMOXICILLIN AND CLAVULANATE POTASSIUM 875; 125 MG/1; MG/1
1 TABLET, FILM COATED ORAL EVERY 12 HOURS
COMMUNITY
Start: 2020-11-07 | End: 2020-11-14

## 2020-11-10 NOTE — PROGRESS NOTES
Chief Complaint   Patient presents with    Follow-up     4 week follow up     Hypertension    COPD     1. Have you been to the ER, urgent care clinic since your last visit? Hospitalized since your last visit? [facility] urology surgery     2. Have you seen or consulted any other health care providers outside of the [address] [place] [address] since your last visit? Include any pap smears or colon screening.  Urology

## 2020-11-10 NOTE — PROGRESS NOTES
[address] [place] [address]               [phone]      Marquis Hernandez is a 77 y.o. male and presents with Follow-up (4 week follow up ); Hypertension; and COPD       Assessment/Plan:    Diagnoses and all orders for this visit:    1. Essential hypertension  Blood pressure stable, continue same medications  Recently had labs done by his urologist, renal function stable electrolytes stable    2. Type 2 diabetes mellitus with hyperglycemia, with long-term current use of insulin ([facility])  -     MICROALBUMIN, UR, RAND W/ MICROALB/CREAT RATIO; Future  Endorses med compliance  A1c on 11/3/2020 was 8.4  Managed by endocrinology    3. Chronic obstructive pulmonary disease, unspecified COPD type ([facility]  -     tiotropium (SPIRIVA) 18 mcg inhalation capsule; Take 1 Cap by inhalation daily. Refill provided    4. EDWARD (obstructive sleep apnea)  -     REFERRAL TO SLEEP STUDIES  Discussed with him the importance of treating sleep apnea  Refer to sleep medicine    5. Fungal infection  -     ketoconazole (NIZORAL) 2 % topical cream; Apply  to affected area daily. Refill provided      Follow-up and Dispositions    · Return in about 3 months (around 2/10/2021) for DM, HLD, HTN, 30 min, VV. Subjective:    Hypertension:   Patient reports taking medications as instructed. yes   Medication side effects noted. no  Headache upon wakening. no   Home BP monitoring in range of unable to check, insurance would not cover his machine    Do you experience chest pain/pressure or SOB with exertion? no  Maintain a low salt diet? yes  Key CAD CHF Meds             losartan (COZAAR) 100 mg tablet (Taking) Take 1 Tab by mouth daily. spironolactone (ALDACTONE) 25 mg tablet (Taking) Take 1 Tab by mouth two (2) times a day. metoprolol succinate (TOPROL-XL) 100 mg tablet (Taking) Take 1 Tab by mouth daily. simvastatin (ZOCOR) 20 mg tablet (Taking) Take 1 Tab by mouth nightly.     Xarelto 20 mg tab tablet (Taking) Take 1 Tab by mouth daily (with breakfast). aspirin 81 mg chewable tablet (Taking) Take 1 Tab by mouth daily. bumetanide (BUMEX) 1 mg tablet (Taking) Take 1 Tab by mouth two (2) times a day. EDWARD  Diagnosed last year with [facility]  He was not using c-pap because the mask straps gave him a headache  He was asked to return the machine  He never followed back up with the sleep medicine MD          ROS:     ROS  As stated in HPI, otherwise all others negative. The problem list was updated as a part of today's visit. Patient Active Problem List   Diagnosis Code    Essential hypertension I10    Hx-TIA (transient ischemic attack) Z86.73    High cholesterol E78.00    Gout M10.9    Erectile disorder due to medical condition in male N52.1    Type 2 diabetes mellitus with hyperglycemia, with long-term current use of insulin (HCC) E11.65, Z79.4    Prostate cancer ([facility] C61    Pacemaker Z95.0    BMI 40.0-44.9, adult ([facility] Z68.41    Congestive heart failure (HCC) I50.9    Pulmonary emphysema (HCC) J43.9    Cardiomyopathy (HCC) I42.9    Chronic systolic CHF (congestive heart failure), NYHA class 2 ([facility]) I50.22    Noncompliance with treatment plan Z91.11    Paroxysmal atrial fibrillation (HCC) I48.0    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 SH:  Social History     Tobacco Use    Smoking status: Former Smoker     Types: Cigarettes     Last attempt to quit: 2014     Years since quittin.9    Smokeless tobacco: Never Used   Substance Use Topics    Alcohol use:  Yes     Alcohol/week: 1.0 standard drinks     Types: 1 Shots of liquor per week     Comment: occasionally    Drug use: No       Medications/Allergies:  Current Outpatient Medications on File Prior to Visit   Medication Sig Dispense Refill    HYDROcodone-acetaminophen (NORCO) 5-325 mg per tablet Take 1 Tab by mouth three (3) times daily as needed.  bacitracin (BACITRACIN) 500 unit/gram oint Apply  to affected area three (3) times daily.  losartan (COZAAR) 100 mg tablet Take 1 Tab by mouth daily. 90 Tab 0    spironolactone (ALDACTONE) 25 mg tablet Take 1 Tab by mouth two (2) times a day. 180 Tab 3    Bydureon BCise 2 mg/0.85 mL atIn       FreeStyle Pasquale 14 Day Sensor kit apply 1 SENSOR TO THE BACK OF UPPER ARM REMOVE AND REPLACE every . ..  (REFER TO PRESCRIPTION NOTES).  metoprolol succinate (TOPROL-XL) 100 mg tablet Take 1 Tab by mouth daily. 90 Tab 0    simvastatin (ZOCOR) 20 mg tablet Take 1 Tab by mouth nightly. 90 Tab 0    tamsulosin (FLOMAX) 0.4 mg capsule Take 1 Cap by mouth daily. 90 Cap 0    fluticasone propion-salmeteroL (Advair HFA) 115-21 mcg/actuation inhaler Take 2 Puffs by inhalation two (2) times a day. 1 Inhaler 5    albuterol (PROVENTIL HFA, VENTOLIN HFA, PROAIR HFA) 90 mcg/actuation inhaler Take 1-2 Puffs by inhalation every four (4) hours as needed for Wheezing. 1 Inhaler 5    Xarelto 20 mg tab tablet Take 1 Tab by mouth daily (with breakfast). 90 Tab 0    SITagliptin-metFORMIN (JANUMET)  mg per tablet Take 1 Tab by mouth two (2) times daily (with meals). 180 Tab 1    empagliflozin (JARDIANCE) 25 mg tablet Take 1 Tab by mouth daily. 90 Tab 3    aspirin 81 mg chewable tablet Take 1 Tab by mouth daily. 90 Tab 3    LANTUS SOLOSTAR U-100 INSULIN 100 unit/mL (3 mL) inpn 62 Units by SubCUTAneous route Before breakfast and dinner. Indications: type 2 diabetes mellitus 15 Adjustable Dose Pre-filled Pen Syringe 2    bumetanide (BUMEX) 1 mg tablet Take 1 Tab by mouth two (2) times a day. 180 Tab 3    OTHER,NON-FORMULARY, 1 mL by IntraCAVernosal route as needed. 5 Each 5    allopurinol (ZYLOPRIM) 100 mg tablet Take 100 mg by mouth daily.  APIDRA SOLOSTAR 100 unit/mL pen Indications: sliding scale       No current facility-administered medications on file prior to visit.          No Known Allergies    Objective:  Visit Vitals  /67   Pulse 95   Temp 98 °F (36.7 °C) (Oral)   Resp 16   Ht 5' 10\" (1.778 m)   Wt 286 lb (129.7 kg)   SpO2 96%   BMI 41.04 kg/m²    Body mass index is 41.04 kg/m². Physical assessment  Physical Exam  Vitals signs and nursing note reviewed. Eyes:      Conjunctiva/sclera: Conjunctivae normal.      Pupils: Pupils are equal, round, and reactive to light. Neck:      Musculoskeletal: Normal range of motion. Vascular: No JVD. Cardiovascular:      Rate and Rhythm: Normal rate and regular rhythm. Heart sounds: Normal heart sounds. No murmur. No friction rub. No gallop. Pulmonary:      Effort: Pulmonary effort is normal.      Breath sounds: Normal breath sounds. Musculoskeletal: Normal range of motion. Skin:     General: Skin is warm and dry. Neurological:      Mental Status: He is alert and oriented to person, place, and time.    Psychiatric:         Mood and Affect: Affect normal.         Cognition and Memory: Memory normal.         Judgment: Judgment normal.           Labwork and Ancillary Studies:    CBC w/Diff  Lab Results   Component Value Date/Time    WBC 5.8 11/03/2020 10:44 AM    HGB 14.9 11/03/2020 10:44 AM    PLATELET 173 84/86/1990 10:44 AM         Basic Metabolic Profile  Lab Results   Component Value Date/Time    Sodium 140 11/03/2020 10:44 AM    Potassium 4.2 11/03/2020 10:44 AM    Chloride 102 11/03/2020 10:44 AM    CO2 29 11/03/2020 10:44 AM    Anion gap 9.2 11/03/2020 10:44 AM    Glucose 98 11/03/2020 10:44 AM    BUN 18 11/03/2020 10:44 AM    Creatinine 1.1 11/03/2020 10:44 AM    BUN/Creatinine ratio 15 10/06/2020 03:05 PM    GFR est AA >60 10/06/2020 03:05 PM    GFR est non-AA 53 (L) 10/06/2020 03:05 PM    Calcium 9.6 11/03/2020 10:44 AM        Cholesterol  Lab Results   Component Value Date/Time    Cholesterol, total 153 10/06/2020 03:05 PM    HDL Cholesterol 71 (H) 10/06/2020 03:05 PM    LDL, calculated 55.2 10/06/2020 03:05 PM Triglyceride 134 10/06/2020 03:05 PM    CHOL/HDL Ratio 2.2 10/06/2020 03:05 PM       Health Maintenance:   Health Maintenance   Topic Date Due    Shingrix Vaccine Age 50> (1 of 2) 01/14/2003    Colorectal Cancer Screening Combo  01/14/2003    Pneumococcal 65+ years (1 of 1 - PPSV23) 02/03/2019    MICROALBUMIN Q1  09/19/2020    Medicare Yearly Exam  01/30/2021    Lipid Screen  10/06/2021    Foot Exam Q1  10/11/2021    A1C test (Diabetic or Prediabetic)  11/03/2021    GLAUCOMA SCREENING Q2Y  11/07/2021    Eye Exam Retinal or Dilated  11/07/2021    DTaP/Tdap/Td series (2 - Td) 03/31/2030    Hepatitis C Screening  Completed    Flu Vaccine  Completed       I have discussed the diagnosis with the patient and the intended plan as seen in the above orders. The patient has received an After-Visit Summary and questions were answered concerning future plans. An After Visit Summary was printed and given to the patient. All diagnosis have been discussed with the patient and all of the patient's questions have been answered. Follow-up and Dispositions    · Return in about 3 months (around 2/10/2021) for DM, HLD, HTN, 30 min, VV. Kaitlin Shultz, [facility]  [facility]   [address] [facility] [address]  [address]

## 2021-01-29 LAB — HBA1C MFR BLD HPLC: 7.4 %

## 2021-02-01 DIAGNOSIS — I50.9 CONGESTIVE HEART FAILURE, UNSPECIFIED HF CHRONICITY, UNSPECIFIED HEART FAILURE TYPE (HCC): ICD-10-CM

## 2021-02-02 RX ORDER — GUAIFENESIN 100 MG/5ML
81 LIQUID (ML) ORAL DAILY
Qty: 90 TAB | Refills: 3 | Status: SHIPPED | OUTPATIENT
Start: 2021-02-02 | End: 2021-02-10 | Stop reason: SDUPTHER

## 2021-02-10 ENCOUNTER — VIRTUAL VISIT (OUTPATIENT)
Dept: FAMILY MEDICINE CLINIC | Age: 66
End: 2021-02-10
Payer: MEDICARE

## 2021-02-10 DIAGNOSIS — I10 ESSENTIAL HYPERTENSION: ICD-10-CM

## 2021-02-10 DIAGNOSIS — E78.00 HIGH CHOLESTEROL: ICD-10-CM

## 2021-02-10 DIAGNOSIS — E11.65 TYPE 2 DIABETES MELLITUS WITH HYPERGLYCEMIA, WITH LONG-TERM CURRENT USE OF INSULIN (HCC): ICD-10-CM

## 2021-02-10 DIAGNOSIS — M1A.9XX0 CHRONIC GOUT INVOLVING TOE OF RIGHT FOOT WITHOUT TOPHUS, UNSPECIFIED CAUSE: ICD-10-CM

## 2021-02-10 DIAGNOSIS — Z79.4 TYPE 2 DIABETES MELLITUS WITH HYPERGLYCEMIA, WITH LONG-TERM CURRENT USE OF INSULIN (HCC): ICD-10-CM

## 2021-02-10 DIAGNOSIS — C61 PROSTATE CANCER (HCC): ICD-10-CM

## 2021-02-10 DIAGNOSIS — I42.9 CARDIOMYOPATHY, UNSPECIFIED TYPE (HCC): ICD-10-CM

## 2021-02-10 DIAGNOSIS — J43.8 OTHER EMPHYSEMA (HCC): ICD-10-CM

## 2021-02-10 DIAGNOSIS — Z00.00 MEDICARE ANNUAL WELLNESS VISIT, SUBSEQUENT: Primary | ICD-10-CM

## 2021-02-10 DIAGNOSIS — I50.22 CHRONIC SYSTOLIC CHF (CONGESTIVE HEART FAILURE), NYHA CLASS 2 (HCC): ICD-10-CM

## 2021-02-10 DIAGNOSIS — E55.9 VITAMIN D DEFICIENCY: ICD-10-CM

## 2021-02-10 DIAGNOSIS — I48.0 PAROXYSMAL ATRIAL FIBRILLATION (HCC): ICD-10-CM

## 2021-02-10 DIAGNOSIS — I50.9 CONGESTIVE HEART FAILURE, UNSPECIFIED HF CHRONICITY, UNSPECIFIED HEART FAILURE TYPE (HCC): ICD-10-CM

## 2021-02-10 PROCEDURE — G8417 CALC BMI ABV UP PARAM F/U: HCPCS | Performed by: NURSE PRACTITIONER

## 2021-02-10 PROCEDURE — 1101F PT FALLS ASSESS-DOCD LE1/YR: CPT | Performed by: NURSE PRACTITIONER

## 2021-02-10 PROCEDURE — G0439 PPPS, SUBSEQ VISIT: HCPCS | Performed by: NURSE PRACTITIONER

## 2021-02-10 PROCEDURE — 99214 OFFICE O/P EST MOD 30 MIN: CPT | Performed by: NURSE PRACTITIONER

## 2021-02-10 PROCEDURE — G8756 NO BP MEASURE DOC: HCPCS | Performed by: NURSE PRACTITIONER

## 2021-02-10 PROCEDURE — G8432 DEP SCR NOT DOC, RNG: HCPCS | Performed by: NURSE PRACTITIONER

## 2021-02-10 PROCEDURE — G8428 CUR MEDS NOT DOCUMENT: HCPCS | Performed by: NURSE PRACTITIONER

## 2021-02-10 PROCEDURE — 3017F COLORECTAL CA SCREEN DOC REV: CPT | Performed by: NURSE PRACTITIONER

## 2021-02-10 PROCEDURE — G8536 NO DOC ELDER MAL SCRN: HCPCS | Performed by: NURSE PRACTITIONER

## 2021-02-10 PROCEDURE — 3046F HEMOGLOBIN A1C LEVEL >9.0%: CPT | Performed by: NURSE PRACTITIONER

## 2021-02-10 PROCEDURE — 2022F DILAT RTA XM EVC RTNOPTHY: CPT | Performed by: NURSE PRACTITIONER

## 2021-02-10 PROCEDURE — G0463 HOSPITAL OUTPT CLINIC VISIT: HCPCS | Performed by: NURSE PRACTITIONER

## 2021-02-10 RX ORDER — RIVAROXABAN 20 MG/1
20 TABLET, FILM COATED ORAL
Qty: 90 TAB | Refills: 3 | Status: SHIPPED | OUTPATIENT
Start: 2021-02-10 | End: 2021-09-01 | Stop reason: SDUPTHER

## 2021-02-10 RX ORDER — CALCIUM CARBONATE/VITAMIN D3 500 MG-10
TABLET ORAL
COMMUNITY
End: 2021-02-10 | Stop reason: SDUPTHER

## 2021-02-10 RX ORDER — GUAIFENESIN 100 MG/5ML
81 LIQUID (ML) ORAL DAILY
Qty: 90 TAB | Refills: 3 | Status: SHIPPED | OUTPATIENT
Start: 2021-02-10 | End: 2021-08-05 | Stop reason: SDUPTHER

## 2021-02-10 RX ORDER — CALCIUM CARBONATE/VITAMIN D3 500 MG-10
1 TABLET ORAL DAILY
Qty: 90 TAB | Refills: 1 | Status: SHIPPED | OUTPATIENT
Start: 2021-02-10

## 2021-02-10 RX ORDER — LOSARTAN POTASSIUM 100 MG/1
100 TABLET ORAL DAILY
Qty: 90 TAB | Refills: 1 | Status: SHIPPED | OUTPATIENT
Start: 2021-02-10 | End: 2022-01-13 | Stop reason: SDUPTHER

## 2021-02-10 RX ORDER — ALLOPURINOL 100 MG/1
100 TABLET ORAL DAILY
Qty: 90 TAB | Refills: 1 | Status: SHIPPED | OUTPATIENT
Start: 2021-02-10 | End: 2022-01-13 | Stop reason: SDUPTHER

## 2021-02-10 RX ORDER — METFORMIN HYDROCHLORIDE 850 MG/1
850 TABLET ORAL 2 TIMES DAILY WITH MEALS
COMMUNITY
Start: 2021-01-29

## 2021-02-10 NOTE — ASSESSMENT & PLAN NOTE
Stable, based on history, physical exam and review of pertinent labs, studies and medications; meds reconciled; lifestyle modifications recommended.

## 2021-02-10 NOTE — ASSESSMENT & PLAN NOTE
SCRIBE #1 NOTE: I, Meme Zeng, am scribing for, and in the presence of, Nell Bishop MD. I have scribed the entire note.      History      Chief Complaint   Patient presents with    Emesis     c/o nausea/vomiting, with lower abd pain       Review of patient's allergies indicates:   Allergen Reactions    Pcn [penicillins]         HPI   HPI    9/7/2018, 7:10 PM   History obtained from the patient      History of Present Illness: Eva Phan is a 28 y.o. female patient who presents to the Emergency Department for emesis which onset gradually this afternoon. Symptoms are episodic and moderate in severity. No mitigating or exacerbating factors reported. Associated sxs include nausea, diarrhea, and generalized abd cramping. Patient denies any fever, chills, HA, dizziness, cough, congestion, hematochezia, dysuria, sick contacts, recent travel, and all other sxs at this time. Patient denies any recent abx use. Patient states she is currently on her menstrual cycle. No further complaints or concerns at this time.     Arrival mode: Personal vehicle     PCP: Froy Jenkins MD       Past Medical History:  Past Medical History:   Diagnosis Date    Seizures        Past Surgical History:  History reviewed. No pertinent surgical history.      Family History:  History reviewed. No pertinent family history.    Social History:  Social History     Tobacco Use    Smoking status: Current Every Day Smoker    Smokeless tobacco: Never Used   Substance and Sexual Activity    Alcohol use: Unknown    Drug use: Unknown    Sexual activity: Unknown       ROS   Review of Systems   Constitutional: Negative for chills and fever.   HENT: Negative for congestion and sore throat.    Respiratory: Negative for cough and shortness of breath.    Cardiovascular: Negative for chest pain.   Gastrointestinal: Positive for abdominal pain (cramping), diarrhea, nausea and vomiting. Negative for blood in stool.   Genitourinary: Negative  This condition is managed by Specialist. "for dysuria.   Musculoskeletal: Negative for back pain.   Skin: Negative for rash.   Neurological: Negative for dizziness, weakness and headaches.   Hematological: Does not bruise/bleed easily.   All other systems reviewed and are negative.      Physical Exam      Initial Vitals [09/07/18 1904]   BP Pulse Resp Temp SpO2   (!) 151/85 69 20 97.6 °F (36.4 °C) 100 %      MAP       --          Physical Exam  Nursing Notes and Vital Signs Reviewed.  Constitutional: Patient is in no acute distress. Well-developed and well-nourished.  Head: Atraumatic. Normocephalic.  Eyes: PERRL. EOM intact. Conjunctivae are not pale. No scleral icterus.  ENT: Mucous membranes are moist. Oropharynx is clear and symmetric.    Neck: Supple. Full ROM. No lymphadenopathy.  Cardiovascular: Regular rate. Regular rhythm. No murmurs, rubs, or gallops. Distal pulses are 2+ and symmetric.  Pulmonary/Chest: No respiratory distress. Clear to auscultation bilaterally. No wheezing or rales.  Abdominal: Soft and non-distended.  There is no tenderness.  No rebound, guarding, or rigidity. Good bowel sounds.  Genitourinary: No CVA tenderness  Musculoskeletal: Moves all extremities. No obvious deformities. No edema. No calf tenderness.  Skin: Warm and dry.  Neurological:  Alert, awake, and appropriate.  Normal speech.  No acute focal neurological deficits are appreciated.  Psychiatric: Normal affect. Good eye contact. Appropriate in content.    ED Course    Procedures  ED Vital Signs:  Vitals:    09/07/18 1904 09/07/18 2032   BP: (!) 151/85 (!) 143/84   Pulse: 69 (!) 56   Resp: 20 15   Temp: 97.6 °F (36.4 °C)    TempSrc: Oral    SpO2: 100% 100%   Weight: 77 kg (169 lb 12.1 oz)    Height: 5' 5" (1.651 m)        Abnormal Lab Results:  Labs Reviewed   CBC W/ AUTO DIFFERENTIAL - Abnormal; Notable for the following components:       Result Value    MCV 81 (*)     All other components within normal limits   COMPREHENSIVE METABOLIC PANEL - Abnormal; Notable for the " following components:    CO2 19 (*)     All other components within normal limits   URINALYSIS, REFLEX TO URINE CULTURE - Abnormal; Notable for the following components:    Appearance, UA Cloudy (*)     Protein, UA 1+ (*)     Occult Blood UA 3+ (*)     Leukocytes, UA Trace (*)     All other components within normal limits    Narrative:     Preferred Collection Type->Urine, Clean Catch   URINALYSIS MICROSCOPIC - Abnormal; Notable for the following components:    RBC,  (*)     All other components within normal limits    Narrative:     Preferred Collection Type->Urine, Clean Catch   LIPASE   PREGNANCY TEST, URINE RAPID        All Lab Results:  Results for orders placed or performed during the hospital encounter of 09/07/18   CBC W/ AUTO DIFFERENTIAL   Result Value Ref Range    WBC 8.06 3.90 - 12.70 K/uL    RBC 4.99 4.00 - 5.40 M/uL    Hemoglobin 14.3 12.0 - 16.0 g/dL    Hematocrit 40.5 37.0 - 48.5 %    MCV 81 (L) 82 - 98 fL    MCH 28.7 27.0 - 31.0 pg    MCHC 35.3 32.0 - 36.0 g/dL    RDW 12.3 11.5 - 14.5 %    Platelets 202 150 - 350 K/uL    MPV 10.6 9.2 - 12.9 fL    Gran # (ANC) 5.8 1.8 - 7.7 K/uL    Lymph # 1.8 1.0 - 4.8 K/uL    Mono # 0.4 0.3 - 1.0 K/uL    Eos # 0.1 0.0 - 0.5 K/uL    Baso # 0.02 0.00 - 0.20 K/uL    Gran% 71.8 38.0 - 73.0 %    Lymph% 21.8 18.0 - 48.0 %    Mono% 5.0 4.0 - 15.0 %    Eosinophil% 1.0 0.0 - 8.0 %    Basophil% 0.2 0.0 - 1.9 %    Differential Method Automated    Comp. Metabolic Panel   Result Value Ref Range    Sodium 139 136 - 145 mmol/L    Potassium 3.6 3.5 - 5.1 mmol/L    Chloride 107 95 - 110 mmol/L    CO2 19 (L) 23 - 29 mmol/L    Glucose 106 70 - 110 mg/dL    BUN, Bld 12 6 - 20 mg/dL    Creatinine 0.9 0.5 - 1.4 mg/dL    Calcium 9.4 8.7 - 10.5 mg/dL    Total Protein 7.0 6.0 - 8.4 g/dL    Albumin 4.6 3.5 - 5.2 g/dL    Total Bilirubin 0.3 0.1 - 1.0 mg/dL    Alkaline Phosphatase 67 55 - 135 U/L    AST 12 10 - 40 U/L    ALT 11 10 - 44 U/L    Anion Gap 13 8 - 16 mmol/L    eGFR if  African American >60.0 >60 mL/min/1.73 m^2    eGFR if non African American >60.0 >60 mL/min/1.73 m^2   Lipase   Result Value Ref Range    Lipase 20 4 - 60 U/L   Urinalysis, Reflex to Urine Culture Urine, Clean Catch   Result Value Ref Range    Specimen UA Urine, Clean Catch     Color, UA Yellow Yellow, Straw, Jackelyn    Appearance, UA Cloudy (A) Clear    pH, UA 7.0 5.0 - 8.0    Specific Gravity, UA 1.025 1.005 - 1.030    Protein, UA 1+ (A) Negative    Glucose, UA Negative Negative    Ketones, UA Negative Negative    Bilirubin (UA) Negative Negative    Occult Blood UA 3+ (A) Negative    Nitrite, UA Negative Negative    Urobilinogen, UA Negative <2.0 EU/dL    Leukocytes, UA Trace (A) Negative   Pregnancy, urine rapid   Result Value Ref Range    Preg Test, Ur Negative    Urinalysis Microscopic   Result Value Ref Range    RBC,  (H) 0 - 4 /hpf    WBC, UA 4 0 - 5 /hpf    Bacteria, UA Occasional None-Occ /hpf    Squam Epithel, UA 4 /hpf    Hyaline Casts, UA 0 0-1/lpf /lpf    Microscopic Comment SEE COMMENT        Imaging Results:  Imaging Results    None                 The Emergency Provider reviewed the vital signs and test results, which are outlined above.    ED Discussion     8:06 PM: Re-evaluated pt. Pt is resting comfortably and is in no acute distress.  D/w pt all pertinent results. D/w pt any concerns expressed at this time. Answered all questions. Pt expresses understanding at this time.    8:43 PM: Reassessed pt at this time.  Pt states her condition has improved at this time. No episodes of emesis while in the ED. Patient able to tolerate PO. Discussed with pt all pertinent ED information and results. Discussed pt dx and plan of tx. Gave pt all f/u and return to the ED instructions. All questions and concerns were addressed at this time. Pt expresses understanding of information and instructions, and is comfortable with plan to discharge. Pt is stable for discharge.    I discussed with patient and/or  family/caretaker that evaluation in the ED does not suggest any emergent or life threatening medical conditions requiring immediate intervention beyond what was provided in the ED, and I believe patient is safe for discharge.  Regardless, an unremarkable evaluation in the ED does not preclude the development or presence of a serious of life threatening condition. As such, patient was instructed to return immediately for any worsening or change in current symptoms.      ED Medication(s):  Medications   sodium chloride 0.9% bolus 1,000 mL (0 mLs Intravenous Stopped 9/7/18 2058)   ondansetron injection 4 mg (4 mg Intravenous Given 9/7/18 1933)       This SmartLink is deprecated. Use [employer] instead to display the medication list for a patient.    Follow-up Information     [facility] In 3 days.    Contact information:  [address]  [place] [address]  [phone]             [facility].    Specialty:  Emergency Medicine  Why:  As needed, If symptoms worsen  Contact information:  [address] [address] [address] [phone]                   Medical Decision Making    Medical Decision Making:   Clinical Tests:   Lab Tests: Ordered and Reviewed           Scribe Attestation:   Scribe #1: I performed the above scribed service and the documentation accurately describes the services I performed. I attest to the accuracy of the note.    Attending:   Physician Attestation Statement for Scribe #1: I, Nell Bishop MD, personally performed the services described in this documentation, as scribed by Meme Zeng, in my presence, and it is both accurate and complete.          Clinical Impression       ICD-10-CM ICD-9-CM   1. Nausea vomiting and diarrhea R11.2 787.91    R19.7 787.01       Disposition:   Disposition: Discharged  Condition: Stable         Nell Bishop MD  09/08/18 0519

## 2021-02-10 NOTE — PATIENT INSTRUCTIONS
Medicare Wellness Visit, Male The best way to live healthy is to have a lifestyle where you eat a well-balanced diet, exercise regularly, limit alcohol use, and quit all forms of tobacco/nicotine, if applicable. Regular preventive services are another way to keep healthy. Preventive services (vaccines, screening tests, monitoring & exams) can help personalize your care plan, which helps you manage your own care. Screening tests can find health problems at the earliest stages, when they are easiest to treat. [facility] follows the current, evidence-based guidelines published by the [facility] ([facility]) when recommending preventive services for our patients. Because we follow these guidelines, sometimes recommendations change over time as research supports it. (For example, a prostate screening blood test is no longer routinely recommended for men with no symptoms). Of course, you and your doctor may decide to screen more often for some diseases, based on your risk and co-morbidities (chronic disease you are already diagnosed with). Preventive services for you include: - Medicare offers their members a free annual wellness visit, which is time for you and your primary care provider to discuss and plan for your preventive service needs. Take advantage of this benefit every year! 
-All adults over age 72 should receive the recommended pneumonia vaccines. Current USPSTF guidelines recommend a series of two vaccines for the best pneumonia protection.  
-All adults should have a flu vaccine yearly and tetanus vaccine every 10 years. 
-All adults age 48 and older should receive the shingles vaccines (series of two vaccines). -All adults age 38-68 who are overweight should have a diabetes screening test once every three years. -Other screening tests & preventive services for persons with diabetes include: an eye exam to screen for diabetic retinopathy, a kidney function test, a foot exam, and stricter control over your cholesterol.  
-Cardiovascular screening for adults with routine risk involves an electrocardiogram (ECG) at intervals determined by the provider.  
-Colorectal cancer screening should be done for adults age 54-65 with no increased risk factors for colorectal cancer. There are a number of acceptable methods of screening for this type of cancer. Each test has its own benefits and drawbacks. Discuss with your provider what is most appropriate for you during your annual wellness visit. The different tests include: colonoscopy (considered the best screening method), a fecal occult blood test, a fecal DNA test, and sigmoidoscopy. 
-All adults born between [facility] should be screened once for Hepatitis C. 
-An Abdominal Aortic Aneurysm (AAA) Screening is recommended for men age 73-68 who has ever smoked in their lifetime. Here is a list of your current Health Maintenance items (your personalized list of preventive services) with a due date: 
Health Maintenance Due Topic Date Due  
 COVID-19 Vaccine (1 of 2) 01/14/1969  Shingles Vaccine (1 of 2) 01/14/2003  Pneumococcal Vaccine (1 of 1 - PPSV23) 02/03/2019  Albumin Urine Test  09/19/2020

## 2021-02-10 NOTE — PROGRESS NOTES
[address] [place] [address]               [phone]      Giorgio Bhakta is a 74 y.o. male who was seen by synchronous (real-time) audio-video technology on 2/10/2021. Consent: Giorgio Bhakta, who was seen by synchronous (real-time) audio-video technology, and/or his healthcare decision maker, is aware that this patient-initiated, Telehealth encounter on 2/10/2021 is a billable service, with coverage as determined by his insurance carrier. He is aware that he may receive a bill and has provided verbal consent to proceed: Yes. Assessment & Plan:   Diagnoses and all orders for this visit:    1. Medicare annual wellness visit, subsequent   Completed today to include EtOH and depression screening  No new needs identified  Healthcare decision maker updated     2. Essential hypertension  -     losartan (COZAAR) 100 mg tablet; Take 1 Tab by mouth daily. Endorses medication compliance  States he had his blood pressure checked at the endocrinologist office and told it was \"good\", these records are not available for me to review  Refill provided  We will have him come into the office for his next visit    3. Type 2 diabetes mellitus with hyperglycemia, with long-term current use of insulin (HCC)  Endorses medication compliance  States home glucoses are running between 120 and 125  Denies any signs and symptoms of hyper or hypoglycemia  We will get labs at his next visit    4. High cholesterol  Endorses medication compliance  Denies any signs and symptoms of myalgia  We will check labs with next visit    5. Vitamin D deficiency  -     Calcium-Cholecalciferol, D3, 500 mg(1,250mg) -400 unit tab; Take 1 Tab by mouth daily. Refill provided    6. 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 Refill provided    7. Prostate cancer [facility])  Assessment & Plan:   This condition is managed by Specialist.      8. Congestive heart failure, unspecified HF chronicity, unspecified heart failure type [facility])  Assessment & Plan: This condition is managed by Specialist.    Orders:  -     aspirin 81 mg chewable tablet; Take 1 Tab by mouth daily. 9. Other [facility]yse[facility] ([facility]  Assessment & Plan:  Stable, based on history, physical exam and review of pertinent labs, studies and medications; meds reconciled; continue current treatment plan. 10. Cardiomyopathy, unspecified type [facility])  Assessment & Plan: This condition is managed by Specialist.      11. Chronic systolic CHF (congestive heart failure), NYHA class 2 ([facility]  Assessment & Plan: This condition is managed by Specialist.      12. Paroxysmal atrial fibrillation [facility])  Assessment & Plan: This condition is managed by Specialist.    Orders:  -     Xarelto 20 mg tab tablet; Take 1 Tab by mouth daily (with breakfast). Refill provided    13. Body mass index (BMI)40.0-44.9, adult ([facility])  Discussed the patient's BMI. The BMI follow up plan is as follows:     dietary management education, guidance, and counseling  encourage exercise  monitor weight  prescribed dietary intake  Discussed portion control  Eat fresh or frozen fruits and vegetables, stay away from canned  Limit eating out and fast food  Practice meal planning    Follow-up and Dispositions    · Return in about 3 months (around 5/10/2021) for DM, HLD, HTN, 15 min, office only.              712  Subjective:   Darius Morales is a 74 y.o. male who was seen for   Annual Wellness Visit (), Diabetes, and Hypertension    DMII- Managed by endocrinology: Kimberli linton   Patient reports medication compliance Daily  Diabetic diet compliance most of the time  Patient monitors blood sugars regularly QID   Reports am fasting sugars range 120-125   Denies hypoglycemic episodes no, sometimes in the morning, 52 yesterday, had symptoms, advised to discuss with dr. James Cleveland  Denies polyuria, polydipsia, paraesthesia, vision changes? yes     Diabetic Foot and Eye Exam HM Status   Topic Date Due    Diabetic Foot Care  10/11/2021    Eye Exam  11/07/2021     Hemoglobin A1c   Date Value Ref Range Status   11/03/2020 8.4 (H) 4.8 - 5.6 % Final   ]  Creatinine, urine   Date Value Ref Range Status   09/19/2019 189.00 (H) 30 - 125 mg/dL Final     Microalbumin/Creat ratio (mg/g creat)   Date Value Ref Range Status   09/19/2019 16 0 - 30 mg/g Final     Key Antihyperglycemic Medications             metFORMIN (GLUCOPHAGE) 850 mg tablet (Taking)     Bydureon BCise 2 mg/0.85 mL atIn (Taking) by SubCUTAneous route every seven (7) days. empagliflozin (JARDIANCE) 25 mg tablet (Taking) Take 1 Tab by mouth daily. LANTUS SOLOSTAR U-100 INSULIN 100 unit/mL (3 mL) inpn (Taking) 62 Units by SubCUTAneous route Before breakfast and dinner. Indications: type 2 diabetes mellitus    APIDRA SOLOSTAR 100 unit/mL pen (Taking) by SubCUTAneous route Before breakfast, lunch, and dinner. Indications: sliding scale        Hypertension:   Patient reports taking medications as instructed. yes   Medication side effects noted. no  Headache upon wakening. no   Home BP monitoring in range of states it was checked at endocrinologist office and told it was \"good\", he does 's systolic. Do you experience chest pain/pressure or SOB with exertion? no  Maintain a low salt diet? yes  Key CAD CHF Meds             losartan (COZAAR) 100 mg tablet (Taking) Take 1 Tab by mouth daily. Xarelto 20 mg tab tablet (Taking) Take 1 Tab by mouth daily (with breakfast). aspirin 81 mg chewable tablet (Taking) Take 1 Tab by mouth daily. spironolactone (ALDACTONE) 25 mg tablet (Taking) Take 1 Tab by mouth two (2) times a day. metoprolol succinate (TOPROL-XL) 100 mg tablet (Taking) Take 1 Tab by mouth daily. simvastatin (ZOCOR) 20 mg tablet (Taking) Take 1 Tab by mouth nightly. bumetanide (BUMEX) 1 mg tablet (Taking) Take 1 Tab by mouth two (2) times a day. HLD:  Has been compliant with meds  all of the time  Compliant with low-fat diet. most of the time    Denies myalgias or other side effects. yes  Cholesterol, total   Date Value Ref Range Status   10/06/2020 153 <200 MG/DL Final     Triglyceride   Date Value Ref Range Status   10/06/2020 134 <150 MG/DL Final     Comment:     The drugs N-acetylcysteine (NAC) and  Metamiszole have been found to cause falsely  low results in this chemical assay. Please  be sure to submit blood samples obtained  BEFORE administration of either of these  drugs to assure correct results. HDL Cholesterol   Date Value Ref Range Status   10/06/2020 71 (H) 40 - 60 MG/DL Final     LDL, calculated   Date Value Ref Range Status   10/06/2020 55.2 0 - 100 MG/DL Final   ]  Key Antihyperlipidemia Meds             simvastatin (ZOCOR) 20 mg tablet (Taking) Take 1 Tab by mouth nightly. Prior to Admission medications    Medication Sig Start Date End Date Taking? Authorizing Provider   metFORMIN (GLUCOPHAGE) 850 mg tablet  1/29/21  Yes Provider, Historical   allopurinoL (ZYLOPRIM) 100 mg tablet Take 1 Tab by mouth daily. 2/10/21  Yes Jazzy Valentino NP   losartan (COZAAR) 100 mg tablet Take 1 Tab by mouth daily. 2/10/21  Yes Jazzy Valentino NP   Xarelto 20 mg tab tablet Take 1 Tab by mouth daily (with breakfast). 2/10/21  Yes Jazzy Valentino NP   Calcium-Cholecalciferol, D3, 500 mg(1,250mg) -400 unit tab Take 1 Tab by mouth daily. 2/10/21  Yes Jazzy Valentino NP   aspirin 81 mg chewable tablet Take 1 Tab by mouth daily. 2/10/21  Yes Jazzy Valentino NP   ipratropium-albuteroL (Combivent Respimat)  mcg/actuation inhaler Take 1 Puff by inhalation every six (6) hours as needed. Yes Provider, Historical   docusate sodium (COLACE) 100 mg capsule Take 100 mg by mouth as needed for Constipation.    Yes Provider, Historical   OTHER Vitamin d3 400 iu cap  daily   Yes Provider, Historical   tiotropium (SPIRIVA) 18 mcg inhalation capsule Take 1 Cap by inhalation daily. 11/10/20  Yes Miguel Skinner NP   spironolactone (ALDACTONE) 25 mg tablet Take 1 Tab by mouth two (2) times a day. 10/6/20  Yes Miguel Skinner NP   Bydureon BCise 2 mg/0.85 mL atIn by SubCUTAneous route every seven (7) days. 9/29/20  Yes Provider, Historical   FreeStyle Pasquale 14 Day Sensor kit apply 1 SENSOR TO THE BACK OF UPPER ARM REMOVE AND REPLACE every . ..  (REFER TO PRESCRIPTION NOTES). 7/23/20  Yes Provider, Historical   metoprolol succinate (TOPROL-XL) 100 mg tablet Take 1 Tab by mouth daily. 10/6/20  Yes Miguel Skinner NP   simvastatin (ZOCOR) 20 mg tablet Take 1 Tab by mouth nightly. 10/6/20  Yes Miguel Skinner NP   tamsulosin (FLOMAX) 0.4 mg capsule Take 1 Cap by mouth daily. 10/6/20  Yes Miguel Skinner NP   fluticasone propion-salmeteroL (Advair HFA) 115-21 mcg/actuation inhaler Take 2 Puffs by inhalation two (2) times a day. 10/6/20  Yes Miguel Skinner NP   empagliflozin (JARDIANCE) 25 mg tablet Take 1 Tab by mouth daily. 1/30/20  Yes Miguel Skinner NP   LANTUS SOLOSTAR U-100 INSULIN 100 unit/mL (3 mL) inpn 62 Units by SubCUTAneous route Before breakfast and dinner. Indications: type 2 diabetes mellitus  Patient taking differently: 60 Units by SubCUTAneous route Before breakfast and dinner. Indications: type 2 diabetes mellitus 10/30/19  Yes Miguel Skinner NP   bumetanide (BUMEX) 1 mg tablet Take 1 Tab by mouth two (2) times a day. 5/7/19  Yes Vladislav Gary MD   APIDRA SOLOSTAR 100 unit/mL pen by SubCUTAneous route Before breakfast, lunch, and dinner.  Indications: sliding scale 9/14/17  Yes Provider, Historical     No Known Allergies    Patient Active Problem List   Diagnosis Code    Essential hypertension I10    Hx-TIA (transient ischemic attack) Z86.73    High cholesterol E78.00    Gout M10.9    Erectile disorder due to medical condition in male N52.1    Type 2 diabetes mellitus with hyperglycemia, with long-term current use of insulin (HCC) E11.65, Z79.4    Pro[facility] ([facility] C61    Pacemaker Z95.0    BMI 40.0-44.9, adult ([facility] Z68.41    Congestive heart failure (HCC) I50.9    Pulmonary emphysema (HCC) J43.9    Cardiomyopathy (HCC) I42.9    Chronic systolic CHF (congestive heart failure), NYHA class 2 (HCC) I50.22    Noncompliance with treatment plan Z91.11    Paroxysmal atrial fibrillation (HCC) I48.0    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 Has 1 screw    HX PACEMAKER      pacemaker/defibrillator - Medtronic    HX PACEMAKER PLACEMENT      HX UROLOGICAL  2018    PNBx. TRUS Vol 54 grams. Neha 3+4 in RLM. Neha 3+3 in  cores. Dr. Mojica Bachelor.  HX UROLOGICAL      penis implant     Family History   Problem Relation Age of Onset    Stroke Mother     Heart Attack Father      Social History     Tobacco Use    Smoking status: Former Smoker     Packs/day: 0.50     Years: 20.00     Pack years: 10.00     Types: Cigarettes     Quit date:      Years since quittin.1    Smokeless tobacco: Never Used   Substance Use Topics    Alcohol use: Yes     Alcohol/week: 1.0 standard drinks     Types: 1 Shots of liquor per week     Comment: occasionally       ROS   As stated in HPI, otherwise all others negative. Objective: There were no vitals taken for this visit.    General: alert, cooperative, no distress   Mental  status: normal mood, behavior, speech, dress, motor activity, and thought processes, able to follow commands   HENT: NCAT   Neck: no visualized mass   Resp: no respiratory distress   Neuro: no gross deficits   Skin: no discoloration or lesions of concern on visible areas   Psychiatric: normal affect, consistent with stated mood, no evidence of hallucinations     Additional exam findings: We discussed the expected course, resolution and complications of the diagnosis(es) in detail. Medication risks, benefits, costs, interactions, and alternatives were discussed as indicated. I advised him to contact the office if his condition worsens, changes or fails to improve as anticipated. He expressed understanding with the diagnosis(es) and plan. Isiah Davila is a 74 y.o. male who was evaluated by a video visit encounter for concerns as above. Patient identification was verified prior to start of the visit. A caregiver was present when appropriate. Due to this being a TeleHealth encounter (During IEKJC-13 public health emergency), evaluation of the following organ systems was limited: Vitals/Constitutional/EENT/Resp/CV/GI//MS/Neuro/Skin/Heme-Lymph-Imm. Pursuant to the emergency declaration under the [facility], 1135 waiver authority and the [employer] Act, this Virtual  Visit was conducted, with patient's (and/or legal guardian's) consent, to reduce the patient's risk of exposure to COVID-19 and provide necessary medical care. Services were provided through a video synchronous discussion virtually to substitute for in-person clinic visit. Patient and provider were located at their individual homes. An After Visit Summary was printed and given to the patient. All diagnosis have been discussed with the patient and all of the patient's questions have been answered. Follow-up and Dispositions    · Return in about 3 months (around 5/10/2021) for DM, HLD, HTN, 15 min, office only.          Serena Knox, [facility]  [address] [facility] [address] [place] [address] Ros Corbett    This is the Subsequent Medicare Annual Wellness Exam, performed 12 months or more after the Initial AWV or the last Subsequent AWV    I have reviewed the patient's medical history in detail and updated the computerized patient record. Depression Risk Factor Screening:     3 most recent PHQ Screens 2/10/2021   Little interest or pleasure in doing things Not at all   Feeling down, depressed, irritable, or hopeless Not at all   Total Score PHQ 2 0       Alcohol Risk Screen    Do you average more than 1 drink per night or more than 7 drinks a week: Yes    In the past three months have you have had more than 4 drinks containing alcohol on one occasion: Yes        Functional Ability and Level of Safety:    Hearing: Hearing is good. Activities of Daily Living: The home contains: no safety equipment. Patient does total self care      Ambulation: with no difficulty     Fall Risk:  Fall Risk Assessment, last 12 mths 2/10/2021   Able to walk? Yes   Fall in past 12 months? 0   Do you feel unsteady? 0   Are you worried about falling 0      Abuse Screen:  Patient is not abused       Cognitive Screening    Has your family/caregiver stated any concerns about your memory: yes - cannot remember people from 10-15 years ago, does not want further evaluation at this time    Cognitive Screening: Normal - three word recall: 3/3    Assessment/Plan   Education and counseling provided:  Are appropriate based on today's review and evaluation    Diagnoses and all orders for this visit:    1. Medicare annual wellness visit, subsequent    2. Essential hypertension  -     losartan (COZAAR) 100 mg tablet; Take 1 Tab by mouth daily. 3. Type 2 diabetes mellitus with hyperglycemia, with long-term current use of insulin ([address]    4. High cholesterol    5. Vitamin D deficiency  -     Calcium-Cholecalciferol, D3, 500 mg(1,250mg) -400 unit tab; Take 1 Tab by mouth daily.     6. 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 7. Prostate cancer [facility])  Assessment & Plan: This condition is managed by Specialist.      8. Congestive heart failure, unspecified HF chronicity, unspecified heart failure type [facility])  Assessment & Plan: This condition is managed by Specialist.    Orders:  -     aspirin 81 mg chewable tablet; Take 1 Tab by mouth daily. 9. Other emphysema ([facility]  Assessment & Plan:  Stable, based on history, physical exam and review of pertinent labs, studies and medications; meds reconciled; continue current treatment plan. 10. Cardiomyopathy, unspecified type [facility])  Assessment & Plan: This condition is managed by Specialist.      11. Chronic systolic CHF (congestive heart failure), NYHA class 2 ([facility]  Assessment & Plan: This condition is managed by Specialist.      12. Paroxysmal atrial fibrillation [facility])  Assessment & Plan: This condition is managed by Specialist.    Orders:  -     Xarelto 20 mg tab tablet; Take 1 Tab by mouth daily (with breakfast).     13. Body mass index (BMI)40.0-44.9, adult [facility])      Health Maintenance Due     Health Maintenance Due   Topic Date Due    COVID-19 Vaccine (1 of 2) 01/14/1971    Shingrix Vaccine Age 50> (1 of 2) 01/14/2003    AAA Screening 73-69 YO Male Smoking Patients  01/14/2018    Pneumococcal 65+ years (1 of 1 - PPSV23) 02/03/2019    MICROALBUMIN Q1  09/19/2020       Patient Care Team   Patient Care Team:  Jenette Boxer, NP as PCP - General (Nurse Practitioner)  Jenette Boxer, NP as PCP - Nadia Carrillo Provider  Elodia Lockett, RN as Ambulatory Care Manager  Vito Moise MD as Physician (Cardiology)    History     Patient Active Problem List   Diagnosis Code    Essential hypertension I10    Hx-TIA (transient ischemic attack) Z86.73    High cholesterol E78.00    Gout M10.9    Erectile disorder due to medical condition in male N52.1    Type 2 diabetes mellitus with hyperglycemia, with long-term current use of insulin (HCC) E11.65, Z79.4    Prostate cancer ([facility] C61    Pacemaker Z95.0    BMI 40.0-44.9, adult (HCC) Z68.41    Congestive heart failure (HCC) I50.9    Pulmonary emphysema (HCC) J43.9    Cardiomyopathy (HCC) I42.9    Chronic systolic CHF (congestive heart failure), NYHA class 2 (HCC) I50.22    Noncompliance with treatment plan Z91.11    Paroxysmal atrial fibrillation (HCC) I48.0    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 pre-bx PSA 4.65 ng/mL. Past Surgical History:   Procedure Laterality Date    COLONOSCOPY N/A 12/17/2020    COLONOSCOPY WITH COLD SNARED POLYPECTOMY performed by Roberto Parks MD at [address] [place] [address] HX CIRCUMCISION  2014    HX COLONOSCOPY      HX ORTHOPAEDIC      right ankle surgery with pins    HX OTHER SURGICAL Right 2011    fractured ankle. Has 1 screw    HX PACEMAKER      pacemaker/defibrillator - Medtronic    HX PACEMAKER PLACEMENT  2012    HX UROLOGICAL  02/08/2018    PNBx. TRUS Vol 54 grams. Neha 3+4 in RLM. Pickford 3+3 in 5/12 cores. Dr. Black Forward.  HX UROLOGICAL      penis implant     Current Outpatient Medications   Medication Sig Dispense Refill    metFORMIN (GLUCOPHAGE) 850 mg tablet       allopurinoL (ZYLOPRIM) 100 mg tablet Take 1 Tab by mouth daily. 90 Tab 1    losartan (COZAAR) 100 mg tablet Take 1 Tab by mouth daily. 90 Tab 1    Xarelto 20 mg tab tablet Take 1 Tab by mouth daily (with breakfast).  90 Tab 3    Calcium-Cholecalciferol, D3, 500 mg(1,250mg) -400 unit tab Take 1 Tab by mouth daily. 90 Tab 1    aspirin 81 mg chewable tablet Take 1 Tab by mouth daily. 90 Tab 3    ipratropium-albuteroL (Combivent Respimat)  mcg/actuation inhaler Take 1 Puff by inhalation every six (6) hours as needed.  docusate sodium (COLACE) 100 mg capsule Take 100 mg by mouth as needed for Constipation.  OTHER Vitamin d3 400 iu cap  daily      tiotropium (SPIRIVA) 18 mcg inhalation capsule Take 1 Cap by inhalation daily. 30 Cap 2    spironolactone (ALDACTONE) 25 mg tablet Take 1 Tab by mouth two (2) times a day. 180 Tab 3    Bydureon BCise 2 mg/0.85 mL atIn by SubCUTAneous route every seven (7) days.  FreeStyle Pasquale 14 Day Sensor kit apply 1 SENSOR TO THE BACK OF UPPER ARM REMOVE AND REPLACE every . ..  (REFER TO PRESCRIPTION NOTES).  metoprolol succinate (TOPROL-XL) 100 mg tablet Take 1 Tab by mouth daily. 90 Tab 0    simvastatin (ZOCOR) 20 mg tablet Take 1 Tab by mouth nightly. 90 Tab 0    tamsulosin (FLOMAX) 0.4 mg capsule Take 1 Cap by mouth daily. 90 Cap 0    fluticasone propion-salmeteroL (Advair HFA) 115-21 mcg/actuation inhaler Take 2 Puffs by inhalation two (2) times a day. 1 Inhaler 5    empagliflozin (JARDIANCE) 25 mg tablet Take 1 Tab by mouth daily. 90 Tab 3    LANTUS SOLOSTAR U-100 INSULIN 100 unit/mL (3 mL) inpn 62 Units by SubCUTAneous route Before breakfast and dinner. Indications: type 2 diabetes mellitus (Patient taking differently: 60 Units by SubCUTAneous route Before breakfast and dinner. Indications: type 2 diabetes mellitus) 15 Adjustable Dose Pre-filled Pen Syringe 2    bumetanide (BUMEX) 1 mg tablet Take 1 Tab by mouth two (2) times a day. 180 Tab 3    APIDRA SOLOSTAR 100 unit/mL pen by SubCUTAneous route Before breakfast, lunch, and dinner.  Indications: sliding scale       No Known Allergies    Family History   Problem Relation Age of Onset    Stroke Mother     Heart Attack Father      Social History     Tobacco Use    Smoking status: Former Smoker     Packs/day: 0.50     Years: 20.00     Pack years: 10.00     Types: Cigarettes     Quit date:      Years since quittin.1    Smokeless tobacco: Never Used   Substance Use Topics    Alcohol use: Yes     Alcohol/week: 1.0 standard drinks     Types: 1 Shots of liquor per week     Comment: occasionally       Gui Wallace, who was evaluated through a synchronous (real-time) audio-video encounter, and/or his healthcare decision maker, is aware that it is a billable service, with coverage as determined by his insurance carrier. He provided verbal consent to proceed: Yes, and patient identification was verified. It was conducted pursuant to the emergency declaration under the [address] [place] [address] authority and the [employer] Act. A caregiver was present when appropriate. Ability to conduct physical exam was limited. I was in the office. The patient was at home.     Sil Wing NP

## 2021-02-10 NOTE — PROGRESS NOTES
Chief Complaint   Patient presents with   [facility] Annual Wellness Visit         Diabetes    Hypertension       1. Have you been to the ER, urgent care clinic since your last visit? Hospitalized since your last visit? No    2. Have you seen or consulted any other health care providers outside of the [address] [place] [address] since your last visit? Include any pap smears or colon screening. Gastroenterology, Has not been to [facility] med yet. Chief Complaint   Patient presents with   [facility] Annual Wellness Visit         Diabetes    Hypertension       3 most recent PHQ Screens 2/10/2021   Little interest or pleasure in doing things Not at all   Feeling down, depressed, irritable, or hopeless Not at all   Total Score PHQ 2 0     Fall Risk Assessment, last 12 mths 2/10/2021   Able to walk? Yes   Fall in past 12 months? 0   Do you feel unsteady? 0   Are you worried about falling 0               Learning Assessment 1/30/2020   PRIMARY LEARNER Patient   HIGHEST LEVEL OF EDUCATION - PRIMARY LEARNER  GRADUATED HIGH SCHOOL OR GED   BARRIERS PRIMARY LEARNER NONE   CO-LEARNER CAREGIVER No   PRIMARY LANGUAGE ENGLISH   LEARNER PREFERENCE PRIMARY LISTENING   ANSWERED BY Octavio Beyer   RELATIONSHIP SELF     There were no vitals taken for this visit.

## 2021-03-02 DIAGNOSIS — C61 PROSTATE CANCER (HCC): ICD-10-CM

## 2021-03-03 RX ORDER — TAMSULOSIN HYDROCHLORIDE 0.4 MG/1
0.4 CAPSULE ORAL DAILY
Qty: 90 CAP | Refills: 0 | Status: SHIPPED | OUTPATIENT
Start: 2021-03-03 | End: 2021-08-11 | Stop reason: SDUPTHER

## 2021-03-15 DIAGNOSIS — I10 ESSENTIAL HYPERTENSION: ICD-10-CM

## 2021-03-15 RX ORDER — METOPROLOL SUCCINATE 100 MG/1
100 TABLET, EXTENDED RELEASE ORAL DAILY
Qty: 90 TAB | Refills: 0 | Status: SHIPPED | OUTPATIENT
Start: 2021-03-15 | End: 2021-09-01 | Stop reason: SDUPTHER

## 2021-03-25 ENCOUNTER — DOCUMENTATION ONLY (OUTPATIENT)
Dept: FAMILY MEDICINE CLINIC | Age: 66
End: 2021-03-25

## 2021-06-09 DIAGNOSIS — E11.65 TYPE 2 DIABETES MELLITUS WITH HYPERGLYCEMIA, WITH LONG-TERM CURRENT USE OF INSULIN (HCC): ICD-10-CM

## 2021-06-09 DIAGNOSIS — Z79.4 TYPE 2 DIABETES MELLITUS WITH HYPERGLYCEMIA, WITH LONG-TERM CURRENT USE OF INSULIN (HCC): ICD-10-CM

## 2021-06-30 NOTE — TELEPHONE ENCOUNTER
JUAQUIN Salamanca out of the office until 2-8-21.     Next appt with JUAQUIN Salamanca- 5-10-21
left

## 2021-07-14 DIAGNOSIS — J44.9 CHRONIC OBSTRUCTIVE PULMONARY DISEASE, UNSPECIFIED COPD TYPE (HCC): ICD-10-CM

## 2021-08-05 DIAGNOSIS — I50.9 CONGESTIVE HEART FAILURE, UNSPECIFIED HF CHRONICITY, UNSPECIFIED HEART FAILURE TYPE (HCC): ICD-10-CM

## 2021-08-09 RX ORDER — GUAIFENESIN 100 MG/5ML
81 LIQUID (ML) ORAL DAILY
Qty: 90 TABLET | Refills: 3 | Status: SHIPPED | OUTPATIENT
Start: 2021-08-09

## 2021-08-10 ENCOUNTER — TELEPHONE (OUTPATIENT)
Dept: FAMILY MEDICINE CLINIC | Age: 66
End: 2021-08-10

## 2021-08-10 ENCOUNTER — VIRTUAL VISIT (OUTPATIENT)
Dept: FAMILY MEDICINE CLINIC | Age: 66
End: 2021-08-10

## 2021-08-10 RX ORDER — GUAIFENESIN 100 MG/5ML
81 LIQUID (ML) ORAL DAILY
Qty: 90 TABLET | Refills: 3 | Status: CANCELLED | OUTPATIENT
Start: 2021-08-10

## 2021-08-10 RX ORDER — TAMSULOSIN HYDROCHLORIDE 0.4 MG/1
0.4 CAPSULE ORAL DAILY
Qty: 90 CAPSULE | Refills: 0 | Status: CANCELLED | OUTPATIENT
Start: 2021-08-10

## 2021-08-10 NOTE — PROGRESS NOTES
[address] [place] [address]               [phone]      Padmini Camacho is a 74 y.o. male who was seen by synchronous (real-time) audio-video technology on 8/10/2021. Consent: Padmini Camacho, who was seen by synchronous (real-time) audio-video technology, and/or his healthcare decision maker, is aware that this patient-initiated, Telehealth encounter on 8/10/2021 is a billable service, with coverage as determined by his insurance carrier. He is aware that he may receive a bill and has provided verbal consent to proceed: {YES/NO/NA-Consent obtained within past 12 months:40481}. Assessment & Plan:   {A/P PLUS DISPO FPSY:26100}      {Total time-based coding - will disappear if no selections made (Optional):51108::\"I spent at least 15 minutes on this visit with this established patient. \":0}  712  Subjective:     Health Maintenance Due   Topic Date Due    COVID-19 Vaccine (1) Never done    Shingrix Vaccine Age 50> (1 of 2) Never done    AAA Screening 73-69 YO Male Smoking Patients  Never done    Pneumococcal 65+ years (1 of 1 - PPSV23) 02/03/2019    MICROALBUMIN Q1  09/19/2020             Padmini Camacho is a 74 y.o. male who was seen for   Follow-up (medication refill )    Hypertension:   Patient reports taking medications as instructed. {Yes No:68944}   Medication side effects noted. {Yes No:66431}  Headache upon wakening. {Yes No:61280}   Home BP monitoring in range of ***'s systolic. Do you experience chest pain/pressure or SOB with exertion? {Yes No:14504}  Maintain a low salt diet? {Yes No:88696}  Key CAD CHF Meds             aspirin 81 mg chewable tablet (Taking) Take 1 Tablet by mouth daily. spironolactone (ALDACTONE) 25 mg tablet (Taking) Take 1 Tablet by mouth two (2) times a day. simvastatin (ZOCOR) 20 mg tablet (Taking) Take 1 Tablet by mouth nightly. metoprolol succinate (TOPROL-XL) 100 mg tablet (Taking) Take 1 Tab by mouth daily.     losartan (COZAAR) 100 mg tablet (Taking) Take 1 Tab by mouth daily. Xarelto 20 mg tab tablet (Taking) Take 1 Tab by mouth daily (with breakfast). bumetanide (BUMEX) 1 mg tablet (Taking) Take 1 Tab by mouth two (2) times a day. HLD:  Has been compliant with meds  {Fq:20061::\"most of the time\",\"all of the time\",\"occasionally\",\"never\"}  Compliant with low-fat diet. {Fq:20061::\"most of the time\",\"all of the time\",\"occasionally\",\"never\"}    Denies myalgias or other side effects. {Yes No:63223}  Cholesterol, total   Date Value Ref Range Status   10/06/2020 153 <200 MG/DL Final     Triglyceride   Date Value Ref Range Status   10/06/2020 134 <150 MG/DL Final     Comment:     The drugs N-acetylcysteine (NAC) and  Metamiszole have been found to cause falsely  low results in this chemical assay. Please  be sure to submit blood samples obtained  BEFORE administration of either of these  drugs to assure correct results. HDL Cholesterol   Date Value Ref Range Status   10/06/2020 71 (H) 40 - 60 MG/DL Final     LDL, calculated   Date Value Ref Range Status   10/06/2020 55.2 0 - 100 MG/DL Final   ]  Key Antihyperlipidemia Meds             simvastatin (ZOCOR) 20 mg tablet (Taking) Take 1 Tablet by mouth nightly. Prior to Admission medications    Medication Sig Start Date End Date Taking? Authorizing Provider   aspirin 81 mg chewable tablet Take 1 Tablet by mouth daily. 8/9/21  Yes Ana Lazar NP   tiotropium (SPIRIVA) 18 mcg inhalation capsule Take 1 Capsule by inhalation daily. 7/14/21  Yes Ana Lazar NP   empagliflozin (Jardiance) 25 mg tablet Take 1 Tablet by mouth daily. 6/9/21  Yes Ana Lazar NP   spironolactone (ALDACTONE) 25 mg tablet Take 1 Tablet by mouth two (2) times a day. 6/1/21  Yes Ana Lazar NP   simvastatin (ZOCOR) 20 mg tablet Take 1 Tablet by mouth nightly.  6/1/21  Yes Ana Lazar NP   metoprolol succinate (TOPROL-XL) 100 mg tablet Take 1 Tab by mouth daily. 3/15/21  Yes Boogie Smith NP   tamsulosin (FLOMAX) 0.4 mg capsule Take 1 Cap by mouth daily. 3/3/21  Yes Cherrie Renee MD   metFORMIN (GLUCOPHAGE) 850 mg tablet  1/29/21  Yes Provider, Historical   allopurinoL (ZYLOPRIM) 100 mg tablet Take 1 Tab by mouth daily. 2/10/21  Yes Boogie Smith NP   losartan (COZAAR) 100 mg tablet Take 1 Tab by mouth daily. 2/10/21  Yes Boogie Smith NP   Xarelto 20 mg tab tablet Take 1 Tab by mouth daily (with breakfast). 2/10/21  Yes Boogie Smith NP   Calcium-Cholecalciferol, D3, 500 mg(1,250mg) -400 unit tab Take 1 Tab by mouth daily. 2/10/21  Yes Boogie Smith NP   ipratropium-albuteroL (Combivent Respimat)  mcg/actuation inhaler Take 1 Puff by inhalation every six (6) hours as needed. Yes Provider, Historical   docusate sodium (COLACE) 100 mg capsule Take 100 mg by mouth as needed for Constipation. Yes Provider, Historical   OTHER Vitamin d3 400 iu cap  daily   Yes Provider, Historical   Bydureon BCise 2 mg/0.85 mL atIn by SubCUTAneous route every seven (7) days. 9/29/20  Yes Provider, Historical   FreeStyle Pasquale 14 Day Sensor kit apply 1 SENSOR TO THE BACK OF UPPER ARM REMOVE AND REPLACE every . ..  (REFER TO PRESCRIPTION NOTES). 7/23/20  Yes Provider, Historical   fluticasone propion-salmeteroL (Advair HFA) 115-21 mcg/actuation inhaler Take 2 Puffs by inhalation two (2) times a day. 10/6/20  Yes Boogie Smith NP   LANTUS SOLOSTAR U-100 INSULIN 100 unit/mL (3 mL) inpn 62 Units by SubCUTAneous route Before breakfast and dinner. Indications: type 2 diabetes mellitus  Patient taking differently: 60 Units by SubCUTAneous route Before breakfast and dinner. Indications: type 2 diabetes mellitus 10/30/19  Yes Boogie Smith NP   bumetanide (BUMEX) 1 mg tablet Take 1 Tab by mouth two (2) times a day.  5/7/19  Yes Bravo Burt MD   APIDRA SOLOSTAR 100 unit/mL pen by SubCUTAneous route Before breakfast, lunch, and dinner. Indications: sliding scale 9/14/17  Yes Provider, Historical     No Known Allergies    {History SmartLink choices - disappears if left unselected (Optional):23814}    ROS      Objective: There were no vitals taken for this visit. General: alert, cooperative, no distress   Mental  status: normal mood, behavior, speech, dress, motor activity, and thought processes, able to follow commands   HENT: NCAT   Neck: no visualized mass   Resp: no respiratory distress   Neuro: no gross deficits   Skin: no discoloration or lesions of concern on visible areas   Psychiatric: normal affect, consistent with stated mood, no evidence of hallucinations     Additional exam findings: We discussed the expected course, resolution and complications of the diagnosis(es) in detail. Medication risks, benefits, costs, interactions, and alternatives were discussed as indicated. I advised him to contact the office if his condition worsens, changes or fails to improve as anticipated. He expressed understanding with the diagnosis(es) and plan. Love Banks is a 74 y.o. male who was evaluated by a video visit encounter for concerns as above. Patient identification was verified prior to start of the visit. A caregiver was present when appropriate. Due to this being a TeleHealth encounter ([facility]), evaluation of the following organ systems was limited: Vitals/Constitutional/EENT/Resp/CV/GI//MS/Neuro/Skin/Heme-Lymph-Imm. Pursuant to the emergency declaration under the [facility], [facility]5 waiver authority and the [employer] and [employer] General Act, this Virtual  Visit was conducted, with patient's (and/or legal guardian's) consent, to reduce the patient's risk of exposure to COVID-19 and provide necessary medical care.      Services were provided through a video synchronous discussion virtually to substitute for in-person clinic visit. Patient and provider were located at their individual homes. An After Visit Summary was printed and given to the patient. All diagnosis have been discussed with the patient and all of the patient's questions have been answered. Swapna Matson [facility] Rames[place].   Ros Corbett

## 2021-08-10 NOTE — PROGRESS NOTES
Please use this number [phone]    1. Have you been to the ER, urgent care clinic since your last visit? Hospitalized since your last visit? No    2. Have you seen or consulted any other health care providers outside of the [address] [place] [address] since your last visit? Include any pap smears or colon screening.  Yes Patient states \" I went to the ankle doctor and had [facility] type of test at [facility]"           Health Maintenance Due   Topic Date Due    COVID-19 Vaccine (1) Never done    Shingrix Vaccine Age 50> (3 of 2) Never done    AAA Screening 73-67 YO Male Smoking Patients  Never done    Pneumococcal 65+ years (1 of 1 - PPSV23) 02/03/2019    MICROALBUMIN Q1  09/19/2020

## 2021-08-11 DIAGNOSIS — C61 PROSTATE CANCER (HCC): ICD-10-CM

## 2021-08-11 RX ORDER — TAMSULOSIN HYDROCHLORIDE 0.4 MG/1
0.4 CAPSULE ORAL DAILY
Qty: 90 CAPSULE | Refills: 0 | Status: SHIPPED | OUTPATIENT
Start: 2021-08-11 | End: 2022-01-05 | Stop reason: SDUPTHER

## 2021-09-01 ENCOUNTER — OFFICE VISIT (OUTPATIENT)
Dept: FAMILY MEDICINE CLINIC | Age: 66
End: 2021-09-01
Payer: MEDICARE

## 2021-09-01 VITALS
WEIGHT: 285.4 LBS | DIASTOLIC BLOOD PRESSURE: 74 MMHG | OXYGEN SATURATION: 93 % | RESPIRATION RATE: 17 BRPM | BODY MASS INDEX: 40.86 KG/M2 | HEART RATE: 65 BPM | SYSTOLIC BLOOD PRESSURE: 118 MMHG | TEMPERATURE: 98.2 F | HEIGHT: 70 IN

## 2021-09-01 DIAGNOSIS — J43.8 OTHER EMPHYSEMA (HCC): ICD-10-CM

## 2021-09-01 DIAGNOSIS — I10 ESSENTIAL HYPERTENSION: Primary | ICD-10-CM

## 2021-09-01 DIAGNOSIS — M54.16 LEFT LUMBAR RADICULOPATHY: ICD-10-CM

## 2021-09-01 DIAGNOSIS — I50.22 CHRONIC SYSTOLIC CHF (CONGESTIVE HEART FAILURE), NYHA CLASS 2 (HCC): ICD-10-CM

## 2021-09-01 DIAGNOSIS — E78.00 HIGH CHOLESTEROL: ICD-10-CM

## 2021-09-01 DIAGNOSIS — Z79.4 TYPE 2 DIABETES MELLITUS WITH HYPERGLYCEMIA, WITH LONG-TERM CURRENT USE OF INSULIN (HCC): ICD-10-CM

## 2021-09-01 DIAGNOSIS — E11.65 TYPE 2 DIABETES MELLITUS WITH HYPERGLYCEMIA, WITH LONG-TERM CURRENT USE OF INSULIN (HCC): ICD-10-CM

## 2021-09-01 DIAGNOSIS — I48.0 PAROXYSMAL ATRIAL FIBRILLATION (HCC): ICD-10-CM

## 2021-09-01 DIAGNOSIS — I42.8 OTHER CARDIOMYOPATHY (HCC): ICD-10-CM

## 2021-09-01 PROCEDURE — G8432 DEP SCR NOT DOC, RNG: HCPCS | Performed by: NURSE PRACTITIONER

## 2021-09-01 PROCEDURE — G8417 CALC BMI ABV UP PARAM F/U: HCPCS | Performed by: NURSE PRACTITIONER

## 2021-09-01 PROCEDURE — G8752 SYS BP LESS 140: HCPCS | Performed by: NURSE PRACTITIONER

## 2021-09-01 PROCEDURE — G8536 NO DOC ELDER MAL SCRN: HCPCS | Performed by: NURSE PRACTITIONER

## 2021-09-01 PROCEDURE — 1101F PT FALLS ASSESS-DOCD LE1/YR: CPT | Performed by: NURSE PRACTITIONER

## 2021-09-01 PROCEDURE — G8427 DOCREV CUR MEDS BY ELIG CLIN: HCPCS | Performed by: NURSE PRACTITIONER

## 2021-09-01 PROCEDURE — 3017F COLORECTAL CA SCREEN DOC REV: CPT | Performed by: NURSE PRACTITIONER

## 2021-09-01 PROCEDURE — G8754 DIAS BP LESS 90: HCPCS | Performed by: NURSE PRACTITIONER

## 2021-09-01 PROCEDURE — 3051F HG A1C>EQUAL 7.0%<8.0%: CPT | Performed by: NURSE PRACTITIONER

## 2021-09-01 PROCEDURE — 99214 OFFICE O/P EST MOD 30 MIN: CPT | Performed by: NURSE PRACTITIONER

## 2021-09-01 PROCEDURE — 2022F DILAT RTA XM EVC RTNOPTHY: CPT | Performed by: NURSE PRACTITIONER

## 2021-09-01 RX ORDER — RIVAROXABAN 20 MG/1
20 TABLET, FILM COATED ORAL
Qty: 90 TABLET | Refills: 3 | Status: SHIPPED | OUTPATIENT
Start: 2021-09-01 | End: 2022-05-23 | Stop reason: SDUPTHER

## 2021-09-01 RX ORDER — METOPROLOL SUCCINATE 100 MG/1
100 TABLET, EXTENDED RELEASE ORAL DAILY
Qty: 90 TABLET | Refills: 0 | Status: SHIPPED | OUTPATIENT
Start: 2021-09-01 | End: 2022-03-02 | Stop reason: SDUPTHER

## 2021-09-01 RX ORDER — SIMVASTATIN 20 MG/1
20 TABLET, FILM COATED ORAL
Qty: 90 TABLET | Refills: 3 | Status: SHIPPED | OUTPATIENT
Start: 2021-09-01 | End: 2022-05-23 | Stop reason: SDUPTHER

## 2021-09-01 NOTE — PROGRESS NOTES
Room 9   Patient states he do not want the Flu vaccine today. Patient states can he get a electric wheelchair, he states he cant walk very far. Heaven Navarro presents today for   Chief Complaint   Patient presents with    Follow-up    Hypertension    Medication Refill       Is someone accompanying this pt? no    Is the patient using any DME equipment during OV? no    Depression Screening:  3 most recent PHQ Screens 9/1/2021   Little interest or pleasure in doing things Not at all   Feeling down, depressed, irritable, or hopeless Not at all   Total Score PHQ 2 0       Learning Assessment:  Learning Assessment 1/30/2020   PRIMARY LEARNER Patient   HIGHEST LEVEL OF EDUCATION - PRIMARY LEARNER  GRADUATED HIGH SCHOOL OR GED   BARRIERS PRIMARY LEARNER NONE   CO-LEARNER CAREGIVER No   PRIMARY LANGUAGE ENGLISH   LEARNER PREFERENCE PRIMARY LISTENING   ANSWERED BY Neville LARSON SELF       Fall Risk  Fall Risk Assessment, last 12 mths 9/1/2021   Able to walk? Yes   Fall in past 12 months? 0   Do you feel unsteady? 1   Are you worried about falling -       ADL  No flowsheet data found. Health Maintenance reviewed and discussed and ordered per Provider. Health Maintenance Due   Topic Date Due    Shingrix Vaccine Age 49> (3 of 2) Never done    AAA Screening 73-69 YO Male Smoking Patients  Never done    Pneumococcal 65+ years (1 of 1 - PPSV23) 02/03/2019    MICROALBUMIN Q1  09/19/2020    Flu Vaccine (1) 09/01/2021   . Coordination of Care:  1. Have you been to the ER, urgent care clinic since your last visit? Hospitalized since your last visit? no    2. Have you seen or consulted any other health care providers outside of the [address] [place] [address] since your last visit? Include any pap smears or colon screening.  Yes Asha,  test done    Health Maintenance Due   Topic Date Due    Shingrix Vaccine Age 49> (1 of 2) Never done    AAA Screening 73-69 YO Male Smoking Patients  Never done   Tivis Bran Pneumococcal 65+ years (1 of 1 - PPSV23) 02/03/2019    MICROALBUMIN Q1  09/19/2020    Flu Vaccine (1) 09/01/2021

## 2021-09-01 NOTE — PROGRESS NOTES
[address] [place] [address]               [phone]      Pedro Lanza is a 74 y.o. male and presents with Follow-up, Hypertension, and Medication Refill       Assessment/Plan:    Diagnoses and all orders for this visit:    1. Essential hypertension  -     metoprolol succinate (TOPROL-XL) 100 mg tablet; Take 1 Tablet by mouth daily. Endorses medication compliance, Refill provided and Blood pressure stable, continue same medications   2. High cholesterol  -     simvastatin (ZOCOR) 20 mg tablet; Take 1 Tablet by mouth nightly. Endorses medication compliance, Refill provided and Denies abdominal pain or symptoms of myalgia   3. Type 2 diabetes mellitus with hyperglycemia, with long-term current use of insulin (HCC)  Endorses medication compliance and managed by endocrinology   4. Paroxysmal atrial fibrillation (HCC)  -     Xarelto 20 mg tab tablet; Take 1 Tablet by mouth daily (with breakfast). Endorses medication compliance, Refill provided and cardiac conditions managed by cardiology   5. Chronic systolic CHF (congestive heart failure), NYHA class 2 ([facility])  -     AMB SUPPLY ORDER  oreder for motorized scooter placed per patient request   6. Other cardiomyopathy ([facility] [address]  -     AMB SUPPLY ORDER    7. Other emphysema ([facility])  -     AMB SUPPLY ORDER    8. Left lumbar radiculopathy  -     REFERRAL TO PHYSICAL THERAPY  this is not a new problem, evaluated in ED three months ago, will start with conservative treatment and refer to PT, pt. is in agreement with this plan of care       Follow-up and Dispositions    · Return in about 3 months (around 12/1/2021) for HTN, DM, CHF, 30 min, office only.          Subjective:    BAck pain  Onset: 6 months ago  Location: lower part, on the left side  Duration: intermittent  Characteristics: extends down through the buttocks about 1/2 to the hip, denies numbness or tingling, leg instability  aggrivated by constipation, standing for a long period of time  Relieving: tylenol  Sometimes the pain is present when he wakes up in the morning  Has had this problem evaluated about 3 months ago, went to ED  Was prescribed flexeril, diagnosed with left lumbar radiculopathy      CHF  Is not performing daily weights  Denies increase SOB difficulty breathing  Sleeping on one pillow  No LE edema  He would like a motorized wheelchair and handicapped placecard    DMII- last visit with Dr. Edi Del Castillo was 8/31/21, last A1C 1/2021 7.4   Patient reports medication compliance Daily  Diabetic diet compliance most of the time  Patient monitors blood sugars regularly TID   Reports am fasting sugars range    Denies hypoglycemic episodes no, to 50's, last time this week, resolved with food  Denies polyuria, polydipsia, paraesthesia, vision changes? yes     Diabetic Foot and Eye Exam HM Status   Topic Date Due    Diabetic Foot Care  10/11/2021    Eye Exam  02/02/2023     Hemoglobin A1c   Date Value Ref Range Status   11/03/2020 8.4 (H) 4.8 - 5.6 % Final     Hemoglobin A1c, External   Date Value Ref Range Status   01/29/2021 7.4 % Final   ]  Creatinine, urine   Date Value Ref Range Status   09/19/2019 189.00 (H) 30 - 125 mg/dL Final     Microalbumin/Creat ratio (mg/g creat)   Date Value Ref Range Status   09/19/2019 16 0 - 30 mg/g Final     Key Antihyperglycemic Medications             metFORMIN (GLUCOPHAGE) 850 mg tablet (Taking) 850 mg two (2) times daily (with meals). Bydureon BCise 2 mg/0.85 mL atIn (Taking) by SubCUTAneous route every seven (7) days. LANTUS SOLOSTAR U-100 INSULIN 100 unit/mL (3 mL) inpn (Taking) 62 Units by SubCUTAneous route Before breakfast and dinner. Indications: type 2 diabetes mellitus    APIDRA SOLOSTAR 100 unit/mL pen (Taking) by SubCUTAneous route Before breakfast, lunch, and dinner. Indications: sliding scale    empagliflozin (Jardiance) 25 mg tablet Take 1 Tablet by mouth daily.         Hypertension:   Patient reports taking medications as instructed. yes   Medication side effects noted. no  Headache upon wakening. no   Home BP monitoring in range of does not check   Do you experience chest pain/pressure or SOB with exertion? no  Maintain a low salt diet? yes  Key CAD CHF Meds             Xarelto 20 mg tab tablet (Taking) Take 1 Tablet by mouth daily (with breakfast). metoprolol succinate (TOPROL-XL) 100 mg tablet (Taking) Take 1 Tablet by mouth daily. simvastatin (ZOCOR) 20 mg tablet (Taking) Take 1 Tablet by mouth nightly. aspirin 81 mg chewable tablet (Taking) Take 1 Tablet by mouth daily. spironolactone (ALDACTONE) 25 mg tablet (Taking) Take 1 Tablet by mouth two (2) times a day. losartan (COZAAR) 100 mg tablet (Taking) Take 1 Tab by mouth daily. bumetanide (BUMEX) 1 mg tablet (Taking) Take 1 Tab by mouth two (2) times a day. HLD:  Has been compliant with meds  all of the time  Compliant with low-fat diet. most of the time    Denies myalgias or other side effects. yes  Cholesterol, total   Date Value Ref Range Status   10/06/2020 153 <200 MG/DL Final     Triglyceride   Date Value Ref Range Status   10/06/2020 134 <150 MG/DL Final     Comment:     The drugs N-acetylcysteine (NAC) and  Metamiszole have been found to cause falsely  low results in this chemical assay. Please  be sure to submit blood samples obtained  BEFORE administration of either of these  drugs to assure correct results. HDL Cholesterol   Date Value Ref Range Status   10/06/2020 71 (H) 40 - 60 MG/DL Final     LDL, calculated   Date Value Ref Range Status   10/06/2020 55.2 0 - 100 MG/DL Final   ]  Key Antihyperlipidemia Meds             simvastatin (ZOCOR) 20 mg tablet (Taking) Take 1 Tablet by mouth nightly. ROS:     ROS  As stated in HPI, otherwise all others negative. The problem list was updated as a part of today's visit.   Patient Active Problem List   Diagnosis Code    Essential hypertension I10    Hx-TIA (transient ischemic attack) Z86.73    High cholesterol E78.00    Gout M10.9    Erectile disorder due to medical condition in male N52.1    Type 2 diabetes mellitus with hyperglycemia, with long-term current use of insulin (HCC) E11.65, Z79.4    Prostate cancer ([facility] [address] C61    Pacemaker Z95.0    BMI 40.0-44.9, adult (HCC) Z68.41    Congestive heart failure (HCC) I50.9    Pulmonary emphysema (HCC) J43.9    Cardiomyopathy (HCC) I42.9    Chronic systolic CHF (congestive heart failure), NYHA class 2 (HCC) I50.22    Noncompliance with treatment plan Z91.11    Paroxysmal atrial fibrillation (HCC) I48.0    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 SH:  Social History     Tobacco Use    Smoking status: Former Smoker     Packs/day: 0.50     Years: 20.00     Pack years: 10.00     Types: Cigarettes     Quit date:      Years since quittin.7    Smokeless tobacco: Never Used   Vaping Use    Vaping Use: Never used   Substance Use Topics    Alcohol use: Yes     Alcohol/week: 1.0 standard drinks     Types: 1 Shots of liquor per week     Comment: occasionally    Drug use: No       Medications/Allergies:  Current Outpatient Medications on File Prior to Visit   Medication Sig Dispense Refill    tamsulosin (FLOMAX) 0.4 mg capsule Take 1 Capsule by mouth daily. 90 Capsule 0    aspirin 81 mg chewable tablet Take 1 Tablet by mouth daily. 90 Tablet 3    tiotropium (SPIRIVA) 18 mcg inhalation capsule Take 1 Capsule by inhalation daily. 30 Capsule 2    spironolactone (ALDACTONE) 25 mg tablet Take 1 Tablet by mouth two (2) times a day. 180 Tablet 3    metFORMIN (GLUCOPHAGE) 850 mg tablet 850 mg two (2) times daily (with meals).  allopurinoL (ZYLOPRIM) 100 mg tablet Take 1 Tab by mouth daily.  90 Tab 1    losartan (COZAAR) 100 mg tablet Take 1 Tab by mouth daily. 90 Tab 1    Calcium-Cholecalciferol, D3, 500 mg(1,250mg) -400 unit tab Take 1 Tab by mouth daily. 90 Tab 1    ipratropium-albuteroL (Combivent Respimat)  mcg/actuation inhaler Take 1 Puff by inhalation every six (6) hours as needed.  docusate sodium (COLACE) 100 mg capsule Take 100 mg by mouth as needed for Constipation.  OTHER Vitamin d3 400 iu cap  daily      Bydureon BCise 2 mg/0.85 mL atIn by SubCUTAneous route every seven (7) days.  FreeStyle Pasquale 14 Day Sensor kit apply 1 SENSOR TO THE BACK OF UPPER ARM REMOVE AND REPLACE every . ..  (REFER TO PRESCRIPTION NOTES).  fluticasone propion-salmeteroL (Advair HFA) 115-21 mcg/actuation inhaler Take 2 Puffs by inhalation two (2) times a day. 1 Inhaler 5    LANTUS SOLOSTAR U-100 INSULIN 100 unit/mL (3 mL) inpn 62 Units by SubCUTAneous route Before breakfast and dinner. Indications: type 2 diabetes mellitus (Patient taking differently: 60 Units by SubCUTAneous route Before breakfast and dinner. Indications: type 2 diabetes mellitus) 15 Adjustable Dose Pre-filled Pen Syringe 2    bumetanide (BUMEX) 1 mg tablet Take 1 Tab by mouth two (2) times a day. 180 Tab 3    APIDRA SOLOSTAR 100 unit/mL pen by SubCUTAneous route Before breakfast, lunch, and dinner. Indications: sliding scale      empagliflozin (Jardiance) 25 mg tablet Take 1 Tablet by mouth daily. 90 Tablet 3     No current facility-administered medications on file prior to visit. No Known Allergies    Objective:  Visit Vitals  /74 (BP 1 Location: Left upper arm, BP Patient Position: Sitting)   Pulse 65   Temp 98.2 °F (36.8 °C) (Temporal)   Resp 17   Ht 5' 10\" (1.778 m)   Wt 285 lb 6.4 oz (129.5 kg)   SpO2 93%   BMI 40.95 kg/m²    Body mass index is 40.95 kg/m². Physical assessment  Physical Exam  Vitals and nursing note reviewed. Eyes:      Conjunctiva/sclera: Conjunctivae normal.      Pupils: Pupils are equal, round, and reactive to light.    Neck: Vascular: No JVD. Cardiovascular:      Rate and Rhythm: Normal rate and regular rhythm. Heart sounds: Normal heart sounds. No murmur heard. No friction rub. No gallop. Pulmonary:      Effort: Pulmonary effort is normal.      Breath sounds: Normal breath sounds. Musculoskeletal:         General: Normal range of motion. Cervical back: Normal range of motion. Skin:     General: Skin is warm and dry. Neurological:      Mental Status: He is alert and oriented to person, place, and time.    Psychiatric:         Mood and Affect: Affect normal.         Cognition and Memory: Memory normal.         Judgment: Judgment normal.           Labwork and Ancillary Studies:    CBC w/Diff  Lab Results   Component Value Date/Time    WBC 5.8 11/03/2020 10:44 AM    HGB 14.9 11/03/2020 10:44 AM    PLATELET 044 31/66/6306 10:44 AM         Basic Metabolic Profile  Lab Results   Component Value Date/Time    Sodium 140 11/03/2020 10:44 AM    Potassium 4.2 11/03/2020 10:44 AM    Chloride 102 11/03/2020 10:44 AM    CO2 29 11/03/2020 10:44 AM    Anion gap 9.2 11/03/2020 10:44 AM    Glucose 98 11/03/2020 10:44 AM    BUN 18 11/03/2020 10:44 AM    Creatinine 1.1 11/03/2020 10:44 AM    BUN/Creatinine ratio 15 10/06/2020 03:05 PM    GFR est AA >60 10/06/2020 03:05 PM    GFR est non-AA 53 (L) 10/06/2020 03:05 PM    Calcium 9.6 11/03/2020 10:44 AM        Cholesterol  Lab Results   Component Value Date/Time    Cholesterol, total 153 10/06/2020 03:05 PM    HDL Cholesterol 71 (H) 10/06/2020 03:05 PM    LDL, calculated 55.2 10/06/2020 03:05 PM    Triglyceride 134 10/06/2020 03:05 PM    CHOL/HDL Ratio 2.2 10/06/2020 03:05 PM       Health Maintenance:   Health Maintenance   Topic Date Due    Shingrix Vaccine Age 50> (1 of 2) Never done    AAA Screening 73-69 YO Male Smoking Patients  Never done    Pneumococcal 65+ years (1 of 1 - PPSV23) 02/03/2019    MICROALBUMIN Q1  09/19/2020    Flu Vaccine (1) 09/01/2021    Foot Exam Q1 10/11/2021    Lipid Screen  10/06/2021    A1C test (Diabetic or Prediabetic)  01/29/2022    Medicare Yearly Exam  02/11/2022    Eye Exam Retinal or Dilated  02/02/2023    DTaP/Tdap/Td series (2 - Td or Tdap) 03/31/2030    Colorectal Cancer Screening Combo  12/17/2030    Hepatitis C Screening  Completed    COVID-19 Vaccine  Completed       I have discussed the diagnosis with the patient and the intended plan as seen in the above orders. The patient has received an After-Visit Summary and questions were answered concerning future plans. An After Visit Summary was printed and given to the patient. All diagnosis have been discussed with the patient and all of the patient's questions have been answered. Follow-up and Dispositions    · Return in about 3 months (around 12/1/2021) for HTN, DM, CHF, 30 min, office only. Sveta Woo, DAYANAP-BC  [address]  [address]

## 2021-09-01 NOTE — PATIENT INSTRUCTIONS
Learning About Heart Failure Zones  What are heart failure zones? Heart failure zones give you an easy way to see changes in your heart failure symptoms. They also tell you when you need to get help. Check every day to see which zone you are in. Green zone. You are doing well. This is where you want to be. · Your weight is stable. It's not going up or down. · You breathe easily. · You are sleeping well. You are able to lie flat without shortness of breath. · You can do your usual activities. Yellow zone. Be careful. Your symptoms are changing. Call your doctor. · You have new or increased shortness of breath. · You are dizzy or lightheaded, or you feel like you may faint. · You have sudden weight gain, such as more than 2 to 3 pounds in a day or 5 pounds in a week. (Your doctor may suggest a different range of weight gain.)  · You have increased swelling in your legs, ankles, or feet. · You are so tired or weak that you can't do your usual activities. · You are not sleeping well. Shortness of breath wakes you up at night. You need extra pillows. Red zone. This is an emergency. Call 911. You have symptoms of sudden heart failure. For example:  · You have severe trouble breathing. · You cough up pink, foamy mucus. · You have a new irregular or fast heartbeat. You have symptoms of a heart attack. These may include:  · Chest pain or pressure, or a strange feeling in the chest.  · Sweating. · Shortness of breath. · Nausea or vomiting. · Pain, pressure, or a strange feeling in the back, neck, jaw, or upper belly or in one or both shoulders or arms. · Lightheadedness or sudden weakness. · A fast or irregular heartbeat. If you have symptoms of a heart attack: After you call 911, the  may tell you to chew 1 adult-strength or 2 to 4 low-dose aspirin. Wait for an ambulance. Do not try to drive yourself. Follow-up care is a key part of your treatment and safety.  Be sure to make and go to all appointments, and call your doctor if you are having problems. It's also a good idea to know your test results and keep a list of the medicines you take. Where can you learn more? Go to http://www.gray.com/  Enter T174 in the search box to learn more about \"Learning About Heart Failure Zones. \"  Current as of: August 31, 2020               Content Version: 12.8  © 2006-2021 Healthwise, [employer]. Care instructions adapted under license by [employer] (which disclaims liability or warranty for this information). If you have questions about a medical condition or this instruction, always ask your healthcare professional. [address] any warranty or liability for your use of this information.

## 2021-09-09 ENCOUNTER — APPOINTMENT (OUTPATIENT)
Dept: PHYSICAL THERAPY | Age: 66
End: 2021-09-09
Attending: NURSE PRACTITIONER

## 2021-09-15 PROBLEM — M54.16 LEFT LUMBAR RADICULOPATHY: Status: ACTIVE | Noted: 2021-09-15

## 2021-09-15 PROBLEM — Z86.16 HISTORY OF 2019 NOVEL CORONAVIRUS DISEASE (COVID-19): Status: ACTIVE | Noted: 2021-09-15

## 2021-09-21 ENCOUNTER — HOSPITAL ENCOUNTER (OUTPATIENT)
Dept: PHYSICAL THERAPY | Age: 66
Discharge: HOME | End: 2021-09-21
Attending: NURSE PRACTITIONER
Payer: MEDICARE

## 2021-09-21 PROCEDURE — 97530 THERAPEUTIC ACTIVITIES: CPT

## 2021-09-21 PROCEDURE — 97162 PT EVAL MOD COMPLEX 30 MIN: CPT

## 2021-09-21 PROCEDURE — 97110 THERAPEUTIC EXERCISES: CPT

## 2021-09-21 NOTE — PROGRESS NOTES
[facility]              [address]        [place], [address]   [phone] fax    Plan of Care/ Statement of Necessity for Physical Therapy Services    Patient name: Heaven Hayes Start of Care: 2021   Referral source: Gibson Frankel, NP : [id number]    Medical Diagnosis: Low back pain [M54.5]  Payor: Anu Holbrook / Plan: [place] PART A & B / Product Type: Medicare /  Onset Date: 2021    Treatment Diagnosis: Lumbopelvic and Left Hip Hypomobility   Prior Hospitalization: see medical history Provider#: [id number]   Medications: Verified on Patient summary List    Comorbidities: HTN, Diabetes mellitus Type II, Atrial Fibrillation, CHF, Cardiomyopathy, Emphysema, TIA, BMI>30, COVID-19 (Dx 2021, Negative Test 2021), Former Smoker, Right Ankle ORIF (), Pacemaker (), Penile Implant (inability to lie prone)   Prior Level of Function: I) Functional ADLs, (I) Self-Care ADLs, Retired, No Fall History      The Plan of Care and following information is based on the information from the initial evaluation. Assessment:    Patient presents with s/s consistent with lumbar and left hip hypomobility with insidious onset of symptoms 2021 without known history of trauma nor injury. Observationally patient demonstrates a prominent left antalgic gait pattern with patient advised regarding the potential benefit of use of SPC to improve weightbearing tolerance. Patient as well with a complicated medical history to include CHF and COPD with recent diagnosis of COVID-19, (+) test received 2021 with (-) test received 2021. Secondary to above mentioned comorbidities patient demonstrates poor cardiopulmonary status thus with limited activity tolerance.  To emphasize improvements in available lumbopelvic and hip ROM and strength with progression to promote improvement in static and dynamic balance to improve patient ease and safety with recreational and household ADLs.    Patient will continue to benefit from skilled PT services to modify and progress therapeutic interventions, address functional mobility deficits, address ROM deficits, address strength deficits, analyze and address soft tissue restrictions, analyze and cue movement patterns, analyze and modify body mechanics/ergonomics, assess and modify postural abnormalities, address imbalance/dizziness and instruct in home and community integration to attain remaining goals. Key Information:    Posture: Symmetrical iliac crest height, No observable lateral trunk shift  Gait:    Without AD, Left antalgic gait pattern     Functional Tests:  1. SLS: Left SLS < 2 sec / Right SLS 4 sec  2. Sit-to-Stand (chair): Ability to perform without UE assistance without pain provocation     Lumbar Active Movements: []? N/A   []? Too acute   []? Other:  ROM % AROM Comments   Forward flexion 40-60 25% limited     Extension 20-30 50% limited Central lumbar \"pressure\"   SB right 20-30 50% limited Central lumbar \"pressure\"   SB left 20-30 50% limited Central lumbar \"stretch\"       Neuro Screen []? WNL  Dermatome: Intact and symmetrical bilaterally L2-S1  Reflexes: 0+ L/R Patellar, 0+ L/R Achilles     Dural Mobility:  SLR Supine: (-) Left    LE MMT      Left Right   Hip Flexion 5 5     Extension NT NT     Abduction 3+  NT     ER 5 5     IR 5 5   Knee Flexion 5 5     Extension 5 5   Ankle Dorsiflexion 5 5     Hallux Ext 5 5     Ankle Eversion 5 5   *Assessed in supine     Special Tests  Sacroilliac:     Leg Length:     []? +    [x]? -   Position       Hip:            Phillip Sous:              []? R    [x]? L    [x]? +    []? -                           Scour:              []? R    [x]? L    []? +    [x]? -                           FADDIR:          []? R    [x]? L    []? +    [x]?  -        Deficits:     Hamstrings 90/90: Left (moderate)    Evaluation Complexity History MEDIUM  Complexity : 1-2 comorbidities / personal factors will impact the outcome/ POC ; Examination MEDIUM Complexity : 3 Standardized tests and measures addressing body structure, function, activity limitation and / or participation in recreation  ;Presentation MEDIUM Complexity : Evolving with changing characteristics  ; Clinical Decision Making MEDIUM Complexity : FOTO score of 26-74  Overall Complexity Rating: MEDIUM  Problem List: pain affecting function, decrease ROM, decrease strength, impaired gait/ balance, decrease ADL/ functional abilitiies, decrease activity tolerance, decrease flexibility/ joint mobility and decrease transfer abilities   Treatment Plan may include any combination of the following: Therapeutic exercise, Therapeutic activities, Neuromuscular re-education, Physical agent/modality, Gait/balance training, Manual therapy, Patient education, Self Care training, Functional mobility training, Home safety training and Stair training  Patient / Family readiness to learn indicated by: asking questions, trying to perform skills and interest  Persons(s) to be included in education: patient (P)  Barriers to Learning/Limitations: None  Patient Goal (s): To get better  Patient Self Reported Health Status: fair  Rehabilitation Potential: fair    Short Term Goals: To be accomplished in 2 weeks:  1. Patient will subjectively report full compliance with prescribed HEP. Eval: HEP provided  2. Patient will demonstrate standing lumbar extension AROM </= 25% limited to improve ease with overhead lifting. Eval: Standing Lumbar Extension AROM = 50% limited  3. Patient will demonstrate left hip abduction MMT >/= 4+/5 to improve safety with ambulation on uneven ground. Eval: Left Hip Abduction MMT = 3+/5    Long Term Goals: To be accomplished in 4 weeks:  1. Patient will demonstrate a significant functional improvement as demonstrated by a score of >/= 61 on FOTO. Eval: FOTO = 47       2.  Patient will demonstrate left/right SLS >/= 15 seconds to demonstrate a reduced falls risk. Eval: Left SLS < 2 sec / Right SLS 4 sec  3. Patient will demonstrate standing tolerance >/= 15 minutes with pain </= 5/10 to improve ease with self-care ADLs. Eval: Standing Tolerance < 2 sec (pain level 10/10)    Frequency / Duration: Patient to be seen 1-2 times per week for 4 weeks. Patient requests to be seen at a frequency of 1x/week, reason not provided for request.    Patient/ Caregiver education and instruction: Diagnosis, prognosis, self care, activity modification and exercises   [x]  Plan of care has been reviewed with PTA      Certification Period: 9/21/2021 - 10/20/2021  Susan Flores, PT 9/21/2021 6:58 AM  ________________________________________________________________________    I certify that the above Therapy Services are being furnished while the patient is under my care. I agree with the treatment plan and certify that this therapy is necessary.     [de-identified] Signature:____________Date:_________TIME:________     Nikita Ty NP  ** Signature, Date and Time must be completed for valid certification **  Please sign and return to [facility] [address], [facility]   [phone] fax

## 2021-09-28 ENCOUNTER — HOSPITAL ENCOUNTER (OUTPATIENT)
Dept: PHYSICAL THERAPY | Age: 66
Discharge: HOME | End: 2021-09-28
Attending: NURSE PRACTITIONER
Payer: MEDICARE

## 2021-09-28 PROCEDURE — 97140 MANUAL THERAPY 1/> REGIONS: CPT

## 2021-09-28 PROCEDURE — 97110 THERAPEUTIC EXERCISES: CPT

## 2021-09-28 NOTE — PROGRESS NOTES
PT DAILY TREATMENT NOTE     Patient Name: Taylor Milligan  Date:2021  : 1955  [x]  Patient  Verified  Payor: [place] MEDICARE / Plan: [place] MEDICARE PART A & B / Product Type: Medicare /    In time:10:50  Out time:11:31  Total Treatment Time (min): 41  Visit #: 2 of 8    Medicare/BCBS Only   Total Timed Codes (min):  41 1:1 Treatment Time:  41       Treatment Area: Low back pain [M54.5]    SUBJECTIVE  Pain Level (0-10 scale): 2  Any medication changes, allergies to medications, adverse drug reactions, diagnosis change, or new procedure performed?: [x] No    [] Yes (see summary sheet for update)  Subjective functional status/changes:   [] No changes reported  Patient reports that he has a walking tolerance of 1 min before he has increase in pain. OBJECTIVE      33 min Therapeutic Exercise:  [x] See flow sheet :   Rationale: increase ROM, increase strength, improve coordination, improve balance and increase proprioception to improve the patients ability to increase ease with ADLs. 8 min Manual Therapy:    Supine- left LE long axial distraction  Supine- left hip AP mob grade II-III   The manual therapy interventions were performed at a separate and distinct time from the therapeutic activities interventions. Rationale: decrease pain, increase ROM, increase tissue extensibility and decrease trigger points to increase standing tolerance. With   [] TE   [] TA   [] neuro   [] other: Patient Education: [x] Review HEP    [] Progressed/Changed HEP based on:   [] positioning   [] body mechanics   [] transfers   [] heat/ice application    [] other:      Other Objective/Functional Measures: pt reports performing HEP. Pain Level (0-10 scale) post treatment: 0    ASSESSMENT/Changes in Function: Initiated exercises per POC. Patient reports relief with hip AP mobs.  Reviewed supine hip flexor stretch and suggested patient to perform that on his bed due to not feeling a stretch with performing it on the couch. Patient will continue to benefit from skilled PT services to modify and progress therapeutic interventions, address functional mobility deficits, address ROM deficits, address strength deficits and analyze and address soft tissue restrictions to attain remaining goals. []  See Plan of Care  []  See progress note/recertification  []  See Discharge Summary         Progress towards goals / Updated goals:  Short Term Goals: To be accomplished in 2 weeks:  1. Patient will subjectively report full compliance with prescribed HEP. Eval: HEP provided  Current: MET: pt reports performing HEP. 9/28/21  2. Patient will demonstrate standing lumbar extension AROM </= 25% limited to improve ease with overhead lifting. Eval: Standing Lumbar Extension AROM = 50% limited  3. Patient will demonstrate left hip abduction MMT >/= 4+/5 to improve safety with ambulation on uneven ground. Eval: Left Hip Abduction MMT = 3+/5     Long Term Goals: To be accomplished in 4 weeks:  1. Patient will demonstrate a significant functional improvement as demonstrated by a score of >/= 61 on FOTO. Eval: FOTO = 47       2. Patient will demonstrate left/right SLS >/= 15 seconds to demonstrate a reduced falls risk. Eval: Left SLS < 2 sec / Right SLS 4 sec  3. Patient will demonstrate standing tolerance >/= 15 minutes with pain </= 5/10 to improve ease with self-care ADLs.   Eval: Standing Tolerance < 2 sec (pain level 10/10)    PLAN  []  Upgrade activities as tolerated     [x]  Continue plan of care  []  Update interventions per flow sheet       []  Discharge due to:_  []  Other:_      Madelyn Dao PTA 9/28/2021  10:54 AM    Future Appointments   Date Time Provider Mayuri Duncan   9/28/2021 11:00 AM Agustin De Leon PTA [facility]   10/5/2021 11:00 AM Agustin De Leon PTA [facility]   10/12/2021 11:45 AM Juan Manuel Soto PT [facility]   10/19/2021 11:00 AM Agustin De Leon PTA [facility]   1/27/2022  9:40 AM Sharon Irvin Shara Manrique, NP [address] [facility]

## 2021-10-05 ENCOUNTER — HOSPITAL ENCOUNTER (OUTPATIENT)
Dept: PHYSICAL THERAPY | Age: 66
Discharge: HOME | End: 2021-10-05
Attending: NURSE PRACTITIONER
Payer: MEDICARE

## 2021-10-05 PROCEDURE — 97140 MANUAL THERAPY 1/> REGIONS: CPT

## 2021-10-05 PROCEDURE — 97110 THERAPEUTIC EXERCISES: CPT

## 2021-10-05 NOTE — PROGRESS NOTES
PT DAILY TREATMENT NOTE     Patient Name: Kailyn Salinas  VTZ  : 1955  [x]  Patient  Verified  Payor: [place] MEDICARE / Plan: [place] MEDICARE PART A & B / Product Type: Medicare /    In time:10:56  Out time:11:38  Total Treatment Time (min): 42  Visit #: 3 of 8    Medicare/BCBS Only   Total Timed Codes (min):  42 1:1 Treatment Time:  25       Treatment Area: Low back pain [M54.5]    SUBJECTIVE  Pain Level (0-10 scale): 2  Any medication changes, allergies to medications, adverse drug reactions, diagnosis change, or new procedure performed?: [x] No    [] Yes (see summary sheet for update)  Subjective functional status/changes:   [] No changes reported  Patient reports that he has noticed that he is able to bend over better. OBJECTIVE       34 min Therapeutic Exercise:  [x]? See flow sheet :   Rationale: increase ROM, increase strength, improve coordination, improve balance and increase proprioception to improve the patients ability to increase ease with ADLs.       8 min Manual Therapy:    Supine- left LE long axial distraction  Supine- left hip AP mob grade II-III   The manual therapy interventions were performed at a separate and distinct time from the therapeutic activities interventions. Rationale: decrease pain, increase ROM, increase tissue extensibility and decrease trigger points to increase standing tolerance. With   [] TE   [] TA   [] neuro   [] other: Patient Education: [x] Review HEP    [] Progressed/Changed HEP based on:   [] positioning   [] body mechanics   [] transfers   [] heat/ice application    [] other:      Other Objective/Functional Measures: standing tolerance = 2 mins (pain level 4/10)     Pain Level (0-10 scale) post treatment: 7    ASSESSMENT/Changes in Function: Patient subjectively reports that he has noticed increase in standing tolerance.      Patient will continue to benefit from skilled PT services to modify and progress therapeutic interventions, address functional mobility deficits, address ROM deficits, address strength deficits and analyze and address soft tissue restrictions to attain remaining goals. []  See Plan of Care  []  See progress note/recertification  []  See Discharge Summary         Progress towards goals / Updated goals:  Short Term Goals: To be accomplished in 2 weeks:  1. Patient will subjectively report full compliance with prescribed HEP. Eval: HEP provided  Current: MET: pt reports performing HEP. 9/28/21  2. Patient will demonstrate standing lumbar extension AROM </= 25% limited to improve ease with overhead lifting. Eval: Standing Lumbar Extension AROM = 50% limited  3. Patient will demonstrate left hip abduction MMT >/= 4+/5 to improve safety with ambulation on uneven ground. Eval: Left Hip Abduction MMT = 3+/5  Current: progressing: left hip abduction MMT = 4/5 (pain level 4/10.) 10/5/21     Long Term Goals: To be accomplished in 4 weeks:  1. Patient will demonstrate a significant functional improvement as demonstrated by a score of >/= 61 on FOTO. Eval: FOTO = 47       2. Patient will demonstrate left/right SLS >/= 15 seconds to demonstrate a reduced falls risk. Eval: Left SLS < 2 sec / Right SLS 4 sec  3. Patient will demonstrate standing tolerance >/= 15 minutes with pain </= 5/10 to improve ease with self-care ADLs.   Eval: Standing Tolerance < 2 sec (pain level 10/10)  Current: Progressing: standing tolerance = 2 mins (pain level 4/10) 10/5/21       PLAN  []  Upgrade activities as tolerated     [x]  Continue plan of care  []  Update interventions per flow sheet       []  Discharge due to:_  []  Other:_      Ángela Segovia PTA 10/5/2021  10:53 AM    Future Appointments   Date Time Provider Mayuri Duncan   10/5/2021 11:00 AM Susan Estrella PTA [facility]   10/12/2021 11:45 AM Jitendra Gillespie PT [facility]   10/19/2021 11:00 AM Susan Estrella PTA [facility]   1/27/2022  9:40 AM Alan Lobo, NP [address] [place] [place]

## 2021-10-12 ENCOUNTER — HOSPITAL ENCOUNTER (OUTPATIENT)
Dept: PHYSICAL THERAPY | Age: 66
Discharge: HOME | End: 2021-10-12
Attending: NURSE PRACTITIONER
Payer: MEDICARE

## 2021-10-12 PROCEDURE — 97110 THERAPEUTIC EXERCISES: CPT | Performed by: PHYSICAL THERAPIST

## 2021-10-12 NOTE — PROGRESS NOTES
PT DAILY TREATMENT NOTE     Patient Name: Eloise Goode  Date:10/12/2021  : 1955  [x]  Patient  Verified  Payor: [place] MEDICARE / Plan: [place] MEDICARE PART A & B / Product Type: Medicare /    In time:11:37  Out time:11:48  Total Treatment Time (min): 11  Visit #: 4 of 8    Medicare/BCBS Only   Total Timed Codes (min):  11 1:1 Treatment Time:  11       Treatment Area: Low back pain [M54.5]    SUBJECTIVE  Pain Level (0-10 scale): 0  Any medication changes, allergies to medications, adverse drug reactions, diagnosis change, or new procedure performed?: [x] No    [] Yes (see summary sheet for update)  Subjective functional status/changes:   [] No changes reported  Patient with CC breathing issues today due to his COPD. Patient requested shortened treatment today due to increased difficulty breathing. OBJECTIVE    11 min Therapeutic Exercise:  [] See flow sheet :   Rationale: increase ROM and increase strength to improve the patients ability to increase ease with ADLs. With   [] TE   [] TA   [] neuro   [] other: Patient Education: [x] Review HEP    [] Progressed/Changed HEP based on:   [] positioning   [] body mechanics   [] transfers   [] heat/ice application    [] other:      Other Objective/Functional Measures: SLS 2 seconds right/4 seconds left with multiple touches over 30 seconds. Pain Level (0-10 scale) post treatment: 0    ASSESSMENT/Changes in Function:  Patient with difficulty breathing today. He was unable to complete his program.    Patient will continue to benefit from skilled PT services to modify and progress therapeutic interventions, address functional mobility deficits, address ROM deficits, address strength deficits and analyze and address soft tissue restrictions to attain remaining goals.      [x]  See Plan of Care  []  See progress note/recertification  []  See Discharge Summary         Progress towards goals / Updated goals:  Short Term Goals: To be accomplished in 2 weeks:  1. Patient will subjectively report full compliance with prescribed HEP. Eval: HEP provided  Current: MET: pt reports performing HEP. 9/28/21  2. Patient will demonstrate standing lumbar extension AROM </= 25% limited to improve ease with overhead lifting. Eval: Standing Lumbar Extension AROM = 50% limited  3. Patient will demonstrate left hip abduction MMT >/= 4+/5 to improve safety with ambulation on uneven ground. Eval: Left Hip Abduction MMT = 3+/5  Current: progressing: left hip abduction MMT = 4/5 (pain level 4/10.) 10/5/21     Long Term Goals: To be accomplished in 4 weeks:  1. Patient will demonstrate a significant functional improvement as demonstrated by a score of >/= 61 on FOTO. Eval: FOTO = 47       2. Patient will demonstrate left/right SLS >/= 15 seconds to demonstrate a reduced falls risk. Eval: Left SLS < 2 sec / Right SLS 4 sec  Current:  Left 4 seconds/Right 2 seconds. 10/12/2021. No change. 3. Patient will demonstrate standing tolerance >/= 15 minutes with pain </= 5/10 to improve ease with self-care ADLs.   Eval: Standing Tolerance < 2 sec (pain level 10/10)  Current: Progressing: standing tolerance = 2 mins (pain level 4/10) 10/5/21    PLAN  [x]  Upgrade activities as tolerated     [x]  Continue plan of care  []  Update interventions per flow sheet       []  Discharge due to:_  []  Other:_      Mary Jane Alves, PT 10/12/2021  11:38 AM    Future Appointments   Date Time Provider Mayuri Duncan   10/12/2021 11:45 AM Annis Moritz, PT [facility]   10/19/2021 11:00 AM Lisbet Gallegos PTA [facility]   1/27/2022  9:40 AM JUAQUIN Solano

## 2021-10-19 ENCOUNTER — HOSPITAL ENCOUNTER (OUTPATIENT)
Dept: PHYSICAL THERAPY | Age: 66
End: 2021-10-19
Attending: NURSE PRACTITIONER
Payer: MEDICARE

## 2021-12-06 DIAGNOSIS — J44.9 CHRONIC OBSTRUCTIVE PULMONARY DISEASE, UNSPECIFIED COPD TYPE (HCC): ICD-10-CM

## 2021-12-06 DIAGNOSIS — J43.8 OTHER EMPHYSEMA (HCC): ICD-10-CM

## 2021-12-07 RX ORDER — FLUTICASONE PROPIONATE AND SALMETEROL XINAFOATE 115; 21 UG/1; UG/1
2 AEROSOL, METERED RESPIRATORY (INHALATION) 2 TIMES DAILY
Qty: 1 EACH | Refills: 3 | Status: SHIPPED | OUTPATIENT
Start: 2021-12-07 | End: 2022-05-23 | Stop reason: SDUPTHER

## 2021-12-07 NOTE — PROGRESS NOTES
[facility]              [address]        [place], [address]   [phone] fax    Discharge Summary  Patient name: Justino Love Start of Care: 2021   Referral source: Cedric Hercules NP : 1955   Medical/Treatment Diagnosis: Low back pain [M54.5]  Payor: Faby Guzman / Plan: [address] [place] [address] / Product Type: Medicare /  Onset Date:2021     Prior Hospitalization: see medical history Provider#: [id number]   Medications: Verified on Patient Summary List    Comorbidities: HTN, Diabetes mellitus Type II, Atrial Fibrillation, CHF, Cardiomyopathy, Emphysema, TIA, BMI>30, COVID-19 (Dx 2021, Negative Test 2021), Former Smoker, Right Ankle ORIF (), Pacemaker (), Penile Implant (inability to lie prone)  Prior Level of Function: I) Functional ADLs, (I) Self-Care ADLs, Retired, No Fall History                            Visits from [facility] Care: 4    Missed Visits: 2  Reporting Period : 2021 to 2021    Summary of Care:  Short Term Goals: To be accomplished in 2 weeks:  1. Patient will subjectively report full compliance with prescribed HEP. Eval: HEP provided  Current: MET: pt reports performing HEP. 21  2. Patient will demonstrate standing lumbar extension AROM </= 25% limited to improve ease with overhead lifting. Eval: Standing Lumbar Extension AROM = 50% limited  3. Patient will demonstrate left hip abduction MMT >/= 4+/5 to improve safety with ambulation on uneven ground. Eval: Left Hip Abduction MMT = 3+/5  Current: progressing: left hip abduction MMT = 4/5 (pain level 4/10.) 10/5/21     Long Term Goals: To be accomplished in 4 weeks:  1. Patient will demonstrate a significant functional improvement as demonstrated by a score of >/= 61 on FOTO. Eval: FOTO = 47       2. Patient will demonstrate left/right SLS >/= 15 seconds to demonstrate a reduced falls risk.   Eval: Left SLS < 2 sec / Right SLS 4 sec  Current:  Left 4 seconds/Right 2 seconds. 10/12/2021. No change. 3. Patient will demonstrate standing tolerance >/= 15 minutes with pain </= 5/10 to improve ease with self-care ADLs.   Eval: Standing Tolerance < 2 sec (pain level 10/10)  Current: Progressing: standing tolerance = 2 mins (pain level 4/10) 10/5/21    ASSESSMENT/RECOMMENDATIONS:  [x]Discontinue therapy: []Patient has reached or is progressing toward set goals      [x]Patient has abdicated, requested discharge from skilled PT      []Due to lack of appreciable progress towards set goals    Ori Colin, PT 12/7/2021 7:35 AM

## 2022-01-05 DIAGNOSIS — C61 PROSTATE CANCER (HCC): ICD-10-CM

## 2022-01-05 RX ORDER — TAMSULOSIN HYDROCHLORIDE 0.4 MG/1
0.4 CAPSULE ORAL DAILY
Qty: 90 CAPSULE | Refills: 0 | Status: SHIPPED | OUTPATIENT
Start: 2022-01-05 | End: 2022-08-01 | Stop reason: SDUPTHER

## 2022-01-13 DIAGNOSIS — M1A.9XX0 CHRONIC GOUT INVOLVING TOE OF RIGHT FOOT WITHOUT TOPHUS, UNSPECIFIED CAUSE: ICD-10-CM

## 2022-01-13 DIAGNOSIS — I10 ESSENTIAL HYPERTENSION: ICD-10-CM

## 2022-01-14 RX ORDER — ALLOPURINOL 100 MG/1
100 TABLET ORAL DAILY
Qty: 90 TABLET | Refills: 0 | Status: SHIPPED | OUTPATIENT
Start: 2022-01-14 | End: 2022-05-23

## 2022-01-14 RX ORDER — LOSARTAN POTASSIUM 100 MG/1
100 TABLET ORAL DAILY
Qty: 90 TABLET | Refills: 0 | Status: SHIPPED | OUTPATIENT
Start: 2022-01-14 | End: 2022-05-23 | Stop reason: SDUPTHER

## 2022-01-18 ENCOUNTER — VIRTUAL VISIT (OUTPATIENT)
Dept: FAMILY MEDICINE CLINIC | Age: 67
End: 2022-01-18
Payer: MEDICARE

## 2022-01-18 DIAGNOSIS — E78.00 HIGH CHOLESTEROL: ICD-10-CM

## 2022-01-18 DIAGNOSIS — Z79.4 TYPE 2 DIABETES MELLITUS WITH HYPERGLYCEMIA, WITH LONG-TERM CURRENT USE OF INSULIN (HCC): ICD-10-CM

## 2022-01-18 DIAGNOSIS — I50.22 CHRONIC SYSTOLIC CHF (CONGESTIVE HEART FAILURE), NYHA CLASS 2 (HCC): ICD-10-CM

## 2022-01-18 DIAGNOSIS — J43.8 OTHER EMPHYSEMA (HCC): ICD-10-CM

## 2022-01-18 DIAGNOSIS — E11.65 TYPE 2 DIABETES MELLITUS WITH HYPERGLYCEMIA, WITH LONG-TERM CURRENT USE OF INSULIN (HCC): ICD-10-CM

## 2022-01-18 DIAGNOSIS — I10 ESSENTIAL HYPERTENSION: Primary | ICD-10-CM

## 2022-01-18 DIAGNOSIS — I48.0 PAROXYSMAL ATRIAL FIBRILLATION (HCC): ICD-10-CM

## 2022-01-18 DIAGNOSIS — C61 PROSTATE CANCER (HCC): ICD-10-CM

## 2022-01-18 PROCEDURE — 99214 OFFICE O/P EST MOD 30 MIN: CPT | Performed by: NURSE PRACTITIONER

## 2022-01-18 PROCEDURE — 3017F COLORECTAL CA SCREEN DOC REV: CPT | Performed by: NURSE PRACTITIONER

## 2022-01-18 PROCEDURE — G8432 DEP SCR NOT DOC, RNG: HCPCS | Performed by: NURSE PRACTITIONER

## 2022-01-18 PROCEDURE — 2022F DILAT RTA XM EVC RTNOPTHY: CPT | Performed by: NURSE PRACTITIONER

## 2022-01-18 PROCEDURE — G8756 NO BP MEASURE DOC: HCPCS | Performed by: NURSE PRACTITIONER

## 2022-01-18 PROCEDURE — 1101F PT FALLS ASSESS-DOCD LE1/YR: CPT | Performed by: NURSE PRACTITIONER

## 2022-01-18 PROCEDURE — 3046F HEMOGLOBIN A1C LEVEL >9.0%: CPT | Performed by: NURSE PRACTITIONER

## 2022-01-18 PROCEDURE — G8427 DOCREV CUR MEDS BY ELIG CLIN: HCPCS | Performed by: NURSE PRACTITIONER

## 2022-01-18 NOTE — PROGRESS NOTES
[address] [place] [address]               [phone]      Florina Lugo is a 69 y.o. male who was seen by synchronous (real-time) audio-video technology on 1/18/2022. Consent: Florina Lugo, who was seen by synchronous (real-time) audio-video technology, and/or his healthcare decision maker, is aware that this patient-initiated, Telehealth encounter on 1/18/2022 is a billable service, with coverage as determined by his insurance carrier. He is aware that he may receive a bill and has provided verbal consent to proceed: Yes. Assessment & Plan:   Diagnoses and all orders for this visit:    1. Essential hypertension  Endorses medication compliance, Blood pressure stable, continue same medications and recently had labs completed with endocrinology, will monitor for that note   2. Type 2 diabetes mellitus with hyperglycemia, with long-term current use of insulin (HCC)  Endorses medication compliance, Denies signs and symptoms of hyper or hypoglycemia, Diabetes controlled, continue same treatment and managed by endocrinology, will monitor for that office note   3. High cholesterol  Endorses medication compliance, Denies abdominal pain or symptoms of myalgia and will get labs with next office visit   4. Other emphysema ([address]  Assessment & Plan:   monitored by specialist. No acute findings meriting change in the plan      5. Chronic systolic CHF (congestive heart failure), NYHA class 2 ([address]  Assessment & Plan:   monitored by specialist. No acute findings meriting change in the plan      6. Paroxysmal atrial fibrillation (HCC)  Assessment & Plan:   monitored by specialist. No acute findings meriting change in the plan      7. Prostate cancer [facility])  Assessment & Plan:   monitored by specialist. No acute findings meriting change in the plan      Follow-up and Dispositions    · Return in about 3 months (around 4/18/2022) for [address], DM, DM foot, HLD, HTN, 30 min, office only. 712  Subjective:     Health Maintenance Due   Topic Date Due    Shingrix Vaccine Age 49> (3 of 2) Never done    AAA Screening 73-67 YO Male Smoking Patients  Never done    Pneumococcal 65+ years (1 of 1 - PPSV23) 02/03/2019    MICROALBUMIN Q1  09/19/2020    Flu Vaccine (1) 09/01/2021    Lipid Screen  10/06/2021    Foot Exam Q1  10/11/2021    A1C test (Diabetic or Prediabetic)  01/29/2022    Medicare Yearly Exam  02/11/2022             Florina Lugo is a 69 y.o. male who was seen for   Follow Up Chronic Condition, Hypertension, and Cholesterol Problem     DMII- managed by endocrinology, had labs on last visit, follow up due april   Patient reports medication compliance Daily  Diabetic diet compliance most of the time  Patient monitors blood sugars regularly TID   Reports am fasting sugars range AM:   afternoon: 160   PM:    Denies hypoglycemic episodes no  Denies polyuria, polydipsia, paraesthesia, vision changes? no  Engaging in daily exercise? No     Diabetic Foot and Eye Exam HM Status   Topic Date Due    Diabetic Foot Care  10/11/2021    Eye Exam  02/02/2023     Hemoglobin A1c   Date Value Ref Range Status   11/03/2020 8.4 (H) 4.8 - 5.6 % Final     Hemoglobin A1c, External   Date Value Ref Range Status   01/29/2021 7.4 % Final   ]  Creatinine, urine   Date Value Ref Range Status   09/19/2019 189.00 (H) 30 - 125 mg/dL Final     Microalbumin/Creat ratio (mg/g creat)   Date Value Ref Range Status   09/19/2019 16 0 - 30 mg/g Final     Key Antihyperglycemic Medications             empagliflozin (Jardiance) 25 mg tablet (Taking) Take 1 Tablet by mouth daily. metFORMIN (GLUCOPHAGE) 850 mg tablet (Taking) 850 mg two (2) times daily (with meals). Bydureon BCise 2 mg/0.85 mL atIn (Taking) by SubCUTAneous route every seven (7) days. LANTUS SOLOSTAR U-100 INSULIN 100 unit/mL (3 mL) inpn (Taking) 62 Units by SubCUTAneous route Before breakfast and dinner.  Indications: type 2 diabetes mellitus    APIDRA SOLOSTAR 100 unit/mL pen (Taking) by SubCUTAneous route Before breakfast, lunch, and dinner. Indications: sliding scale        Hypertension:  There were no vitals taken for this visit. Patient reports taking medications as instructed. yes   Medication side effects noted. no  Headache upon wakening. no   Home BP monitoring: Does not check  Do you experience chest pain/pressure or SOB with exertion? no  Maintain a low Sodium diet? yes  Key CAD CHF Meds             losartan (COZAAR) 100 mg tablet (Taking) Take 1 Tablet by mouth daily. Xarelto 20 mg tab tablet (Taking) Take 1 Tablet by mouth daily (with breakfast). metoprolol succinate (TOPROL-XL) 100 mg tablet (Taking) Take 1 Tablet by mouth daily. simvastatin (ZOCOR) 20 mg tablet (Taking) Take 1 Tablet by mouth nightly. aspirin 81 mg chewable tablet (Taking) Take 1 Tablet by mouth daily. spironolactone (ALDACTONE) 25 mg tablet (Taking) Take 1 Tablet by mouth two (2) times a day. bumetanide (BUMEX) 1 mg tablet (Taking) Take 1 Tab by mouth two (2) times a day. BUN   Date Value Ref Range Status   11/03/2020 18 6 - 22 mg/dL Final     Creatinine   Date Value Ref Range Status   11/03/2020 1.1 0.8 - 1.6 mg/dL Final     GFR est AA   Date Value Ref Range Status   10/06/2020 >60 >60 ml/min/1.73m2 Final     Potassium   Date Value Ref Range Status   11/03/2020 4.2 3.5 - 5.5 mmol/L Final       HLD:  Has been compliant with meds  Yes  Compliant with low-fat diet. most of the time    Denies myalgias or other side effects. yes  The 10-year ASCVD risk score (Denisha Mora., et al., 2013) is: 24%    Cholesterol, total   Date Value Ref Range Status   10/06/2020 153 <200 MG/DL Final     Triglyceride   Date Value Ref Range Status   10/06/2020 134 <150 MG/DL Final     Comment:     The drugs N-acetylcysteine (NAC) and  Metamiszole have been found to cause falsely  low results in this chemical assay.  Please  be sure to submit blood samples obtained  BEFORE administration of either of these  drugs to assure correct results. HDL Cholesterol   Date Value Ref Range Status   10/06/2020 71 (H) 40 - 60 MG/DL Final     LDL, calculated   Date Value Ref Range Status   10/06/2020 55.2 0 - 100 MG/DL Final   ]  Key Antihyperlipidemia Meds             simvastatin (ZOCOR) 20 mg tablet (Taking) Take 1 Tablet by mouth nightly. Prior to Admission medications    Medication Sig Start Date End Date Taking? Authorizing Provider   allopurinoL (ZYLOPRIM) 100 mg tablet Take 1 Tablet by mouth daily. 1/14/22  Yes Geovani Cody NP   losartan (COZAAR) 100 mg tablet Take 1 Tablet by mouth daily. 1/14/22  Yes Geovani Cody NP   tamsulosin (FLOMAX) 0.4 mg capsule Take 1 Capsule by mouth daily. 1/5/22  Yes Geovani Cody NP   tiotropium (SPIRIVA) 18 mcg inhalation capsule Take 1 Capsule by inhalation daily. 12/7/21  Yes Geovani Cody NP   ipratropium-albuteroL (Combivent Respimat)  mcg/actuation inhaler Take 1 Puff by inhalation every six (6) hours as needed for Wheezing or Shortness of Breath. 12/7/21  Yes Geovani Cody NP   fluticasone propion-salmeteroL (Advair HFA) 115-21 mcg/actuation inhaler Take 2 Puffs by inhalation two (2) times a day. 12/7/21  Yes Geovani Cody NP   Xarelto 20 mg tab tablet Take 1 Tablet by mouth daily (with breakfast). 9/1/21  Yes Geovani Cody NP   metoprolol succinate (TOPROL-XL) 100 mg tablet Take 1 Tablet by mouth daily. 9/1/21  Yes Geovani Cody NP   simvastatin (ZOCOR) 20 mg tablet Take 1 Tablet by mouth nightly. 9/1/21  Yes Geovani Cody NP   aspirin 81 mg chewable tablet Take 1 Tablet by mouth daily. 8/9/21  Yes Geovani Cody NP   empagliflozin (Jardiance) 25 mg tablet Take 1 Tablet by mouth daily. 6/9/21  Yes Geovani Cody NP   spironolactone (ALDACTONE) 25 mg tablet Take 1 Tablet by mouth two (2) times a day.  6/1/21  Yes Geovani Cody NP metFORMIN (GLUCOPHAGE) 850 mg tablet 850 mg two (2) times daily (with meals). 1/29/21  Yes Provider, Historical   Calcium-Cholecalciferol, D3, 500 mg(1,250mg) -400 unit tab Take 1 Tab by mouth daily. 2/10/21  Yes Jackie Mckeon NP   docusate sodium (COLACE) 100 mg capsule Take 100 mg by mouth as needed for Constipation. Yes Provider, Historical   OTHER Vitamin d3 400 iu cap  daily   Yes Provider, Historical   Bydureon BCise 2 mg/0.85 mL atIn by SubCUTAneous route every seven (7) days. 9/29/20  Yes Provider, Historical   FreeStyle Pasquale 14 Day Sensor kit apply 1 SENSOR TO THE BACK OF UPPER ARM REMOVE AND REPLACE every . ..  (REFER TO PRESCRIPTION NOTES). 7/23/20  Yes Provider, Historical   LANTUS SOLOSTAR U-100 INSULIN 100 unit/mL (3 mL) inpn 62 Units by SubCUTAneous route Before breakfast and dinner. Indications: type 2 diabetes mellitus  Patient taking differently: 60 Units by SubCUTAneous route Before breakfast and dinner. Indications: type 2 diabetes mellitus 10/30/19  Yes Jackie Mckeon NP   bumetanide (BUMEX) 1 mg tablet Take 1 Tab by mouth two (2) times a day. 5/7/19  Yes Klaudia Phillips MD   APIDRA SOLOSTAR 100 unit/mL pen by SubCUTAneous route Before breakfast, lunch, and dinner.  Indications: sliding scale 9/14/17  Yes Provider, Historical     No Known Allergies    Patient Active Problem List   Diagnosis Code    Essential hypertension I10    Hx-TIA (transient ischemic attack) Z86.73    High cholesterol E78.00    Gout M10.9    Erectile disorder due to medical condition in male N52.1    Type 2 diabetes mellitus with hyperglycemia, with long-term current use of insulin ([facility]) E11.65, Z79.4    Prostate cancer ([facility] C61    Pacemaker Z95.0    BMI 40.0-44.9, adult ([facility] Z68.41    Congestive heart failure ([address] I50.9    Pulmonary emphysema ([address] J43.9    Cardiomyopathy ([facility] I42.9    Chronic systolic CHF (congestive heart failure), NYHA class 2 ([facility]) I50.22    Noncompliance with treatment plan Z91.11    Paroxysmal atrial fibrillation (HCC) I48.0    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 Has 1 screw    HX PACEMAKER      pacemaker/defibrillator - Medtronic    HX PACEMAKER PLACEMENT      HX UROLOGICAL  2018    PNBx. TRUS Vol 54 grams. [place] 3+4 in RLM. [place] 3+3 in  cores. Dr. Diane Golden.  HX UROLOGICAL      penis implant     Family History   Problem Relation Age of Onset    Stroke Mother     Heart Attack Father      Social History     Tobacco Use    Smoking status: Former Smoker     Packs/day: 0.50     Years: 20.00     Pack years: 10.00     Types: Cigarettes     Quit date:      Years since quittin.0    Smokeless tobacco: Never Used   Substance Use Topics    Alcohol use: Yes     Alcohol/week: 1.0 standard drink     Types: 1 Shots of liquor per week     Comment: occasionally       ROS   As stated in HPI, otherwise all others negative. Objective: There were no vitals taken for this visit. General: alert, cooperative, no distress   Mental  status: normal mood, behavior, speech, dress, motor activity, and thought processes, able to follow commands   HENT: NCAT   Neck: no visualized mass   Resp: no respiratory distress   Neuro: no gross deficits   Skin: no discoloration or lesions of concern on visible areas   Psychiatric: normal affect, consistent with stated mood, no evidence of hallucinations     Additional exam findings:        We discussed the expected course, resolution and complications of the diagnosis(es) in detail. Medication risks, benefits, costs, interactions, and alternatives were discussed as indicated. I advised him to contact the office if his condition worsens, changes or fails to improve as anticipated. He expressed understanding with the diagnosis(es) and plan. Juana Freedman is a 69 y.o. male who was evaluated by a video visit encounter for concerns as above. Patient identification was verified prior to start of the visit. A caregiver was present when appropriate. Due to this being a TeleHealth encounter (During KRFUG-88 public health emergency), evaluation of the following organ systems was limited: Vitals/Constitutional/EENT/Resp/CV/GI//MS/Neuro/Skin/Heme-Lymph-Imm. Pursuant to the emergency declaration under the [address] [place] [address], [facility] waiver authority and the [employer] Act, this Virtual  Visit was conducted, with patient's (and/or legal guardian's) consent, to reduce the patient's risk of exposure to COVID-19 and provide necessary medical care. Services were provided through a video synchronous discussion virtually to substitute for in-person clinic visit. Patient and provider were located at their individual homes. An After Visit Summary was printed and given to the patient. All diagnosis have been discussed with the patient and all of the patient's questions have been answered. Follow-up and Dispositions    · Return in about 3 months (around 4/18/2022) for [address] [place] [address]. [facility], DM, DM foot, HLD, HTN, 30 min, office only. Kalen Juarez, [place] [id number] [address] [place].   Ros Corbett

## 2022-01-18 NOTE — PROGRESS NOTES
Please use this number [phone]    Did you take your medication today? Yes       1. Have you been to the ER, urgent care clinic since your last visit? Hospitalized since your last visit? No    2. Have you seen or consulted any other health care providers outside of the [address] [place] [address] since your last visit? Include any pap smears or colon screening.  Yes patient states I just came from heart doctor     3 most recent [facility] 36 Screens 9/1/2021   Little interest or pleasure in doing things Not at all   Feeling down, depressed, irritable, or hopeless Not at all   Total Score PHQ 2 0         Health Maintenance Due   Topic Date Due    Shingrix Vaccine Age 49> (1 of 2) Never done    AAA Screening 73-67 YO Male Smoking Patients  Never done    Pneumococcal 65+ years (1 of 1 - PPSV23) 02/03/2019    MICROALBUMIN Q1  09/19/2020    Flu Vaccine (1) 09/01/2021    Lipid Screen  10/06/2021    Foot Exam Q1  10/11/2021    A1C test (Diabetic or Prediabetic)  01/29/2022    Medicare Yearly Exam  02/11/2022       Learning Assessment 1/30/2020   PRIMARY LEARNER Patient   HIGHEST LEVEL OF EDUCATION - PRIMARY LEARNER  GRADUATED HIGH SCHOOL OR GED   BARRIERS PRIMARY LEARNER NONE   CO-LEARNER CAREGIVER No   PRIMARY LANGUAGE ENGLISH   LEARNER PREFERENCE PRIMARY LISTENING   ANSWERED BY Kg LARSON SELF

## 2022-01-27 PROBLEM — U07.1 COVID-19: Status: ACTIVE | Noted: 2021-09-09

## 2022-03-02 DIAGNOSIS — I10 ESSENTIAL HYPERTENSION: ICD-10-CM

## 2022-03-02 RX ORDER — METOPROLOL SUCCINATE 100 MG/1
100 TABLET, EXTENDED RELEASE ORAL DAILY
Qty: 90 TABLET | Refills: 0 | Status: SHIPPED | OUTPATIENT
Start: 2022-03-02 | End: 2022-05-23 | Stop reason: SDUPTHER

## 2022-03-18 PROBLEM — U07.1 COVID-19: Status: ACTIVE | Noted: 2021-09-09

## 2022-03-18 PROBLEM — I50.9 CONGESTIVE HEART FAILURE (HCC): Status: ACTIVE | Noted: 2019-03-12

## 2022-03-19 PROBLEM — M54.16 LEFT LUMBAR RADICULOPATHY: Status: ACTIVE | Noted: 2021-09-15

## 2022-03-19 PROBLEM — Z91.199 NONCOMPLIANCE WITH TREATMENT PLAN: Status: ACTIVE | Noted: 2019-02-09

## 2022-03-19 PROBLEM — H81.4 VERTIGO OF CENTRAL ORIGIN: Status: ACTIVE | Noted: 2019-10-30

## 2022-03-19 PROBLEM — J43.9 PULMONARY EMPHYSEMA (HCC): Status: ACTIVE | Noted: 2019-02-09

## 2022-03-19 PROBLEM — Z86.16 HISTORY OF 2019 NOVEL CORONAVIRUS DISEASE (COVID-19): Status: ACTIVE | Noted: 2021-09-15

## 2022-03-20 PROBLEM — C61 PROSTATE CANCER (HCC): Status: ACTIVE | Noted: 2018-02-08

## 2022-04-22 RX ORDER — BUMETANIDE 1 MG/1
1 TABLET ORAL 2 TIMES DAILY
Qty: 180 TABLET | Refills: 3 | OUTPATIENT
Start: 2022-04-22

## 2022-04-22 NOTE — TELEPHONE ENCOUNTER
Lyla Bennett Sadler  Subject: Refill Request     QUESTIONS   Name of Medication? Other - Bumetanide   Patient-reported dosage and instructions? 1 mg twice a day   How many days do you have left? 3   Preferred [facility]acy? [facility] [address] phone number (if available)? [phone]   Additional Information for Provider? Pt. is requesting a refill   ---------------------------------------------------------------------------   --------------   CALL BACK INFO   What is the best way for the office to contact you? OK to leave message on   voicemail   Preferred Call Back Phone Number? [phone]   ---------------------------------------------------------------------------   --------------   SCRIPT ANSWERS   Relationship to Patient?  Self

## 2022-04-25 NOTE — TELEPHONE ENCOUNTER
[facility]  Subject: Refill Request     QUESTIONS   Name of Medication? Other - Bumetanide   Patient-reported dosage and instructions? 1mg 2/day   How many days do you have left? 0   Preferred Pharmacy? [facility] [address] phone number (if available)? [phone]   Additional Information for Provider? PT has an appt on 5/11 @ 3:30pm,   please refill meds; contact#[phone]   ---------------------------------------------------------------------------   --------------   CALL BACK INFO   What is the best way for the office to contact you? OK to leave message on   voicemail   Preferred Call Back Phone Number? [phone]   ---------------------------------------------------------------------------   --------------   SCRIPT ANSWERS   Relationship to Patient?  Self

## 2022-04-26 RX ORDER — BUMETANIDE 1 MG/1
1 TABLET ORAL 2 TIMES DAILY
Qty: 60 TABLET | Refills: 0 | Status: SHIPPED | OUTPATIENT
Start: 2022-04-26 | End: 2022-05-23 | Stop reason: SDUPTHER

## 2022-05-18 ENCOUNTER — HOSPITAL ENCOUNTER (OUTPATIENT)
Dept: LAB | Age: 67
Discharge: HOME | End: 2022-05-18
Payer: MEDICARE

## 2022-05-18 DIAGNOSIS — Z79.4 TYPE 2 DIABETES MELLITUS WITH HYPERGLYCEMIA, WITH LONG-TERM CURRENT USE OF INSULIN (HCC): ICD-10-CM

## 2022-05-18 DIAGNOSIS — E11.65 TYPE 2 DIABETES MELLITUS WITH HYPERGLYCEMIA, WITH LONG-TERM CURRENT USE OF INSULIN (HCC): ICD-10-CM

## 2022-05-18 DIAGNOSIS — E78.00 HIGH CHOLESTEROL: ICD-10-CM

## 2022-05-18 DIAGNOSIS — I10 ESSENTIAL HYPERTENSION: Primary | ICD-10-CM

## 2022-05-18 LAB
ALBUMIN SERPL-MCNC: 3.6 G/DL (ref 3.4–5)
ALBUMIN/GLOB SERPL: 1.2 {RATIO} (ref 0.8–1.7)
ALP SERPL-CCNC: 78 U/L (ref 45–117)
ALT SERPL-CCNC: 48 U/L (ref 16–61)
ANION GAP SERPL CALC-SCNC: 6 MMOL/L (ref 3–18)
AST SERPL-CCNC: 23 U/L (ref 10–38)
BILIRUB SERPL-MCNC: 0.4 MG/DL (ref 0.2–1)
BUN SERPL-MCNC: 19 MG/DL (ref 7–18)
BUN/CREAT SERPL: 16 (ref 12–20)
CALCIUM SERPL-MCNC: 9.1 MG/DL (ref 8.5–10.1)
CHLORIDE SERPL-SCNC: 107 MMOL/L (ref 100–111)
CHOLEST SERPL-MCNC: 147 MG/DL
CO2 SERPL-SCNC: 32 MMOL/L (ref 21–32)
CREAT SERPL-MCNC: 1.2 MG/DL (ref 0.6–1.3)
CREAT UR-MCNC: 89 MG/DL (ref 30–125)
EST. AVERAGE GLUCOSE BLD GHB EST-MCNC: 206 MG/DL
GLOBULIN SER CALC-MCNC: 3.1 G/DL (ref 2–4)
GLUCOSE SERPL-MCNC: 263 MG/DL (ref 74–99)
HBA1C MFR BLD: 8.8 % (ref 4.2–5.6)
HDLC SERPL-MCNC: 66 MG/DL (ref 40–60)
HDLC SERPL: 2.2 {RATIO} (ref 0–5)
LDLC SERPL CALC-MCNC: 27.6 MG/DL (ref 0–100)
LIPID PROFILE,FLP: ABNORMAL
MICROALBUMIN UR-MCNC: 75.6 MG/DL (ref 0–3)
MICROALBUMIN/CREAT UR-RTO: 849 MG/G (ref 0–30)
POTASSIUM SERPL-SCNC: 3.9 MMOL/L (ref 3.5–5.5)
PROT SERPL-MCNC: 6.7 G/DL (ref 6.4–8.2)
SODIUM SERPL-SCNC: 145 MMOL/L (ref 136–145)
TRIGL SERPL-MCNC: 267 MG/DL (ref ?–150)
VLDLC SERPL CALC-MCNC: 53.4 MG/DL

## 2022-05-18 PROCEDURE — 36415 COLL VENOUS BLD VENIPUNCTURE: CPT

## 2022-05-18 PROCEDURE — 80053 COMPREHEN METABOLIC PANEL: CPT

## 2022-05-18 PROCEDURE — 82043 UR ALBUMIN QUANTITATIVE: CPT

## 2022-05-18 PROCEDURE — 80061 LIPID PANEL: CPT

## 2022-05-18 PROCEDURE — 83036 HEMOGLOBIN GLYCOSYLATED A1C: CPT

## 2022-05-23 ENCOUNTER — OFFICE VISIT (OUTPATIENT)
Dept: FAMILY MEDICINE CLINIC | Age: 67
End: 2022-05-23
Payer: MEDICARE

## 2022-05-23 VITALS
OXYGEN SATURATION: 95 % | SYSTOLIC BLOOD PRESSURE: 130 MMHG | HEART RATE: 75 BPM | DIASTOLIC BLOOD PRESSURE: 78 MMHG | TEMPERATURE: 98.2 F | HEIGHT: 70 IN | RESPIRATION RATE: 20 BRPM | BODY MASS INDEX: 41.95 KG/M2 | WEIGHT: 293 LBS

## 2022-05-23 DIAGNOSIS — Z79.4 TYPE 2 DIABETES MELLITUS WITH HYPERGLYCEMIA, WITH LONG-TERM CURRENT USE OF INSULIN (HCC): ICD-10-CM

## 2022-05-23 DIAGNOSIS — J43.8 OTHER EMPHYSEMA (HCC): ICD-10-CM

## 2022-05-23 DIAGNOSIS — I48.0 PAROXYSMAL ATRIAL FIBRILLATION (HCC): ICD-10-CM

## 2022-05-23 DIAGNOSIS — I50.22 CHRONIC SYSTOLIC CHF (CONGESTIVE HEART FAILURE), NYHA CLASS 2 (HCC): ICD-10-CM

## 2022-05-23 DIAGNOSIS — E11.65 TYPE 2 DIABETES MELLITUS WITH HYPERGLYCEMIA, WITH LONG-TERM CURRENT USE OF INSULIN (HCC): ICD-10-CM

## 2022-05-23 DIAGNOSIS — I10 ESSENTIAL HYPERTENSION: ICD-10-CM

## 2022-05-23 DIAGNOSIS — R27.0 ATAXIA: ICD-10-CM

## 2022-05-23 DIAGNOSIS — E78.00 HIGH CHOLESTEROL: ICD-10-CM

## 2022-05-23 DIAGNOSIS — R41.3 MEMORY CHANGES: ICD-10-CM

## 2022-05-23 DIAGNOSIS — J44.9 CHRONIC OBSTRUCTIVE PULMONARY DISEASE, UNSPECIFIED COPD TYPE (HCC): ICD-10-CM

## 2022-05-23 DIAGNOSIS — Z00.00 MEDICARE ANNUAL WELLNESS VISIT, SUBSEQUENT: Primary | ICD-10-CM

## 2022-05-23 PROCEDURE — G8417 CALC BMI ABV UP PARAM F/U: HCPCS | Performed by: NURSE PRACTITIONER

## 2022-05-23 PROCEDURE — 1101F PT FALLS ASSESS-DOCD LE1/YR: CPT | Performed by: NURSE PRACTITIONER

## 2022-05-23 PROCEDURE — 3017F COLORECTAL CA SCREEN DOC REV: CPT | Performed by: NURSE PRACTITIONER

## 2022-05-23 PROCEDURE — 1123F ACP DISCUSS/DSCN MKR DOCD: CPT | Performed by: NURSE PRACTITIONER

## 2022-05-23 PROCEDURE — G0439 PPPS, SUBSEQ VISIT: HCPCS | Performed by: NURSE PRACTITIONER

## 2022-05-23 PROCEDURE — 2022F DILAT RTA XM EVC RTNOPTHY: CPT | Performed by: NURSE PRACTITIONER

## 2022-05-23 PROCEDURE — G8752 SYS BP LESS 140: HCPCS | Performed by: NURSE PRACTITIONER

## 2022-05-23 PROCEDURE — G8754 DIAS BP LESS 90: HCPCS | Performed by: NURSE PRACTITIONER

## 2022-05-23 PROCEDURE — 99214 OFFICE O/P EST MOD 30 MIN: CPT | Performed by: NURSE PRACTITIONER

## 2022-05-23 PROCEDURE — G8427 DOCREV CUR MEDS BY ELIG CLIN: HCPCS | Performed by: NURSE PRACTITIONER

## 2022-05-23 PROCEDURE — 3052F HG A1C>EQUAL 8.0%<EQUAL 9.0%: CPT | Performed by: NURSE PRACTITIONER

## 2022-05-23 PROCEDURE — G8510 SCR DEP NEG, NO PLAN REQD: HCPCS | Performed by: NURSE PRACTITIONER

## 2022-05-23 PROCEDURE — G8536 NO DOC ELDER MAL SCRN: HCPCS | Performed by: NURSE PRACTITIONER

## 2022-05-23 RX ORDER — LETROZOLE 2.5 MG/1
2.5 TABLET, FILM COATED ORAL
COMMUNITY

## 2022-05-23 RX ORDER — SIMVASTATIN 20 MG/1
20 TABLET, FILM COATED ORAL
Qty: 90 TABLET | Refills: 3 | Status: SHIPPED
Start: 2022-05-23 | End: 2022-09-28 | Stop reason: DRUGHIGH

## 2022-05-23 RX ORDER — SPIRONOLACTONE 25 MG/1
25 TABLET ORAL 2 TIMES DAILY
Qty: 180 TABLET | Refills: 3 | Status: CANCELLED | OUTPATIENT
Start: 2022-05-23

## 2022-05-23 RX ORDER — RIVAROXABAN 20 MG/1
20 TABLET, FILM COATED ORAL
Qty: 90 TABLET | Refills: 3 | Status: SHIPPED | OUTPATIENT
Start: 2022-05-23

## 2022-05-23 RX ORDER — BUMETANIDE 1 MG/1
1 TABLET ORAL 2 TIMES DAILY
Qty: 60 TABLET | Refills: 2 | Status: SHIPPED | OUTPATIENT
Start: 2022-05-23 | End: 2022-09-28 | Stop reason: SDUPTHER

## 2022-05-23 RX ORDER — TAMSULOSIN HYDROCHLORIDE 0.4 MG/1
0.4 CAPSULE ORAL DAILY
Qty: 90 CAPSULE | Refills: 0 | Status: CANCELLED | OUTPATIENT
Start: 2022-05-23

## 2022-05-23 RX ORDER — LOSARTAN POTASSIUM 100 MG/1
100 TABLET ORAL DAILY
Qty: 90 TABLET | Refills: 0 | Status: SHIPPED | OUTPATIENT
Start: 2022-05-23 | End: 2022-09-28

## 2022-05-23 RX ORDER — METOPROLOL SUCCINATE 100 MG/1
100 TABLET, EXTENDED RELEASE ORAL DAILY
Qty: 90 TABLET | Refills: 0 | Status: SHIPPED | OUTPATIENT
Start: 2022-05-23 | End: 2022-09-28 | Stop reason: SDUPTHER

## 2022-05-23 RX ORDER — FLUTICASONE PROPIONATE AND SALMETEROL XINAFOATE 115; 21 UG/1; UG/1
2 AEROSOL, METERED RESPIRATORY (INHALATION) 2 TIMES DAILY
Qty: 1 EACH | Refills: 3 | Status: SHIPPED | OUTPATIENT
Start: 2022-05-23 | End: 2022-08-08 | Stop reason: SDUPTHER

## 2022-05-23 RX ORDER — METFORMIN HYDROCHLORIDE 850 MG/1
850 TABLET ORAL 2 TIMES DAILY WITH MEALS
Status: CANCELLED | OUTPATIENT
Start: 2022-05-23

## 2022-05-23 RX ORDER — INSULIN GLULISINE 100 [IU]/ML
INJECTION, SOLUTION SUBCUTANEOUS
Status: CANCELLED | OUTPATIENT
Start: 2022-05-23

## 2022-05-23 RX ORDER — INSULIN GLARGINE 100 [IU]/ML
62 INJECTION, SOLUTION SUBCUTANEOUS
Qty: 15 ADJUSTABLE DOSE PRE-FILLED PEN SYRINGE | Refills: 2 | Status: CANCELLED | OUTPATIENT
Start: 2022-05-23

## 2022-05-23 NOTE — PROGRESS NOTES
Room 8     Patient states I  Need all of my medication refilled     When asked if patient has any concerns he would like to address with MILADY Pedro patient states yes I am having shortness of breathe and patient wife states he is starting to shuffle. Did patient bring someone? Yes: Comment: Mrs. Marilu Soto     Did the patient have DME equipment? No     Did you take your medication today? Yes       1. \"Have you been to the ER, urgent care clinic since your last visit? Hospitalized since your last visit? \" No    2. \"Have you seen or consulted any other health care providers outside of the [address] [place] [address] since your last visit? \" No     3. For patients aged 39-70: Has the patient had a colonoscopy / FIT/ Cologuard? Yes - no Care Gap present      If the patient is female:    4. For patients aged 41-77: Has the patient had a mammogram within the past 2 years? NA - based on age or sex      11. For patients aged 21-65: Has the patient had a pap smear?  NA - based on age or sex        1 most recent PHQ Screens 5/23/2022   Little interest or pleasure in doing things Not at all   Feeling down, depressed, irritable, or hopeless Not at all   Total Score PHQ 2 0         Health Maintenance Due   Topic Date Due    Shingrix Vaccine Age 49> (1 of 2) Never done    Pneumococcal 65+ years (2 - PCV) 02/03/2015    AAA Screening 73-67 YO Male Smoking Patients  Never done    Foot Exam Q1  10/11/2021    Medicare Yearly Exam  02/11/2022       Learning Assessment 1/30/2020   PRIMARY LEARNER Patient   HIGHEST LEVEL OF EDUCATION - PRIMARY LEARNER  GRADUATED HIGH SCHOOL OR GED   BARRIERS PRIMARY LEARNER NONE   CO-LEARNER CAREGIVER No   PRIMARY LANGUAGE ENGLISH   LEARNER PREFERENCE PRIMARY LISTENING   ANSWERED BY Nate Liriano   RELATIONSHIP SELF

## 2022-05-23 NOTE — PATIENT INSTRUCTIONS
Medicare Wellness Visit, Male    The best way to live healthy is to have a lifestyle where you eat a well-balanced diet, exercise regularly, limit alcohol use, and quit all forms of tobacco/nicotine, if applicable. Regular preventive services are another way to keep healthy. Preventive services (vaccines, screening tests, monitoring & exams) can help personalize your care plan, which helps you manage your own care. Screening tests can find health problems at the earliest stages, when they are easiest to treat. [facility] follows the current, evidence-based guidelines published by the [facility] ([facility]) when recommending preventive services for our patients. Because we follow these guidelines, sometimes recommendations change over time as research supports it. (For example, a prostate screening blood test is no longer routinely recommended for men with no symptoms). Of course, you and your doctor may decide to screen more often for some diseases, based on your risk and co-morbidities (chronic disease you are already diagnosed with). Preventive services for you include:  - Medicare offers their members a free annual wellness visit, which is time for you and your primary care provider to discuss and plan for your preventive service needs. Take advantage of this benefit every year!  -All adults over age 72 should receive the recommended pneumonia vaccines. Current USPSTF guidelines recommend a series of two vaccines for the best pneumonia protection.   -All adults should have a flu vaccine yearly and tetanus vaccine every 10 years.  -All adults age 48 and older should receive the shingles vaccines (series of two vaccines).        -All adults age 38-68 who are overweight should have a diabetes screening test once every three years.   -Other screening tests & preventive services for persons with diabetes include: an eye exam to screen for diabetic retinopathy, a kidney function test, a foot exam, and stricter control over your cholesterol.   -Cardiovascular screening for adults with routine risk involves an electrocardiogram (ECG) at intervals determined by the provider.   -Colorectal cancer screening should be done for adults age 54-65 with no increased risk factors for colorectal cancer. There are a number of acceptable methods of screening for this type of cancer. Each test has its own benefits and drawbacks. Discuss with your provider what is most appropriate for you during your annual wellness visit. The different tests include: colonoscopy (considered the best screening method), a fecal occult blood test, a fecal DNA test, and sigmoidoscopy.  -All adults born between [facility] should be screened once for Hepatitis C.  -An Abdominal Aortic Aneurysm (AAA) Screening is recommended for men age 73-68 who has ever smoked in their lifetime.      Here is a list of your current Health Maintenance items (your personalized list of preventive services) with a due date:  Health Maintenance Due   Topic Date Due    Shingles Vaccine (1 of 2) Never done    Pneumococcal Vaccine (2 - PCV) 02/03/2015    AAA Screening  Never done    Diabetic Foot Care  10/11/2021

## 2022-05-23 NOTE — PROGRESS NOTES
[address] [place] [address]               [phone]      Jose Serrano is a 69 y.o. male and presents with Follow Up Chronic Condition, Cholesterol Problem, Diabetes, Annual Wellness Visit, and Medication Refill       Assessment/Plan:    Diagnoses and all orders for this visit:    1. Medicare annual wellness visit, subsequent  Completed today to include ETOH and depression screening and Healthcare decision maker verified   2. Essential hypertension  -     metoprolol succinate (TOPROL-XL) 100 mg tablet; Take 1 Tablet by mouth daily. -     losartan (COZAAR) 100 mg tablet; Take 1 Tablet by mouth daily. Endorses medication compliance, Refill provided and Blood pressure stable, continue metoprolol, losartan, spironolactone at same dose   3. Type 2 diabetes mellitus with hyperglycemia, with long-term current use of insulin ([facility]  managed by endocrinology, defer care to them   4. High cholesterol  -     simvastatin (ZOCOR) 20 mg tablet; Take 1 Tablet by mouth nightly. Endorses medication compliance, Refill provided and Denies abdominal pain or symptoms of myalgia   5. Ataxia  -     REFERRAL TO PHYSICAL THERAPY  States he feels like his gait is off, he stumbles, wife thinks he shuffles his feet  Refer to PT for further evaluation and mangament  6. Memory changes  -     REFERRAL TO NEUROPSYCHOLOGY  Concerned about feelings of brain fog and forgetfulness  Referral placed  7. Chronic obstructive pulmonary disease, unspecified COPD type ([facility]  -     tiotropium (SPIRIVA) 18 mcg inhalation capsule; Take 1 Capsule by inhalation daily. Refills provided  8. Other emphysema (HCC)  -     fluticasone propion-salmeteroL (Advair HFA) 115-21 mcg/actuation inhaler; Take 2 Puffs by inhalation two (2) times a day. Refills provided  9. Paroxysmal atrial fibrillation (HCC)  -     Xarelto 20 mg tab tablet; Take 1 Tablet by mouth daily (with breakfast). Refills provided  10.  Chronic systolic CHF (congestive heart failure), NYHA class 2 (HCC)  -     bumetanide (BUMEX) 1 mg tablet; Take 1 Tablet by mouth two (2) times a day. Refills provided  Other orders  -     ipratropium-albuteroL (Combivent Respimat)  mcg/actuation inhaler; Take 1 Puff by inhalation every six (6) hours as needed for Wheezing or Shortness of Breath. labs at next vist    Follow-up and Dispositions    · Return in about 4 months (around 9/23/2022) for DM, DM foot, HLD, HTN, 30 min, office only. Health Maintenance:   Health Maintenance   Topic Date Due    Shingrix Vaccine Age 49> (1 of 2) Never done    Pneumococcal 65+ years (2 - PCV) 02/03/2015    AAA Screening 73-67 YO Male Smoking Patients  Never done    Foot Exam Q1  10/11/2021    Flu Vaccine (Season Ended) 09/01/2022    Eye Exam Retinal or Dilated  02/02/2023    A1C test (Diabetic or Prediabetic)  05/18/2023    MICROALBUMIN Q1  05/18/2023    Lipid Screen  05/18/2023    Depression Screen  05/23/2023    Medicare Yearly Exam  05/24/2023    DTaP/Tdap/Td series (2 - Td or Tdap) 03/31/2030    Colorectal Cancer Screening Combo  12/17/2030    Hepatitis C Screening  Completed    COVID-19 Vaccine  Completed        Subjective:    Labs obtained prior to visit? YES  Reviewed with patient?  Yes    Memory Decline  Cannot remember like he used to  First noticed about 6 months ago  Forgets names of people he knows  Wife states he has moments of brain fog: once she gives him a trigger he remembers  Had COVID 9 months ago  Feels like this probable started after covid  He would like further workup    Gait changes  Seems like his feet will stumble  Happens when wearing shoes  Onset: about 6-7 months ago  Has not fallen or almost fallen with walking  Did fall once about 6 months ago because he missed a step on the ladder  Does not stand up straight when he walks      DMII-   Patient reports medication compliance Daily  Diabetic diet compliance most of the time  Patient monitors blood sugars regularly cgm   Reports am fasting sugars range averging 130-220   Denies hypoglycemic episodes yes  Denies polyuria, polydipsia, paraesthesia, vision changes? yes  Engaging in daily exercise? No     Diabetic Foot and Eye Exam HM Status   Topic Date Due    Diabetic Foot Care  10/11/2021    Eye Exam  02/02/2023     Hemoglobin A1c   Date Value Ref Range Status   05/18/2022 8.8 (H) 4.2 - 5.6 % Final     Comment:     (NOTE)  HbA1C Interpretive Ranges  <5.7              Normal  5.7 - 6.4         Consider Prediabetes  >6.5              Consider Diabetes       Hemoglobin A1c, External   Date Value Ref Range Status   01/29/2021 7.4 % Final   ]  Creatinine, urine   Date Value Ref Range Status   05/18/2022 89.00 30 - 125 mg/dL Final     Microalbumin/Creat ratio (mg/g creat)   Date Value Ref Range Status   05/18/2022 849 (H) 0 - 30 mg/g Final   09/19/2019 16 0 - 30 mg/g Final     Key Antihyperglycemic Medications             empagliflozin (Jardiance) 25 mg tablet (Taking) Take 1 Tablet by mouth daily. metFORMIN (GLUCOPHAGE) 850 mg tablet (Taking) 850 mg two (2) times daily (with meals). Bydureon BCise 2 mg/0.85 mL atIn (Taking) by SubCUTAneous route every seven (7) days. LANTUS SOLOSTAR U-100 INSULIN 100 unit/mL (3 mL) inpn (Taking) 62 Units by SubCUTAneous route Before breakfast and dinner. Indications: type 2 diabetes mellitus    APIDRA SOLOSTAR 100 unit/mL pen (Taking) by SubCUTAneous route Before breakfast, lunch, and dinner. Indications: sliding scale        Hypertension:  Visit Vitals  /78   Pulse 75   Temp 98.2 °F (36.8 °C) (Temporal)   Resp 20   Ht 5' 10\" (1.778 m)   Wt 293 lb (132.9 kg)   SpO2 95%   BMI 42.04 kg/m²      Patient reports taking medications as instructed. yes   Medication side effects noted. no  Headache upon wakening. no   Home BP monitoring: Does not check  Do you experience chest pain/pressure or SOB with exertion? no  Maintain a low Sodium diet?  yes  Key CAD CHF Meds             bumetanide (BUMEX) 1 mg tablet (Taking) Take 1 Tablet by mouth two (2) times a day. Xarelto 20 mg tab tablet (Taking) Take 1 Tablet by mouth daily (with breakfast). simvastatin (ZOCOR) 20 mg tablet (Taking) Take 1 Tablet by mouth nightly. metoprolol succinate (TOPROL-XL) 100 mg tablet (Taking) Take 1 Tablet by mouth daily. losartan (COZAAR) 100 mg tablet (Taking) Take 1 Tablet by mouth daily. aspirin 81 mg chewable tablet (Taking) Take 1 Tablet by mouth daily. spironolactone (ALDACTONE) 25 mg tablet (Taking) Take 1 Tablet by mouth two (2) times a day. BUN   Date Value Ref Range Status   05/18/2022 19 (H) 7.0 - 18 MG/DL Final     Creatinine   Date Value Ref Range Status   05/18/2022 1.20 0.6 - 1.3 MG/DL Final     GFR est AA   Date Value Ref Range Status   05/18/2022 >60 >60 ml/min/1.73m2 Final     Potassium   Date Value Ref Range Status   05/18/2022 3.9 3.5 - 5.5 mmol/L Final       HLD:  Has been compliant with meds  Yes  Compliant with low-fat diet. most of the time    Denies myalgias or other side effects. yes  The 10-year ASCVD risk score (Kristina Calderón., et al., 2013) is: 28.4%    Cholesterol, total   Date Value Ref Range Status   05/18/2022 147 <200 MG/DL Final     Triglyceride   Date Value Ref Range Status   05/18/2022 267 (H) <150 MG/DL Final     Comment:     The drugs N-acetylcysteine (NAC) and  Metamiszole have been found to cause falsely  low results in this chemical assay. Please  be sure to submit blood samples obtained  BEFORE administration of either of these  drugs to assure correct results. HDL Cholesterol   Date Value Ref Range Status   05/18/2022 66 (H) 40 - 60 MG/DL Final     LDL, calculated   Date Value Ref Range Status   05/18/2022 27.6 0 - 100 MG/DL Final   ]  Key Antihyperlipidemia Meds             simvastatin (ZOCOR) 20 mg tablet (Taking) Take 1 Tablet by mouth nightly. ROS:     ROS  As stated in HPI, otherwise all others negative. The problem list was updated as a part of today's visit. Patient Active Problem List   Diagnosis Code    Essential hypertension I10    Hx-TIA (transient ischemic attack) Z86.73    High cholesterol E78.00    Gout M10.9    Erectile disorder due to medical condition in male N52.1    Type 2 diabetes mellitus with hyperglycemia, with long-term current use of insulin (HCC) E11.65, Z79.4    Prostate cancer ([facility] C61    Pacemaker Z95.0    BMI 40.0-44.9, adult ([facility] Z68.41    Congestive heart failure (HCC) I50.9    Pulmonary emphysema (HCC) J43.9    Cardiomyopathy ([facility]) I42.9    Chronic systolic CHF (congestive heart failure), NYHA class 2 ([facility]) I50.22    Noncompliance with treatment plan Z91.11    Paroxysmal atrial fibrillation (HCC) I48.0    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 SH:  Social History     Tobacco Use    Smoking status: Former Smoker     Packs/day: 0.50     Years: 20.00     Pack years: 10.00     Types: Cigarettes     Quit date:      Years since quittin.4    Smokeless tobacco: Never Used   Vaping Use    Vaping Use: Never used   Substance Use Topics    Alcohol use: Yes     Alcohol/week: 1.0 standard drink     Types: 1 Shots of liquor per week     Comment: occasionally    Drug use: No       Medications/Allergies:  Current Outpatient Medications on File Prior to Visit   Medication Sig Dispense Refill    letrozole (FEMARA) 2.5 mg tablet Take 2.5 mg by mouth.  acetaminophen (TYLENOL) 325 mg tablet Take 650 mg by mouth every six (6) hours as needed.  tamsulosin (FLOMAX) 0.4 mg capsule Take 1 Capsule by mouth daily. 90 Capsule 0    aspirin 81 mg chewable tablet Take 1 Tablet by mouth daily. 90 Tablet 3    empagliflozin (Jardiance) 25 mg tablet Take 1 Tablet by mouth daily. 90 Tablet 3    spironolactone (ALDACTONE) 25 mg tablet Take 1 Tablet by mouth two (2) times a day. 180 Tablet 3    metFORMIN (GLUCOPHAGE) 850 mg tablet 850 mg two (2) times daily (with meals).  Calcium-Cholecalciferol, D3, 500 mg(1,250mg) -400 unit tab Take 1 Tab by mouth daily. 90 Tab 1    docusate sodium (COLACE) 100 mg capsule Take 100 mg by mouth as needed for Constipation.  OTHER Vitamin d3 400 iu cap  daily      Bydureon BCise 2 mg/0.85 mL atIn by SubCUTAneous route every seven (7) days.  FreeStyle Pasquale 14 Day Sensor kit apply 1 SENSOR TO THE BACK OF UPPER ARM REMOVE AND REPLACE every . ..  (REFER TO PRESCRIPTION NOTES).  LANTUS SOLOSTAR U-100 INSULIN 100 unit/mL (3 mL) inpn 62 Units by SubCUTAneous route Before breakfast and dinner. Indications: type 2 diabetes mellitus (Patient taking differently: 60 Units by SubCUTAneous route Before breakfast and dinner. Indications: type 2 diabetes mellitus) 15 Adjustable Dose Pre-filled Pen Syringe 2    APIDRA SOLOSTAR 100 unit/mL pen by SubCUTAneous route Before breakfast, lunch, and dinner. Indications: sliding scale       No current facility-administered medications on file prior to visit. No Known Allergies    Objective:  Visit Vitals  /78   Pulse 75   Temp 98.2 °F (36.8 °C) (Temporal)   Resp 20   Ht 5' 10\" (1.778 m)   Wt 293 lb (132.9 kg)   SpO2 95%   BMI 42.04 kg/m²    Body mass index is 42.04 kg/m².     Physical assessment  Physical Exam      Labwork and Ancillary Studies:    CBC w/Diff  Lab Results   Component Value Date/Time    WBC 5.8 11/03/2020 10:44 AM    HGB 14.9 11/03/2020 10:44 AM    PLATELET 220 03/58/5918 10:44 AM         Basic Metabolic Profile  Lab Results   Component Value Date/Time    Sodium 145 05/18/2022 01:53 PM    Potassium 3.9 05/18/2022 01:53 PM    Chloride 107 05/18/2022 01:53 PM    CO2 32 05/18/2022 01:53 PM    Anion gap 6 05/18/2022 01:53 PM    Glucose 263 (H) 05/18/2022 01:53 PM    BUN 19 (H) 05/18/2022 01:53 PM    Creatinine 1.20 05/18/2022 01:53 PM    BUN/Creatinine ratio 16 05/18/2022 01:53 PM    GFR est AA >60 05/18/2022 01:53 PM    GFR est non-AA >60 05/18/2022 01:53 PM    Calcium 9.1 05/18/2022 01:53 PM        Cholesterol  Lab Results   Component Value Date/Time    Cholesterol, total 147 05/18/2022 01:53 PM    HDL Cholesterol 66 (H) 05/18/2022 01:53 PM    LDL, calculated 27.6 05/18/2022 01:53 PM    Triglyceride 267 (H) 05/18/2022 01:53 PM    CHOL/HDL Ratio 2.2 05/18/2022 01:53 PM           I have discussed the diagnosis with the patient and the intended plan as seen in the above orders. The patient has received an After-Visit Summary and questions were answered concerning future plans. An After Visit Summary was printed and given to the patient. All diagnosis have been discussed with the patient and all of the patient's questions have been answered. Follow-up and Dispositions    · Return in about 4 months (around 9/23/2022) for DM, DM foot, HLD, HTN, 30 min, office only. Estela Pulido [facility]  [address] [facility]   [address] [facility] [address] [address]      This is the Subsequent Medicare Annual Wellness Exam, performed 12 months or more after the Initial AWV or the last Subsequent AWV    I have reviewed the patient's medical history in detail and updated the computerized patient record. Assessment/Plan   Education and counseling provided:  Are appropriate based on today's review and evaluation    1. Medicare annual wellness visit, subsequent  2. Essential hypertension  -     metoprolol succinate (TOPROL-XL) 100 mg tablet; Take 1 Tablet by mouth daily. , Normal, Disp-90 Tablet, R-0  -     losartan (COZAAR) 100 mg tablet; Take 1 Tablet by mouth daily. , Normal, Disp-90 Tablet, R-0  3. Type 2 diabetes mellitus with hyperglycemia, with long-term current use of insulin ([address]  4.  High cholesterol  -     simvastatin (ZOCOR) 20 mg tablet; Take 1 Tablet by mouth nightly., Normal, Disp-90 Tablet, R-3  5. Ataxia  -     REFERRAL TO PHYSICAL THERAPY  6. Memory changes  -     REFERRAL TO NEUROPSYCHOLOGY  7. Chronic obstructive pulmonary disease, unspecified COPD type ([facility]  -     tiotropium (SPIRIVA) 18 mcg inhalation capsule; Take 1 Capsule by inhalation daily. , Normal, Disp-30 Capsule, R-2  8. Other emphysema (HCC)  -     fluticasone propion-salmeteroL (Advair HFA) 115-21 mcg/actuation inhaler; Take 2 Puffs by inhalation two (2) times a day., Normal, Disp-1 Each, R-3  9. Paroxysmal atrial fibrillation (HCC)  -     Xarelto 20 mg tab tablet; Take 1 Tablet by mouth daily (with breakfast). , Normal, Disp-90 Tablet, R-3, HOLLY  10. Chronic systolic CHF (congestive heart failure), NYHA class 2 ([facility])  -     bumetanide (BUMEX) 1 mg tablet; Take 1 Tablet by mouth two (2) times a day., Normal, Disp-60 Tablet, R-2       Depression Risk Factor Screening     3 most recent PHQ Screens 5/23/2022   Little interest or pleasure in doing things Not at all   Feeling down, depressed, irritable, or hopeless Not at all   Total Score PHQ 2 0       Alcohol & Drug Abuse Risk Screen    Do you average more than 1 drink per night or more than 7 drinks a week: No    In the past three months have you have had more than 4 drinks containing alcohol on one occasion: No          Functional Ability and Level of Safety    Hearing: Hearing is good. Activities of Daily Living: The home contains: handrails  Patient does total self care      Ambulation: with mild difficulty and states felicity clumsy     Fall Risk:  Fall Risk Assessment, last 12 mths 9/1/2021   Able to walk? Yes   Fall in past 12 months? 0   Do you feel unsteady?  1   Are you worried about falling -      Abuse Screen:  Patient is not abused       Cognitive Screening    Has your family/caregiver stated any concerns about your memory: no     Cognitive Screening: Abnormal - three word recall: 1/3    Health Maintenance Due     Health Maintenance Due   Topic Date Due    Shingrix Vaccine Age 49> (1 of 2) Never done    Pneumococcal 65+ years (2 - PCV) 02/03/2015    AAA Screening 73-69 YO Male Smoking Patients  Never done    Foot Exam Q1  10/11/2021       Patient Care Team   Patient Care Team:  Benedict Donaldson NP as PCP - General (Nurse Practitioner)  Benedict Donaldson NP as PCP - [facility]  Andre Membreno MD as Physician (Cardiovascular Disease Physician)    History     Patient Active Problem List   Diagnosis Code    Essential hypertension I10    Hx-TIA (transient ischemic attack) Z86.73    High cholesterol E78.00    Gout M10.9    Erectile disorder due to medical condition in male N52.1    Type 2 diabetes mellitus with hyperglycemia, with long-term current use of insulin (HCC) E11.65, Z79.4    Prostate cancer ([address] C61    Pacemaker Z95.0    BMI 40.0-44.9, adult ([address] Z68.41    Congestive heart failure ([address] I50.9    Pulmonary emphysema ([address] J43.9    Cardiomyopathy ([address] I42.9    Chronic systolic CHF (congestive heart failure), NYHA class 2 ([address] I50.22    Noncompliance with treatment plan Z91.11    Paroxysmal atrial fibrillation (HCC) I48.0    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    Vertigo of central origin H81.4    History of 2019 novel coronavirus disease (COVID-19) Z86.16    Left lumbar radiculopathy M54.16    COVID-19 U07.1     Past Medical History:   Diagnosis Date    A-fib ([address]     COPD (chronic obstructive pulmonary disease) ([address]     Diabetes ([address]     Elevated PSA     Erectile disorder due to medical condition in male    [place]lass Gout     High cholesterol     Hx-TIA (transient ischemic attack)     Hypertension     Pacemaker     Prostate cancer ([address] 02/08/2018    Clinical staging T1c [place] score 7 and 6. pre-bx PSA 4.65 ng/mL.       Past Surgical History:   Procedure Laterality Date    COLONOSCOPY N/A 12/17/2020    COLONOSCOPY WITH COLD SNARED POLYPECTOMY performed by Sancho Gray MD at [facility] HX CIRCUMCISION  2014    HX COLONOSCOPY      HX ORTHOPAEDIC      right ankle surgery with pins    HX OTHER SURGICAL Right 2011    fractured ankle. Has 1 screw    HX PACEMAKER      pacemaker/defibrillator - [employer]onic    HX PACEMAKER PLACEMENT  2012    HX UROLOGICAL  02/08/2018    PNBx. TRUS Vol 54 grams. [place] 3+4 in RLM. [place] 3+3 in 5/12 cores. Dr. Emily Jasso.  HX UROLOGICAL      penis implant     Current Outpatient Medications   Medication Sig Dispense Refill    bumetanide (BUMEX) 1 mg tablet Take 1 Tablet by mouth two (2) times a day. 60 Tablet 2    Xarelto 20 mg tab tablet Take 1 Tablet by mouth daily (with breakfast). 90 Tablet 3    tiotropium (SPIRIVA) 18 mcg inhalation capsule Take 1 Capsule by inhalation daily. 30 Capsule 2    simvastatin (ZOCOR) 20 mg tablet Take 1 Tablet by mouth nightly. 90 Tablet 3    metoprolol succinate (TOPROL-XL) 100 mg tablet Take 1 Tablet by mouth daily. 90 Tablet 0    losartan (COZAAR) 100 mg tablet Take 1 Tablet by mouth daily. 90 Tablet 0    ipratropium-albuteroL (Combivent Respimat)  mcg/actuation inhaler Take 1 Puff by inhalation every six (6) hours as needed for Wheezing or Shortness of Breath. 4 g 2    letrozole (FEMARA) 2.5 mg tablet Take 2.5 mg by mouth.  fluticasone propion-salmeteroL (Advair HFA) 115-21 mcg/actuation inhaler Take 2 Puffs by inhalation two (2) times a day. 1 Each 3    acetaminophen (TYLENOL) 325 mg tablet Take 650 mg by mouth every six (6) hours as needed.  tamsulosin (FLOMAX) 0.4 mg capsule Take 1 Capsule by mouth daily. 90 Capsule 0    aspirin 81 mg chewable tablet Take 1 Tablet by mouth daily. 90 Tablet 3    empagliflozin (Jardiance) 25 mg tablet Take 1 Tablet by mouth daily. 90 Tablet 3    spironolactone (ALDACTONE) 25 mg tablet Take 1 Tablet by mouth two (2) times a day.  180 Tablet 3    metFORMIN (GLUCOPHAGE) 850 mg tablet 850 mg two (2) times daily (with meals).  Calcium-Cholecalciferol, D3, 500 mg(1,250mg) -400 unit tab Take 1 Tab by mouth daily. 90 Tab 1    docusate sodium (COLACE) 100 mg capsule Take 100 mg by mouth as needed for Constipation.  OTHER Vitamin d3 400 iu cap  daily      Bydureon BCise 2 mg/0.85 mL atIn by SubCUTAneous route every seven (7) days.  FreeStyle Pasquale 14 Day Sensor kit apply 1 SENSOR TO THE BACK OF UPPER ARM REMOVE AND REPLACE every . ..  (REFER TO PRESCRIPTION NOTES).  LANTUS SOLOSTAR U-100 INSULIN 100 unit/mL (3 mL) inpn 62 Units by SubCUTAneous route Before breakfast and dinner. Indications: type 2 diabetes mellitus (Patient taking differently: 60 Units by SubCUTAneous route Before breakfast and dinner. Indications: type 2 diabetes mellitus) 15 Adjustable Dose Pre-filled Pen Syringe 2    APIDRA SOLOSTAR 100 unit/mL pen by SubCUTAneous route Before breakfast, lunch, and dinner. Indications: sliding scale       No Known Allergies    Family History   Problem Relation Age of Onset    Stroke Mother     Heart Attack Father      Social History     Tobacco Use    Smoking status: Former Smoker     Packs/day: 0.50     Years: 20.00     Pack years: 10.00     Types: Cigarettes     Quit date:      Years since quittin.4    Smokeless tobacco: Never Used   Substance Use Topics    Alcohol use:  Yes     Alcohol/week: 1.0 standard drink     Types: 1 Shots of liquor per week     Comment: occasionally         Zenaida Valdez NP

## 2022-05-26 ENCOUNTER — PATIENT MESSAGE (OUTPATIENT)
Dept: NEUROLOGY | Age: 67
End: 2022-05-26

## 2022-06-09 ENCOUNTER — HOSPITAL ENCOUNTER (OUTPATIENT)
Dept: PHYSICAL THERAPY | Age: 67
End: 2022-06-09
Attending: NURSE PRACTITIONER

## 2022-07-28 DIAGNOSIS — M1A.9XX0 CHRONIC GOUT INVOLVING TOE OF RIGHT FOOT WITHOUT TOPHUS, UNSPECIFIED CAUSE: ICD-10-CM

## 2022-07-29 RX ORDER — ALLOPURINOL 100 MG/1
100 TABLET ORAL DAILY
Qty: 90 TABLET | Refills: 0 | Status: SHIPPED | OUTPATIENT
Start: 2022-07-29 | End: 2022-08-01 | Stop reason: SDUPTHER

## 2022-08-01 DIAGNOSIS — C61 PROSTATE CANCER (HCC): ICD-10-CM

## 2022-08-01 DIAGNOSIS — M1A.9XX0 CHRONIC GOUT INVOLVING TOE OF RIGHT FOOT WITHOUT TOPHUS, UNSPECIFIED CAUSE: ICD-10-CM

## 2022-08-01 RX ORDER — ALLOPURINOL 100 MG/1
100 TABLET ORAL DAILY
Qty: 90 TABLET | Refills: 0 | Status: SHIPPED | OUTPATIENT
Start: 2022-08-01 | End: 2022-09-28 | Stop reason: SDUPTHER

## 2022-08-01 RX ORDER — TAMSULOSIN HYDROCHLORIDE 0.4 MG/1
0.4 CAPSULE ORAL DAILY
Qty: 90 CAPSULE | Refills: 0 | Status: SHIPPED | OUTPATIENT
Start: 2022-08-01 | End: 2022-09-28 | Stop reason: SDUPTHER

## 2022-08-01 NOTE — TELEPHONE ENCOUNTER
Message  Received: Today [address] [facility], [address] [address] [place] [facility]  Subject: Refill Request     QUESTIONS   Name of Medication? allopurinoL (ZYLOPRIM) 100 mg tablet   Patient-reported dosage and instructions? 100mg 1xday   How many days do you have left? 0   Preferred Pharmacy? [facility] [address] phone number (if available)? [phone]   ---------------------------------------------------------------------------   --------------,   Name of Medication? tamsulosin (FLOMAX) 0.4 mg capsule   Patient-reported dosage and instructions? 0.4mg 1xday   How many days do you have left? 3   Preferred Pharmacy? [facility] [address] phone number (if available)? [phone]   Additional Information for Provider? contact# [phone]   ---------------------------------------------------------------------------   --------------   Cristi Quiroz INFO   What is the best way for the office to contact you? OK to leave message on   voicemail   Preferred Call Back Phone Number? [phone]   ---------------------------------------------------------------------------   --------------   SCRIPT ANSWERS   Relationship to Patient?  Self

## 2022-08-08 DIAGNOSIS — J43.8 OTHER EMPHYSEMA (HCC): ICD-10-CM

## 2022-08-08 RX ORDER — FLUTICASONE PROPIONATE AND SALMETEROL XINAFOATE 115; 21 UG/1; UG/1
2 AEROSOL, METERED RESPIRATORY (INHALATION) 2 TIMES DAILY
Qty: 1 EACH | Refills: 3 | Status: SHIPPED | OUTPATIENT
Start: 2022-08-08 | End: 2022-08-10 | Stop reason: SDUPTHER

## 2022-08-10 DIAGNOSIS — J43.8 OTHER EMPHYSEMA (HCC): ICD-10-CM

## 2022-08-10 RX ORDER — FLUTICASONE PROPIONATE AND SALMETEROL XINAFOATE 115; 21 UG/1; UG/1
2 AEROSOL, METERED RESPIRATORY (INHALATION) 2 TIMES DAILY
Qty: 1 EACH | Refills: 3 | Status: SHIPPED
Start: 2022-08-10 | End: 2022-09-26 | Stop reason: CLARIF

## 2022-09-26 ENCOUNTER — TELEPHONE (OUTPATIENT)
Dept: FAMILY MEDICINE CLINIC | Age: 67
End: 2022-09-26

## 2022-09-26 NOTE — TELEPHONE ENCOUNTER
----- Message from Marquise Anthony sent at 9/22/2022  9:49 AM EDT -----  Subject: Message to Provider    QUESTIONS  Information for Provider? Patient states he was in the ER about 10-12 days   ago and was prescribed a new med (he did not have the name) and it is   helping but he will be out before his appt with Lemond Session on 9/28,   Can you call to see if he could get a refill or move up the appt. States   he only has a couple days left.  ---------------------------------------------------------------------------  --------------  Jocy Love INFO  [phone]; OK to leave message on voicemail  ---------------------------------------------------------------------------  --------------  SCRIPT ANSWERS  Relationship to Patient?  Self

## 2022-09-26 NOTE — TELEPHONE ENCOUNTER
Returned call  States he is out and does  not know the name of the medicatoins, he will call back with the names.

## 2022-09-28 ENCOUNTER — OFFICE VISIT (OUTPATIENT)
Dept: FAMILY MEDICINE CLINIC | Age: 67
End: 2022-09-28
Payer: MEDICARE

## 2022-09-28 VITALS
WEIGHT: 287.6 LBS | HEART RATE: 71 BPM | DIASTOLIC BLOOD PRESSURE: 88 MMHG | HEIGHT: 70 IN | OXYGEN SATURATION: 96 % | SYSTOLIC BLOOD PRESSURE: 126 MMHG | TEMPERATURE: 98.5 F | BODY MASS INDEX: 41.17 KG/M2 | RESPIRATION RATE: 22 BRPM

## 2022-09-28 DIAGNOSIS — C61 PROSTATE CANCER (HCC): ICD-10-CM

## 2022-09-28 DIAGNOSIS — J43.8 OTHER EMPHYSEMA (HCC): ICD-10-CM

## 2022-09-28 DIAGNOSIS — M1A.9XX0 CHRONIC GOUT INVOLVING TOE OF RIGHT FOOT WITHOUT TOPHUS, UNSPECIFIED CAUSE: ICD-10-CM

## 2022-09-28 DIAGNOSIS — J44.9 CHRONIC OBSTRUCTIVE PULMONARY DISEASE, UNSPECIFIED COPD TYPE (HCC): ICD-10-CM

## 2022-09-28 DIAGNOSIS — I10 ESSENTIAL HYPERTENSION: ICD-10-CM

## 2022-09-28 DIAGNOSIS — E11.65 TYPE 2 DIABETES MELLITUS WITH HYPERGLYCEMIA, WITH LONG-TERM CURRENT USE OF INSULIN (HCC): Primary | ICD-10-CM

## 2022-09-28 DIAGNOSIS — R06.02 SOB (SHORTNESS OF BREATH): ICD-10-CM

## 2022-09-28 DIAGNOSIS — Z79.4 TYPE 2 DIABETES MELLITUS WITH HYPERGLYCEMIA, WITH LONG-TERM CURRENT USE OF INSULIN (HCC): Primary | ICD-10-CM

## 2022-09-28 DIAGNOSIS — I50.22 CHRONIC SYSTOLIC CHF (CONGESTIVE HEART FAILURE), NYHA CLASS 2 (HCC): ICD-10-CM

## 2022-09-28 DIAGNOSIS — E78.2 MIXED HYPERLIPIDEMIA: ICD-10-CM

## 2022-09-28 PROCEDURE — 1101F PT FALLS ASSESS-DOCD LE1/YR: CPT | Performed by: NURSE PRACTITIONER

## 2022-09-28 PROCEDURE — G8432 DEP SCR NOT DOC, RNG: HCPCS | Performed by: NURSE PRACTITIONER

## 2022-09-28 PROCEDURE — G8754 DIAS BP LESS 90: HCPCS | Performed by: NURSE PRACTITIONER

## 2022-09-28 PROCEDURE — G8427 DOCREV CUR MEDS BY ELIG CLIN: HCPCS | Performed by: NURSE PRACTITIONER

## 2022-09-28 PROCEDURE — 2022F DILAT RTA XM EVC RTNOPTHY: CPT | Performed by: NURSE PRACTITIONER

## 2022-09-28 PROCEDURE — 3017F COLORECTAL CA SCREEN DOC REV: CPT | Performed by: NURSE PRACTITIONER

## 2022-09-28 PROCEDURE — 3052F HG A1C>EQUAL 8.0%<EQUAL 9.0%: CPT | Performed by: NURSE PRACTITIONER

## 2022-09-28 PROCEDURE — 1123F ACP DISCUSS/DSCN MKR DOCD: CPT | Performed by: NURSE PRACTITIONER

## 2022-09-28 PROCEDURE — 99215 OFFICE O/P EST HI 40 MIN: CPT | Performed by: NURSE PRACTITIONER

## 2022-09-28 PROCEDURE — G8536 NO DOC ELDER MAL SCRN: HCPCS | Performed by: NURSE PRACTITIONER

## 2022-09-28 PROCEDURE — 94640 AIRWAY INHALATION TREATMENT: CPT | Performed by: NURSE PRACTITIONER

## 2022-09-28 PROCEDURE — G8752 SYS BP LESS 140: HCPCS | Performed by: NURSE PRACTITIONER

## 2022-09-28 PROCEDURE — G8417 CALC BMI ABV UP PARAM F/U: HCPCS | Performed by: NURSE PRACTITIONER

## 2022-09-28 RX ORDER — FLUTICASONE FUROATE AND VILANTEROL 100; 25 UG/1; UG/1
1 POWDER RESPIRATORY (INHALATION) DAILY
Qty: 60 EACH | Refills: 5 | Status: SHIPPED | OUTPATIENT
Start: 2022-09-28

## 2022-09-28 RX ORDER — TAMSULOSIN HYDROCHLORIDE 0.4 MG/1
0.4 CAPSULE ORAL DAILY
Qty: 90 CAPSULE | Refills: 3 | Status: SHIPPED | OUTPATIENT
Start: 2022-09-28

## 2022-09-28 RX ORDER — SIMVASTATIN 40 MG/1
40 TABLET, FILM COATED ORAL
Qty: 90 TABLET | Refills: 3 | Status: SHIPPED | OUTPATIENT
Start: 2022-09-28

## 2022-09-28 RX ORDER — SACUBITRIL AND VALSARTAN 24; 26 MG/1; MG/1
TABLET, FILM COATED ORAL
COMMUNITY
Start: 2022-08-15 | End: 2023-08-15

## 2022-09-28 RX ORDER — ALLOPURINOL 100 MG/1
100 TABLET ORAL DAILY
Qty: 90 TABLET | Refills: 0 | Status: SHIPPED | OUTPATIENT
Start: 2022-09-28

## 2022-09-28 RX ORDER — METOPROLOL SUCCINATE 100 MG/1
100 TABLET, EXTENDED RELEASE ORAL DAILY
Qty: 90 TABLET | Refills: 0 | Status: SHIPPED | OUTPATIENT
Start: 2022-09-28

## 2022-09-28 RX ORDER — ALBUTEROL SULFATE 0.83 MG/ML
2.5 SOLUTION RESPIRATORY (INHALATION) ONCE
Qty: 1 EACH | Refills: 0
Start: 2022-09-28 | End: 2022-09-28

## 2022-09-28 RX ORDER — BUMETANIDE 1 MG/1
1 TABLET ORAL 2 TIMES DAILY
Qty: 60 TABLET | Refills: 2 | Status: SHIPPED | OUTPATIENT
Start: 2022-09-28

## 2022-09-28 NOTE — PROGRESS NOTES
Room 7     When asked if patient has any concerns he would like to address with MILADY Pedro patient states yes, I have been taking my wifes Vitamin ansd I would like to have my on Vitamin B Complex /Vitamin C Cap/ Tab and Cholecalcif 10 mg tabs. Patient states I need insulin needles 32gauge X 5/ 32. Did patient bring someone? No    Did the patient have DME equipment? No     Did you take your medication today? Yes       1. \"Have you been to the ER, urgent care clinic since your last visit? Hospitalized since your last visit? \" No    2. \"Have you seen or consulted any other health care providers outside of the [address] [place] [address] since your last visit? \" No     3. For patients aged 39-70: Has the patient had a colonoscopy / FIT/ Cologuard? Yes - no Care Gap present      If the patient is female:    4. For patients aged 41-77: Has the patient had a mammogram within the past 2 years? NA - based on age or sex      11. For patients aged 21-65: Has the patient had a pap smear?  NA - based on age or sex        1 most recent PHQ Screens 9/28/2022   Little interest or pleasure in doing things Not at all   Feeling down, depressed, irritable, or hopeless Not at all   Total Score PHQ 2 0         Health Maintenance Due   Topic Date Due    Shingrix Vaccine Age 49> (1 of 2) Never done    AAA Screening 73-69 YO Male Smoking Patients  Never done    Foot Exam Q1  10/11/2021    COVID-19 Vaccine (4 - Booster for Pfizer series) 02/23/2022    Flu Vaccine (1) 08/01/2022       Learning Assessment 1/30/2020   PRIMARY LEARNER Patient   HIGHEST LEVEL OF EDUCATION - PRIMARY LEARNER  GRADUATED HIGH SCHOOL OR GED   BARRIERS PRIMARY LEARNER NONE   CO-LEARNER CAREGIVER No   PRIMARY LANGUAGE ENGLISH   LEARNER PREFERENCE PRIMARY LISTENING   ANSWERED BY Miguel David   RELATIONSHIP SELF

## 2022-09-28 NOTE — PATIENT INSTRUCTIONS
It is very important you take all your medications everyday.      Follow up with Zamzam Love MD in 6 month(s) around 3/2023  Specialty: Vascular Surgery   Phone: [phone]   Where: [facility]

## 2022-09-28 NOTE — PROGRESS NOTES
[address] [place] [address]               [phone]      Darius Morales is a 69 y.o. male and presents with Follow Up Chronic Condition, Cholesterol Problem, Hypertension, and Diabetes       Assessment/Plan:    Diagnoses and all orders for this visit:    1. Type 2 diabetes mellitus with hyperglycemia, with long-term current use of insulin ([facility])  -     HEMOGLOBIN A1C WITH EAG; Future  -     MICROALBUMIN, UR, RAND W/ MICROALB/CREAT RATIO; Future  Not compliant with medications, he did not take ANY of his medications today because they make him urinate too much, I suspect he misses other days also  His diabetes is overall managed by endocrinology  Last A1C I have is 8.8%  Follow up labs prior to next visit  Impressed upon him the importance of taking all his medications daily  2. Essential hypertension  -     metoprolol succinate (TOPROL-XL) 100 mg tablet; Take 1 Tablet by mouth daily.  -     METABOLIC PANEL, COMPREHENSIVE; Future  Endorses medication complaince most of the time, blood pressure stable continue entresto, metoprolol  Refill provided, labs prior to next visit  3. Mixed hyperlipidemia  -     simvastatin (ZOCOR) 40 mg tablet; Take 1 Tablet by mouth nightly. -     LIPID PANEL; Future  Endorses medication compliance most of the time, refill provided, follow up labs prior to next visit  4. Prostate cancer ([facility]  -     tamsulosin (FLOMAX) 0.4 mg capsule; Take 1 Capsule by mouth daily. Refill provided  5. Chronic systolic CHF (congestive heart failure), NYHA class 2 ([facility])  -     bumetanide (BUMEX) 1 mg tablet; Take 1 Tablet by mouth two (2) times a day. Refill provided  6. Chronic gout involving toe of right foot without tophus, unspecified cause  -     allopurinoL (ZYLOPRIM) 100 mg tablet; Take 1 Tablet by mouth daily. Refill provided  7. Chronic obstructive pulmonary disease, unspecified COPD type ([facility]  -     tiotropium (SPIRIVA) 18 mcg inhalation capsule;  Take 1 Capsule by inhalation daily.  -     REFERRAL TO PULMONARY DISEASE  -     fluticasone furoate-vilanteroL (Breo Ellipta) 100-25 mcg/dose inhaler; Take 1 Puff by inhalation daily. Currently not seeing pulmonary, referral placed  He appeared sob at todays visit, lung sounds diminished  Adminstered one HHN with decrease in SOB and better air flow in lungs  8. Other emphysema ([address]  -     tiotropium (SPIRIVA) 18 mcg inhalation capsule; Take 1 Capsule by inhalation daily.  -     REFERRAL TO PULMONARY DISEASE    9. SOB (shortness of breath)  -     albuterol (PROVENTIL VENTOLIN) 2.5 mg /3 mL (0.083 %) nebu; 3 mL by Nebulization route once for 1 dose. -     ALBUTEROL, INHAL. SOL., FDA-APPROVED FINAL, NON-COMPOUND UNIT DOSE, 1 MG      Follow-up and Dispositions    Return in about 3 months (around 12/28/2022) for DM, DM foot, HLD, HTN, 30 min, office only, w/labs prior. Health Maintenance:   Health Maintenance   Topic Date Due    Shingrix Vaccine Age 49> (1 of 2) Never done    AAA Screening 73-69 YO Male Smoking Patients  Never done    Foot Exam Q1  10/11/2021    COVID-19 Vaccine (4 - Booster for Pfizer series) 02/23/2022    Flu Vaccine (1) 08/01/2022    Eye Exam Retinal or Dilated  02/02/2023    A1C test (Diabetic or Prediabetic)  05/18/2023    MICROALBUMIN Q1  05/18/2023    Lipid Screen  05/18/2023    Medicare Yearly Exam  05/24/2023    Depression Screen  09/28/2023    DTaP/Tdap/Td series (2 - Td or Tdap) 03/31/2030    Colorectal Cancer Screening Combo  12/17/2030    Hepatitis C Screening  Completed    Pneumococcal 65+ years  Completed        Subjective:    Labs obtained prior to visit? NO  Reviewed with patient?  Not applicable    Pulmonary emphysema  Has been out of his medications since about Monday  Refills were called in last Thursday  Sob in the chair  Sats 96%      DMII- managed by Endocrinology   Patient reports medication compliance  does not take daily, did not take any medications today because it makes him urinat too much  Diabetic diet compliance occasionally  Patient monitors blood sugars regularly  CGM   Reports am fasting sugars range  100's, during todays visit it is 205   Denies hypoglycemic episodes yes  Denies polyuria, polydipsia, paraesthesia, vision changes? no, polyuria  Engaging in daily exercise? No     Diabetic Foot and Eye Exam HM Status   Topic Date Due    Diabetic Foot Care  10/11/2021    Eye Exam  02/02/2023     Hemoglobin A1c   Date Value Ref Range Status   05/18/2022 8.8 (H) 4.2 - 5.6 % Final     Comment:     (NOTE)  HbA1C Interpretive Ranges  <5.7              Normal  5.7 - 6.4         Consider Prediabetes  >6.5              Consider Diabetes       Hemoglobin A1c, External   Date Value Ref Range Status   01/29/2021 7.4 % Final   ]  Creatinine, urine   Date Value Ref Range Status   05/18/2022 89.00 30 - 125 mg/dL Final     Microalbumin/Creat ratio (mg/g creat)   Date Value Ref Range Status   05/18/2022 849 (H) 0 - 30 mg/g Final   09/19/2019 16 0 - 30 mg/g Final     Key Antihyperglycemic Medications               empagliflozin (Jardiance) 25 mg tablet (Taking) Take 1 Tablet by mouth daily. metFORMIN (GLUCOPHAGE) 850 mg tablet (Taking) 850 mg two (2) times daily (with meals). Bydureon BCise 2 mg/0.85 mL atIn (Taking) by SubCUTAneous route every seven (7) days. LANTUS SOLOSTAR U-100 INSULIN 100 unit/mL (3 mL) inpn (Taking) 62 Units by SubCUTAneous route Before breakfast and dinner. Indications: type 2 diabetes mellitus    APIDRA SOLOSTAR 100 unit/mL pen (Taking) by SubCUTAneous route Before breakfast, lunch, and dinner. Indications: sliding scale        Hypertension:  Visit Vitals  /88   Pulse 71   Temp 98.5 °F (36.9 °C) (Temporal)   Resp 22   Ht 5' 10\" (1.778 m)   Wt 287 lb 9.6 oz (130.5 kg)   SpO2 96%   BMI 41.27 kg/m²      Patient reports taking medications as instructed.  no, states he normally takes them but did not take today because it makes him urinate too much   Medication side effects noted. no  Headache upon wakening. no   Home BP monitoring: Does not check  Do you experience chest pain/pressure or SOB with exertion? no  Maintain a low Sodium diet? yes  Key CAD CHF Meds               sacubitriL-valsartan (Entresto) 24-26 mg tablet (Taking) Take  by mouth. bumetanide (BUMEX) 1 mg tablet (Taking) Take 1 Tablet by mouth two (2) times a day. metoprolol succinate (TOPROL-XL) 100 mg tablet (Taking) Take 1 Tablet by mouth daily. simvastatin (ZOCOR) 40 mg tablet (Taking) Take 1 Tablet by mouth nightly. Xarelto 20 mg tab tablet (Taking) Take 1 Tablet by mouth daily (with breakfast). aspirin 81 mg chewable tablet (Taking) Take 1 Tablet by mouth daily. spironolactone (ALDACTONE) 25 mg tablet (Taking) Take 1 Tablet by mouth two (2) times a day. BUN   Date Value Ref Range Status   05/18/2022 19 (H) 7.0 - 18 MG/DL Final     Creatinine   Date Value Ref Range Status   05/18/2022 1.20 0.6 - 1.3 MG/DL Final     GFR est AA   Date Value Ref Range Status   05/18/2022 >60 >60 ml/min/1.73m2 Final     Potassium   Date Value Ref Range Status   05/18/2022 3.9 3.5 - 5.5 mmol/L Final         HLD:  Has been compliant with meds  Yes  Compliant with low-fat diet. most of the time    Denies myalgias or other side effects. yes  The 10-year ASCVD risk score (Folsom DK, et al., 2019) is: 27%    Cholesterol, total   Date Value Ref Range Status   05/18/2022 147 <200 MG/DL Final     Triglyceride   Date Value Ref Range Status   05/18/2022 267 (H) <150 MG/DL Final     Comment:     The drugs N-acetylcysteine (NAC) and  Metamiszole have been found to cause falsely  low results in this chemical assay. Please  be sure to submit blood samples obtained  BEFORE administration of either of these  drugs to assure correct results.        HDL Cholesterol   Date Value Ref Range Status   05/18/2022 66 (H) 40 - 60 MG/DL Final     LDL, calculated   Date Value Ref Range Status   05/18/2022 27.6 0 - 100 MG/DL Final   ]  Key Antihyperlipidemia Meds               simvastatin (ZOCOR) 40 mg tablet (Taking) Take 1 Tablet by mouth nightly. ROS:     ROS  As stated in HPI, otherwise all others negative. The problem list was updated as a part of today's visit. Patient Active Problem List   Diagnosis Code    Essential hypertension I10    Hx-TIA (transient ischemic attack) Z86.73    Mixed hyperlipidemia E78.2    Gout M10.9    Erectile disorder due to medical condition in male N52.1    Type 2 diabetes mellitus with hyperglycemia, with long-term current use of insulin ([facility]) E11.65, Z79.4    Prostate cancer ([facility] C61    Pacemaker Z95.0    BMI 40.0-44.9, adult ([facility]) Z68.41    Congestive heart failure (HCC) I50.9    Pulmonary emphysema ([facility]) J43.9    Cardiomyopathy ([facility]) I42.9    Chronic systolic CHF (congestive heart failure), NYHA class 2 ([facility]) I50.22    Noncompliance with treatment plan Z91.11    Paroxysmal atrial fibrillation ([facility]) I48.0    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 SH:  Social History     Tobacco Use    Smoking status: Former     Packs/day: 0.50     Years: 20.00     Pack years: 10.00     Types: Cigarettes     Quit date:      Years since quittin.7    Smokeless tobacco: Never   Vaping Use    Vaping Use: Never used   Substance Use Topics    Alcohol use: Yes     Alcohol/week: 1.0 standard drink     Types: 1 Shots of liquor per week     Comment: occasionally    Drug use: No       Medications/Allergies:  Current Outpatient Medications on File Prior to Visit   Medication Sig Dispense Refill    sacubitriL-valsartan (Entresto) 24-26 mg tablet Take  by mouth. Xarelto 20 mg tab tablet Take 1 Tablet by mouth daily (with breakfast). 90 Tablet 3    aspirin 81 mg chewable tablet Take 1 Tablet by mouth daily. 90 Tablet 3    empagliflozin (Jardiance) 25 mg tablet Take 1 Tablet by mouth daily. 90 Tablet 3    spironolactone (ALDACTONE) 25 mg tablet Take 1 Tablet by mouth two (2) times a day. 180 Tablet 3    metFORMIN (GLUCOPHAGE) 850 mg tablet 850 mg two (2) times daily (with meals). Calcium-Cholecalciferol, D3, 500 mg(1,250mg) -400 unit tab Take 1 Tab by mouth daily. 90 Tab 1    OTHER Vitamin d3 400 iu cap  daily      Bydureon BCise 2 mg/0.85 mL atIn by SubCUTAneous route every seven (7) days. FreeStyle Pasquale 14 Day Sensor kit apply 1 SENSOR TO THE BACK OF UPPER ARM REMOVE AND REPLACE every . ..  (REFER TO PRESCRIPTION NOTES). LANTUS SOLOSTAR U-100 INSULIN 100 unit/mL (3 mL) inpn 62 Units by SubCUTAneous route Before breakfast and dinner. Indications: type 2 diabetes mellitus (Patient taking differently: 60 Units by SubCUTAneous route Before breakfast and dinner. Indications: type 2 diabetes mellitus) 15 Adjustable Dose Pre-filled Pen Syringe 2    APIDRA SOLOSTAR 100 unit/mL pen by SubCUTAneous route Before breakfast, lunch, and dinner. Indications: sliding scale      letrozole (FEMARA) 2.5 mg tablet Take 2.5 mg by mouth. No current facility-administered medications on file prior to visit. No Known Allergies    Objective:  Visit Vitals  /88   Pulse 71   Temp 98.5 °F (36.9 °C) (Temporal)   Resp 22   Ht 5' 10\" (1.778 m)   Wt 287 lb 9.6 oz (130.5 kg)   SpO2 96%   BMI 41.27 kg/m²    Body mass index is 41.27 kg/m². Physical assessment  Physical Exam  Vitals and nursing note reviewed. Eyes:      Conjunctiva/sclera: Conjunctivae normal.      Pupils: Pupils are equal, round, and reactive to light. Cardiovascular:      Rate and Rhythm: Normal rate and regular rhythm. Heart sounds: Normal heart sounds. No murmur heard. No friction rub. No gallop. Pulmonary:      Effort: Pulmonary effort is normal.      Breath sounds: Normal breath sounds.       Comments: Speaking in full short sentences, lung sounds diminished  HHN administered in office, after one treatment, less sob and increased air movement to all lobes, crackles to the bases  Suspect combination of emphysema and CHF    Musculoskeletal:         General: Normal range of motion. Cervical back: Normal range of motion. Skin:     General: Skin is warm and dry. Neurological:      Mental Status: He is alert. Labwork and Ancillary Studies:    CBC w/Diff  Lab Results   Component Value Date/Time    WBC 5.8 11/03/2020 10:44 AM    HGB 14.9 11/03/2020 10:44 AM    PLATELET 502 97/70/2139 10:44 AM         Basic Metabolic Profile  Lab Results   Component Value Date/Time    Sodium 145 05/18/2022 01:53 PM    Potassium 3.9 05/18/2022 01:53 PM    Chloride 107 05/18/2022 01:53 PM    CO2 32 05/18/2022 01:53 PM    Anion gap 6 05/18/2022 01:53 PM    Glucose 263 (H) 05/18/2022 01:53 PM    BUN 19 (H) 05/18/2022 01:53 PM    Creatinine 1.20 05/18/2022 01:53 PM    BUN/Creatinine ratio 16 05/18/2022 01:53 PM    GFR est AA >60 05/18/2022 01:53 PM    GFR est non-AA >60 05/18/2022 01:53 PM    Calcium 9.1 05/18/2022 01:53 PM        Cholesterol  Lab Results   Component Value Date/Time    Cholesterol, total 147 05/18/2022 01:53 PM    HDL Cholesterol 66 (H) 05/18/2022 01:53 PM    LDL, calculated 27.6 05/18/2022 01:53 PM    Triglyceride 267 (H) 05/18/2022 01:53 PM    CHOL/HDL Ratio 2.2 05/18/2022 01:53 PM           I have discussed the diagnosis with the patient and the intended plan as seen in the above orders. The patient has received an After-Visit Summary and questions were answered concerning future plans. An After Visit Summary was printed and given to the patient. All diagnosis have been discussed with the patient and all of the patient's questions have been answered. Follow-up and Dispositions    Return in about 3 months (around 12/28/2022) for DM, DM foot, HLD, HTN, 30 min, office only, w/labs prior.            Filomena Foy AGNP-BC  [address] [facility]   [address] [facility] [address]  [address]

## 2022-11-07 DIAGNOSIS — Z79.4 TYPE 2 DIABETES MELLITUS WITH HYPERGLYCEMIA, WITH LONG-TERM CURRENT USE OF INSULIN (HCC): ICD-10-CM

## 2022-11-07 DIAGNOSIS — E11.65 TYPE 2 DIABETES MELLITUS WITH HYPERGLYCEMIA, WITH LONG-TERM CURRENT USE OF INSULIN (HCC): ICD-10-CM

## 2023-01-04 ENCOUNTER — OFFICE VISIT (OUTPATIENT)
Dept: FAMILY MEDICINE CLINIC | Age: 68
End: 2023-01-04
Payer: MEDICARE

## 2023-01-04 VITALS
SYSTOLIC BLOOD PRESSURE: 146 MMHG | OXYGEN SATURATION: 96 % | TEMPERATURE: 98.4 F | RESPIRATION RATE: 18 BRPM | HEIGHT: 70 IN | HEART RATE: 73 BPM | WEIGHT: 289 LBS | BODY MASS INDEX: 41.37 KG/M2 | DIASTOLIC BLOOD PRESSURE: 92 MMHG

## 2023-01-04 DIAGNOSIS — M1A.9XX0 CHRONIC GOUT INVOLVING TOE OF RIGHT FOOT WITHOUT TOPHUS, UNSPECIFIED CAUSE: ICD-10-CM

## 2023-01-04 DIAGNOSIS — I10 ESSENTIAL HYPERTENSION: ICD-10-CM

## 2023-01-04 DIAGNOSIS — I48.0 PAROXYSMAL ATRIAL FIBRILLATION (HCC): ICD-10-CM

## 2023-01-04 DIAGNOSIS — C61 PROSTATE CANCER (HCC): ICD-10-CM

## 2023-01-04 DIAGNOSIS — E78.2 MIXED HYPERLIPIDEMIA: ICD-10-CM

## 2023-01-04 DIAGNOSIS — Z79.4 TYPE 2 DIABETES MELLITUS WITH HYPERGLYCEMIA, WITH LONG-TERM CURRENT USE OF INSULIN (HCC): Primary | ICD-10-CM

## 2023-01-04 DIAGNOSIS — I50.22 CHRONIC SYSTOLIC CHF (CONGESTIVE HEART FAILURE), NYHA CLASS 2 (HCC): ICD-10-CM

## 2023-01-04 DIAGNOSIS — J43.8 OTHER EMPHYSEMA (HCC): ICD-10-CM

## 2023-01-04 DIAGNOSIS — E11.65 TYPE 2 DIABETES MELLITUS WITH HYPERGLYCEMIA, WITH LONG-TERM CURRENT USE OF INSULIN (HCC): Primary | ICD-10-CM

## 2023-01-04 PROBLEM — U07.1 COVID-19: Status: RESOLVED | Noted: 2021-09-09 | Resolved: 2023-01-04

## 2023-01-04 PROCEDURE — G8536 NO DOC ELDER MAL SCRN: HCPCS | Performed by: NURSE PRACTITIONER

## 2023-01-04 PROCEDURE — G8432 DEP SCR NOT DOC, RNG: HCPCS | Performed by: NURSE PRACTITIONER

## 2023-01-04 PROCEDURE — 1101F PT FALLS ASSESS-DOCD LE1/YR: CPT | Performed by: NURSE PRACTITIONER

## 2023-01-04 PROCEDURE — 1123F ACP DISCUSS/DSCN MKR DOCD: CPT | Performed by: NURSE PRACTITIONER

## 2023-01-04 PROCEDURE — 3046F HEMOGLOBIN A1C LEVEL >9.0%: CPT | Performed by: NURSE PRACTITIONER

## 2023-01-04 PROCEDURE — G8417 CALC BMI ABV UP PARAM F/U: HCPCS | Performed by: NURSE PRACTITIONER

## 2023-01-04 PROCEDURE — 99214 OFFICE O/P EST MOD 30 MIN: CPT | Performed by: NURSE PRACTITIONER

## 2023-01-04 PROCEDURE — G8427 DOCREV CUR MEDS BY ELIG CLIN: HCPCS | Performed by: NURSE PRACTITIONER

## 2023-01-04 PROCEDURE — 3078F DIAST BP <80 MM HG: CPT | Performed by: NURSE PRACTITIONER

## 2023-01-04 PROCEDURE — 3017F COLORECTAL CA SCREEN DOC REV: CPT | Performed by: NURSE PRACTITIONER

## 2023-01-04 PROCEDURE — 3074F SYST BP LT 130 MM HG: CPT | Performed by: NURSE PRACTITIONER

## 2023-01-04 PROCEDURE — 2022F DILAT RTA XM EVC RTNOPTHY: CPT | Performed by: NURSE PRACTITIONER

## 2023-01-04 RX ORDER — ALLOPURINOL 100 MG/1
100 TABLET ORAL DAILY
Qty: 90 TABLET | Refills: 0 | Status: SHIPPED | OUTPATIENT
Start: 2023-01-04

## 2023-01-04 RX ORDER — METOPROLOL SUCCINATE 100 MG/1
100 TABLET, EXTENDED RELEASE ORAL DAILY
Qty: 90 TABLET | Refills: 3 | Status: SHIPPED | OUTPATIENT
Start: 2023-01-04

## 2023-01-04 RX ORDER — TAMSULOSIN HYDROCHLORIDE 0.4 MG/1
0.4 CAPSULE ORAL
Qty: 90 CAPSULE | Refills: 3 | Status: SHIPPED | OUTPATIENT
Start: 2023-01-04

## 2023-01-04 RX ORDER — FLUTICASONE FUROATE AND VILANTEROL 100; 25 UG/1; UG/1
1 POWDER RESPIRATORY (INHALATION) DAILY
Qty: 60 EACH | Refills: 5 | Status: SHIPPED | OUTPATIENT
Start: 2023-01-04

## 2023-01-04 NOTE — PROGRESS NOTES
[address] [place] [address]               [phone]      David Christine is a 69 y.o. male and presents with Follow Up Chronic Condition, Cholesterol Problem, Hypertension, and Diabetes       Assessment/Plan:    Diagnoses and all orders for this visit:    1. Type 2 diabetes mellitus with hyperglycemia, with long-term current use of insulin (HCC)  -     HEMOGLOBIN A1C WITH EAG; Future  -     MICROALBUMIN, UR, RAND W/ MICROALB/CREAT RATIO; Future  Endorses medication compliance, Follow up labs prior to next visit, Denies signs and symptoms of hyper or hypoglycemia, and managed by endocrinology    2. Essential hypertension  -     metoprolol succinate (TOPROL-XL) 100 mg tablet; Take 1 Tablet by mouth daily.  -     METABOLIC PANEL, COMPREHENSIVE; Future  Not compliant with medications has ran out about a week ago, Refill provided, Follow up labs prior to next visit, and unable to accurately evaluate blood pressure as he did not take his medication today    3. Mixed hyperlipidemia  -     LIPID PANEL; Future  Endorses medication compliance, Follow up labs prior to next visit, and Denies abdominal pain or symptoms of myalgia   4. Chronic gout involving toe of right foot without tophus, unspecified cause  -     allopurinoL (ZYLOPRIM) 100 mg tablet; Take 1 Tablet by mouth daily. 5. Other emphysema ([facility]  Assessment & Plan:   monitored by specialist. No acute findings meriting change in the plan    Orders:  -     fluticasone furoate-vilanteroL (Breo Ellipta) 100-25 mcg/dose inhaler; Take 1 Puff by inhalation daily. 6. Chronic systolic CHF (congestive heart failure), NYHA class 2 ([address]  Assessment & Plan:   monitored by specialist. No acute findings meriting change in the plan      7. Paroxysmal atrial fibrillation (HCC)  Assessment & Plan:   monitored by specialist. No acute findings meriting change in the plan      8.  Prostate cancer [facility])  Assessment & Plan:   monitored by specialist. No acute findings meriting change in the plan    Orders:  -     tamsulosin (FLOMAX) 0.4 mg capsule; Take 1 Capsule by mouth daily (after dinner). Follow-up and Dispositions    Return in about 3 months (around 4/4/2023) for DM, DM foot, HTN, HLD, 30 min, office only, w/labs prior. Health Maintenance:   Health Maintenance   Topic Date Due    Shingles Vaccine (1 of 2) Never done    AAA Screening 73-67 YO Male Smoking Patients  Never done    Foot Exam Q1  10/11/2021    Flu Vaccine (1) 08/01/2022    Eye Exam Retinal or Dilated  02/02/2023    COVID-19 Vaccine (5 - Booster for Pfizer series) 02/16/2023    Diabetic Alb to Cr ratio (uACR) test  05/18/2023    GFR test (Diabetes, CKD 3-4, OR last GFR 15-59)  05/18/2023    A1C test (Diabetic or Prediabetic)  05/18/2023    Lipid Screen  05/18/2023    Medicare Yearly Exam  05/24/2023    Depression Screen  01/04/2024    DTaP/Tdap/Td series (2 - Td or Tdap) 03/31/2030    Colorectal Cancer Screening Combo  12/17/2030    Hepatitis C Screening  Completed    Pneumococcal 65+ years  Completed        Subjective:    Labs obtained prior to visit? NO ( did have labs done for other md on 12/13/22  Reviewed with patient? Not applicable    DMII-   Patient reports medication compliance Yes  Diabetic diet compliance most of the time  Patient monitors blood sugars regularly TID   Home glucose readings:  am: 100 afternoon: 150-160   PM: 180-190   Denies hypoglycemic episodes yes  Denies polyuria, polydipsia, paraesthesia, vision changes? yes  Engaging in daily exercise?  No     Diabetic Foot and Eye Exam HM Status   Topic Date Due    Diabetic Foot Care  10/11/2021    Eye Exam  02/02/2023     Hemoglobin A1c   Date Value Ref Range Status   05/18/2022 8.8 (H) 4.2 - 5.6 % Final     Comment:     (NOTE)  HbA1C Interpretive Ranges  <5.7              Normal  5.7 - 6.4         Consider Prediabetes  >6.5              Consider Diabetes       Hemoglobin A1c, External   Date Value Ref Range Status   01/29/2021 7.4 % Final   ]  Creatinine, urine random   Date Value Ref Range Status   05/18/2022 89.00 30 - 125 mg/dL Final     Microalbumin/Creat ratio (mg/g creat)   Date Value Ref Range Status   05/18/2022 849 (H) 0 - 30 mg/g Final   09/19/2019 16 0 - 30 mg/g Final     Key Antihyperglycemic Medications               empagliflozin (Jardiance) 25 mg tablet (Taking) Take 1 Tablet by mouth daily. metFORMIN (GLUCOPHAGE) 850 mg tablet (Taking) 850 mg two (2) times daily (with meals). Bydureon BCise 2 mg/0.85 mL atIn (Taking) by SubCUTAneous route every seven (7) days. LANTUS SOLOSTAR U-100 INSULIN 100 unit/mL (3 mL) inpn (Taking) 62 Units by SubCUTAneous route Before breakfast and dinner. Indications: type 2 diabetes mellitus    APIDRA SOLOSTAR 100 unit/mL pen (Taking) by SubCUTAneous route Before breakfast, lunch, and dinner. Indications: sliding scale        Hypertension:  Visit Vitals  BP (!) 146/92   Pulse 73   Temp 98.4 °F (36.9 °C) (Temporal)   Resp 18   Ht 5' 10\" (1.778 m)   Wt 289 lb (131.1 kg)   SpO2 96%   BMI 41.47 kg/m²      Patient reports taking medications as instructed. no, did not take today, states he is out of them   Medication side effects noted. no  Headache upon wakening. no   Home BP monitoring: Does not check  Do you experience chest pain/pressure or SOB with exertion? no  Maintain a low Sodium diet? no  Key CAD CHF Meds               metoprolol succinate (TOPROL-XL) 100 mg tablet (Taking) Take 1 Tablet by mouth daily. sacubitriL-valsartan (Entresto) 24-26 mg tablet (Taking) Take  by mouth. bumetanide (BUMEX) 1 mg tablet (Taking) Take 1 Tablet by mouth two (2) times a day. simvastatin (ZOCOR) 40 mg tablet (Taking) Take 1 Tablet by mouth nightly. Xarelto 20 mg tab tablet (Taking) Take 1 Tablet by mouth daily (with breakfast). aspirin 81 mg chewable tablet (Taking) Take 1 Tablet by mouth daily.     spironolactone (ALDACTONE) 25 mg tablet (Taking/Discontinued) Take 1 Tablet by mouth two (2) times a day. BUN   Date Value Ref Range Status   05/18/2022 19 (H) 7.0 - 18 MG/DL Final     Creatinine   Date Value Ref Range Status   05/18/2022 1.20 0.6 - 1.3 MG/DL Final     GFR est AA   Date Value Ref Range Status   05/18/2022 >60 >60 ml/min/1.73m2 Final     Potassium   Date Value Ref Range Status   05/18/2022 3.9 3.5 - 5.5 mmol/L Final       HLD:  Has been compliant with meds  Yes  Compliant with low-fat diet. most of the time    Denies myalgias or other side effects. yes  The 10-year ASCVD risk score (Kayy BAUGH, et al., 2019) is: 34.1%    Cholesterol, total   Date Value Ref Range Status   05/18/2022 147 <200 MG/DL Final     Triglyceride   Date Value Ref Range Status   05/18/2022 267 (H) <150 MG/DL Final     Comment:     The drugs N-acetylcysteine (NAC) and  Metamiszole have been found to cause falsely  low results in this chemical assay. Please  be sure to submit blood samples obtained  BEFORE administration of either of these  drugs to assure correct results. HDL Cholesterol   Date Value Ref Range Status   05/18/2022 66 (H) 40 - 60 MG/DL Final     LDL, calculated   Date Value Ref Range Status   05/18/2022 27.6 0 - 100 MG/DL Final   ]  Key Antihyperlipidemia Meds               simvastatin (ZOCOR) 40 mg tablet (Taking) Take 1 Tablet by mouth nightly. ROS:     ROS  As stated in HPI, otherwise all others negative. The problem list was updated as a part of today's visit.   Patient Active Problem List   Diagnosis Code    Essential hypertension I10    Hx-TIA (transient ischemic attack) Z86.73    Mixed hyperlipidemia E78.2    Gout M10.9    Erectile disorder due to medical condition in male N52.1    Type 2 diabetes mellitus with hyperglycemia, with long-term current use of insulin (HCC) E11.65, Z79.4    Prostate cancer ([facility] C61    Pacemaker Z95.0    BMI 40.0-44.9, adult (HCC) Z68.41    Congestive heart failure (HCC) I50.9    Pulmonary emphysema ([facility] J43.9 Cardiomyopathy ([facility] I42.9    Chronic systolic CHF (congestive heart failure), NYHA class 2 (HCC) I50.22    Noncompliance with treatment plan Z91.199    Paroxysmal atrial fibrillation (HCC) I48.0    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 SH:  Social History     Tobacco Use    Smoking status: Former     Packs/day: 0.50     Years: 20.00     Pack years: 10.00     Types: Cigarettes     Quit date:      Years since quittin.0    Smokeless tobacco: Never   Vaping Use    Vaping Use: Never used   Substance Use Topics    Alcohol use: Yes     Alcohol/week: 1.0 standard drink     Types: 1 Shots of liquor per week     Comment: occasionally    Drug use: No       Medications/Allergies:  Current Outpatient Medications on File Prior to Visit   Medication Sig Dispense Refill    empagliflozin (Jardiance) 25 mg tablet Take 1 Tablet by mouth daily. 90 Tablet 3    sacubitriL-valsartan (Entresto) 24-26 mg tablet Take  by mouth. bumetanide (BUMEX) 1 mg tablet Take 1 Tablet by mouth two (2) times a day. 60 Tablet 2    tiotropium (SPIRIVA) 18 mcg inhalation capsule Take 1 Capsule by inhalation daily. 90 Capsule 2    simvastatin (ZOCOR) 40 mg tablet Take 1 Tablet by mouth nightly. 90 Tablet 3    Xarelto 20 mg tab tablet Take 1 Tablet by mouth daily (with breakfast). 90 Tablet 3    aspirin 81 mg chewable tablet Take 1 Tablet by mouth daily. 90 Tablet 3    [DISCONTINUED] spironolactone (ALDACTONE) 25 mg tablet Take 1 Tablet by mouth two (2) times a day. 180 Tablet 3    metFORMIN (GLUCOPHAGE) 850 mg tablet 850 mg two (2) times daily (with meals). Calcium-Cholecalciferol, D3, 500 mg(1,250mg) -400 unit tab Take 1 Tab by mouth daily.  90 Tab 1    OTHER Vitamin d3 400 iu cap  daily      Bydureon BCise 2 mg/0.85 mL atIn by SubCUTAneous route every seven (7) days. FreeStyle Pasquale 14 Day Sensor kit apply 1 SENSOR TO THE BACK OF UPPER ARM REMOVE AND REPLACE every . ..  (REFER TO PRESCRIPTION NOTES). LANTUS SOLOSTAR U-100 INSULIN 100 unit/mL (3 mL) inpn 62 Units by SubCUTAneous route Before breakfast and dinner. Indications: type 2 diabetes mellitus (Patient taking differently: 60 Units by SubCUTAneous route Before breakfast and dinner. Indications: type 2 diabetes mellitus) 15 Adjustable Dose Pre-filled Pen Syringe 2    APIDRA SOLOSTAR 100 unit/mL pen by SubCUTAneous route Before breakfast, lunch, and dinner. Indications: sliding scale       No current facility-administered medications on file prior to visit. No Known Allergies    Objective:  Visit Vitals  BP (!) 146/92   Pulse 73   Temp 98.4 °F (36.9 °C) (Temporal)   Resp 18   Ht 5' 10\" (1.778 m)   Wt 289 lb (131.1 kg)   SpO2 96%   BMI 41.47 kg/m²    Body mass index is 41.47 kg/m². Physical assessment  Physical Exam  Vitals and nursing note reviewed. Eyes:      Conjunctiva/sclera: Conjunctivae normal.      Pupils: Pupils are equal, round, and reactive to light. Cardiovascular:      Rate and Rhythm: Normal rate and regular rhythm. Heart sounds: Normal heart sounds. No murmur heard. No friction rub. No gallop. Pulmonary:      Effort: Pulmonary effort is normal.      Breath sounds: Normal breath sounds. Musculoskeletal:         General: Normal range of motion. Cervical back: Normal range of motion. Skin:     General: Skin is warm and dry. Neurological:      Mental Status: He is alert.          Labwork and Ancillary Studies:    CBC w/Diff  Lab Results   Component Value Date/Time    WBC 5.8 11/03/2020 10:44 AM    HGB 14.9 11/03/2020 10:44 AM    PLATELET 660 55/87/0758 10:44 AM         Basic Metabolic Profile  Lab Results   Component Value Date/Time    Sodium 145 05/18/2022 01:53 PM    Potassium 3.9 05/18/2022 01:53 PM    Chloride 107 05/18/2022 01:53 PM    CO2 32 05/18/2022 01:53 PM    Anion gap 6 05/18/2022 01:53 PM    Glucose 263 (H) 05/18/2022 01:53 PM    BUN 19 (H) 05/18/2022 01:53 PM    Creatinine 1.20 05/18/2022 01:53 PM    BUN/Creatinine ratio 16 05/18/2022 01:53 PM    GFR est AA >60 05/18/2022 01:53 PM    GFR est non-AA >60 05/18/2022 01:53 PM    Calcium 9.1 05/18/2022 01:53 PM        Cholesterol  Lab Results   Component Value Date/Time    Cholesterol, total 147 05/18/2022 01:53 PM    HDL Cholesterol 66 (H) 05/18/2022 01:53 PM    LDL, calculated 27.6 05/18/2022 01:53 PM    Triglyceride 267 (H) 05/18/2022 01:53 PM    CHOL/HDL Ratio 2.2 05/18/2022 01:53 PM           I have discussed the diagnosis with the patient and the intended plan as seen in the above orders. The patient has received an After-Visit Summary and questions were answered concerning future plans. An After Visit Summary was printed and given to the patient. All diagnosis have been discussed with the patient and all of the patient's questions have been answered. Follow-up and Dispositions    Return in about 3 months (around 4/4/2023) for DM, DM foot, HTN, HLD, 30 min, office only, w/labs prior. Garret Zuñiga, AGNP-BC  [address]  [address]

## 2023-01-04 NOTE — PROGRESS NOTES
Room 7     When asked if patient has seen the Pulmonary patient states yes . When asked if patient has any concerns he would like to address with MILADY Pedro patient states no . Did patient bring someone? No    Did the patient have DME equipment? No     Did you take your medication today? Patient states no I am out of some medication      1. \"Have you been to the ER, urgent care clinic since your last visit? Hospitalized since your last visit? \" No    2. \"Have you seen or consulted any other health care providers outside of the [address] [place] [address] since your last visit? \" No     3. For patients aged 39-70: Has the patient had a colonoscopy / FIT/ Cologuard? Yes - no Care Gap present      If the patient is female:    4. For patients aged 41-77: Has the patient had a mammogram within the past 2 years? NA - based on age or sex      11. For patients aged 21-65: Has the patient had a pap smear?  NA - based on age or sex        1 most recent PHQ Screens 1/4/2023   Little interest or pleasure in doing things Not at all   Feeling down, depressed, irritable, or hopeless Not at all   Total Score PHQ 2 0         Health Maintenance Due   Topic Date Due    Shingles Vaccine (1 of 2) Never done    AAA Screening 73-67 YO Male Smoking Patients  Never done    Foot Exam Q1  10/11/2021    Flu Vaccine (1) 08/01/2022       Learning Assessment 1/30/2020   PRIMARY LEARNER Patient   HIGHEST LEVEL OF EDUCATION - PRIMARY LEARNER  GRADUATED HIGH SCHOOL OR GED   BARRIERS PRIMARY LEARNER NONE   CO-LEARNER CAREGIVER No   PRIMARY LANGUAGE ENGLISH   LEARNER PREFERENCE PRIMARY LISTENING   ANSWERED BY Sophie Cadena   RELATIONSHIP SELF

## 2023-01-05 DIAGNOSIS — I10 ESSENTIAL HYPERTENSION: ICD-10-CM

## 2023-01-05 RX ORDER — SPIRONOLACTONE 25 MG/1
25 TABLET ORAL 2 TIMES DAILY
Qty: 180 TABLET | Refills: 3 | Status: SHIPPED | OUTPATIENT
Start: 2023-01-05

## 2023-01-06 DIAGNOSIS — I50.22 CHRONIC SYSTOLIC CHF (CONGESTIVE HEART FAILURE), NYHA CLASS 2 (HCC): ICD-10-CM

## 2023-01-06 RX ORDER — BUMETANIDE 1 MG/1
1 TABLET ORAL 2 TIMES DAILY
Qty: 60 TABLET | Refills: 2 | Status: SHIPPED | OUTPATIENT
Start: 2023-01-06

## 2023-03-10 ENCOUNTER — TELEPHONE (OUTPATIENT)
Facility: CLINIC | Age: 68
End: 2023-03-10

## 2023-03-10 NOTE — TELEPHONE ENCOUNTER
----- Message from [facility] sent at 3/9/2023 12:05 PM EST -----  Subject: Message to Provider    QUESTIONS  Information for Provider? Pt would like an appt for lab appt 2 days before   appt on 04/14/2023.   ---------------------------------------------------------------------------  --------------  Guillaume Cotneh INFO  [phone]; OK to leave message on voicemail  ---------------------------------------------------------------------------  --------------  SCRIPT ANSWERS  Relationship to Patient?  Self

## 2023-04-13 ENCOUNTER — HOSPITAL ENCOUNTER (OUTPATIENT)
Facility: HOSPITAL | Age: 68
Setting detail: SPECIMEN
Discharge: HOME | End: 2023-04-16
Payer: MEDICARE

## 2023-04-13 LAB
ALBUMIN SERPL-MCNC: 3.7 G/DL (ref 3.4–5)
ALBUMIN/GLOB SERPL: 1.1 (ref 0.8–1.7)
ALP SERPL-CCNC: 77 U/L (ref 45–117)
ALT SERPL-CCNC: 27 U/L (ref 16–61)
ANION GAP SERPL CALC-SCNC: 4 MMOL/L (ref 3–18)
AST SERPL-CCNC: 14 U/L (ref 10–38)
BILIRUB SERPL-MCNC: 0.5 MG/DL (ref 0.2–1)
BUN SERPL-MCNC: 19 MG/DL (ref 7–18)
BUN/CREAT SERPL: 15 (ref 12–20)
CALCIUM SERPL-MCNC: 8.9 MG/DL (ref 8.5–10.1)
CHLORIDE SERPL-SCNC: 104 MMOL/L (ref 100–111)
CHOLEST SERPL-MCNC: 127 MG/DL
CO2 SERPL-SCNC: 32 MMOL/L (ref 21–32)
CREAT SERPL-MCNC: 1.25 MG/DL (ref 0.6–1.3)
CREAT UR-MCNC: 122 MG/DL (ref 30–125)
EST. AVERAGE GLUCOSE BLD GHB EST-MCNC: 192 MG/DL
GLOBULIN SER CALC-MCNC: 3.5 G/DL (ref 2–4)
GLUCOSE SERPL-MCNC: 201 MG/DL (ref 74–99)
HBA1C MFR BLD: 8.3 % (ref 4.2–5.6)
HDLC SERPL-MCNC: 69 MG/DL (ref 40–60)
HDLC SERPL: 1.8 (ref 0–5)
LDLC SERPL CALC-MCNC: 21.2 MG/DL (ref 0–100)
LIPID PANEL: ABNORMAL
MICROALBUMIN UR-MCNC: 16.6 MG/DL (ref 0–3)
MICROALBUMIN/CREAT UR-RTO: 136 MG/G (ref 0–30)
POTASSIUM SERPL-SCNC: 3.8 MMOL/L (ref 3.5–5.5)
PROT SERPL-MCNC: 7.2 G/DL (ref 6.4–8.2)
SODIUM SERPL-SCNC: 140 MMOL/L (ref 136–145)
TRIGL SERPL-MCNC: 184 MG/DL
VLDLC SERPL CALC-MCNC: 36.8 MG/DL

## 2023-04-13 PROCEDURE — 80053 COMPREHEN METABOLIC PANEL: CPT

## 2023-04-13 PROCEDURE — 82043 UR ALBUMIN QUANTITATIVE: CPT

## 2023-04-13 PROCEDURE — 83036 HEMOGLOBIN GLYCOSYLATED A1C: CPT

## 2023-04-13 PROCEDURE — 80061 LIPID PANEL: CPT

## 2023-04-13 PROCEDURE — 36415 COLL VENOUS BLD VENIPUNCTURE: CPT

## 2023-04-14 ENCOUNTER — OFFICE VISIT (OUTPATIENT)
Facility: CLINIC | Age: 68
End: 2023-04-14
Payer: MEDICARE

## 2023-04-14 VITALS
HEIGHT: 70 IN | HEART RATE: 75 BPM | SYSTOLIC BLOOD PRESSURE: 129 MMHG | BODY MASS INDEX: 39.8 KG/M2 | OXYGEN SATURATION: 94 % | DIASTOLIC BLOOD PRESSURE: 73 MMHG | WEIGHT: 278 LBS | TEMPERATURE: 97.3 F | RESPIRATION RATE: 16 BRPM

## 2023-04-14 DIAGNOSIS — J30.2 SEASONAL ALLERGIES: ICD-10-CM

## 2023-04-14 DIAGNOSIS — I42.9 CARDIOMYOPATHY, UNSPECIFIED TYPE (HCC): ICD-10-CM

## 2023-04-14 DIAGNOSIS — E11.65 TYPE 2 DIABETES MELLITUS WITH HYPERGLYCEMIA, WITH LONG-TERM CURRENT USE OF INSULIN (HCC): Primary | ICD-10-CM

## 2023-04-14 DIAGNOSIS — I10 ESSENTIAL HYPERTENSION: ICD-10-CM

## 2023-04-14 DIAGNOSIS — R09.82 POST-NASAL DRIP: ICD-10-CM

## 2023-04-14 DIAGNOSIS — I71.40 ABDOMINAL AORTIC ANEURYSM (AAA) WITHOUT RUPTURE, UNSPECIFIED PART (HCC): ICD-10-CM

## 2023-04-14 DIAGNOSIS — R05.3 CHRONIC COUGH: ICD-10-CM

## 2023-04-14 DIAGNOSIS — Z79.4 TYPE 2 DIABETES MELLITUS WITH HYPERGLYCEMIA, WITH LONG-TERM CURRENT USE OF INSULIN (HCC): Primary | ICD-10-CM

## 2023-04-14 DIAGNOSIS — E78.2 MIXED HYPERLIPIDEMIA: ICD-10-CM

## 2023-04-14 PROBLEM — J43.9 PULMONARY EMPHYSEMA (HCC): Status: ACTIVE | Noted: 2019-02-09

## 2023-04-14 PROBLEM — C61 PROSTATE CANCER (HCC): Status: ACTIVE | Noted: 2018-02-08

## 2023-04-14 PROBLEM — I50.9 CONGESTIVE HEART FAILURE (HCC): Status: RESOLVED | Noted: 2019-03-12 | Resolved: 2023-04-14

## 2023-04-14 PROBLEM — I50.9 CONGESTIVE HEART FAILURE (HCC): Status: ACTIVE | Noted: 2019-03-12

## 2023-04-14 PROBLEM — Z95.810 ICD (IMPLANTABLE CARDIOVERTER-DEFIBRILLATOR), BIVENTRICULAR, IN SITU: Status: ACTIVE | Noted: 2020-01-15

## 2023-04-14 PROCEDURE — 3074F SYST BP LT 130 MM HG: CPT | Performed by: NURSE PRACTITIONER

## 2023-04-14 PROCEDURE — 3017F COLORECTAL CA SCREEN DOC REV: CPT | Performed by: NURSE PRACTITIONER

## 2023-04-14 PROCEDURE — 99214 OFFICE O/P EST MOD 30 MIN: CPT | Performed by: NURSE PRACTITIONER

## 2023-04-14 PROCEDURE — G8427 DOCREV CUR MEDS BY ELIG CLIN: HCPCS | Performed by: NURSE PRACTITIONER

## 2023-04-14 PROCEDURE — 1123F ACP DISCUSS/DSCN MKR DOCD: CPT | Performed by: NURSE PRACTITIONER

## 2023-04-14 PROCEDURE — 3078F DIAST BP <80 MM HG: CPT | Performed by: NURSE PRACTITIONER

## 2023-04-14 PROCEDURE — G8417 CALC BMI ABV UP PARAM F/U: HCPCS | Performed by: NURSE PRACTITIONER

## 2023-04-14 PROCEDURE — 2022F DILAT RTA XM EVC RTNOPTHY: CPT | Performed by: NURSE PRACTITIONER

## 2023-04-14 PROCEDURE — 3052F HG A1C>EQUAL 8.0%<EQUAL 9.0%: CPT | Performed by: NURSE PRACTITIONER

## 2023-04-14 PROCEDURE — 1036F TOBACCO NON-USER: CPT | Performed by: NURSE PRACTITIONER

## 2023-04-14 RX ORDER — FLUTICASONE PROPIONATE 50 MCG
2 SPRAY, SUSPENSION (ML) NASAL DAILY
Qty: 48 G | Refills: 1 | Status: SHIPPED | OUTPATIENT
Start: 2023-04-14

## 2023-04-14 RX ORDER — CETIRIZINE HYDROCHLORIDE 10 MG/1
10 TABLET ORAL DAILY
Qty: 90 TABLET | Refills: 1 | Status: SHIPPED | OUTPATIENT
Start: 2023-04-14

## 2023-04-14 SDOH — ECONOMIC STABILITY: INCOME INSECURITY: HOW HARD IS IT FOR YOU TO PAY FOR THE VERY BASICS LIKE FOOD, HOUSING, MEDICAL CARE, AND HEATING?: NOT HARD AT ALL

## 2023-04-14 SDOH — ECONOMIC STABILITY: HOUSING INSECURITY
IN THE LAST 12 MONTHS, WAS THERE A TIME WHEN YOU DID NOT HAVE A STEADY PLACE TO SLEEP OR SLEPT IN A SHELTER (INCLUDING NOW)?: NO

## 2023-04-14 SDOH — ECONOMIC STABILITY: FOOD INSECURITY: WITHIN THE PAST 12 MONTHS, YOU WORRIED THAT YOUR FOOD WOULD RUN OUT BEFORE YOU GOT MONEY TO BUY MORE.: NEVER TRUE

## 2023-04-14 SDOH — ECONOMIC STABILITY: FOOD INSECURITY: WITHIN THE PAST 12 MONTHS, THE FOOD YOU BOUGHT JUST DIDN'T LAST AND YOU DIDN'T HAVE MONEY TO GET MORE.: NEVER TRUE

## 2023-04-14 ASSESSMENT — PATIENT HEALTH QUESTIONNAIRE - PHQ9
SUM OF ALL RESPONSES TO PHQ QUESTIONS 1-9: 0
SUM OF ALL RESPONSES TO PHQ9 QUESTIONS 1 & 2: 0
2. FEELING DOWN, DEPRESSED OR HOPELESS: 0
SUM OF ALL RESPONSES TO PHQ QUESTIONS 1-9: 0
1. LITTLE INTEREST OR PLEASURE IN DOING THINGS: 0
SUM OF ALL RESPONSES TO PHQ QUESTIONS 1-9: 0
SUM OF ALL RESPONSES TO PHQ QUESTIONS 1-9: 0

## 2023-04-14 ASSESSMENT — ANXIETY QUESTIONNAIRES
7. FEELING AFRAID AS IF SOMETHING AWFUL MIGHT HAPPEN: 0
1. FEELING NERVOUS, ANXIOUS, OR ON EDGE: 0
5. BEING SO RESTLESS THAT IT IS HARD TO SIT STILL: 0
6. BECOMING EASILY ANNOYED OR IRRITABLE: 0
2. NOT BEING ABLE TO STOP OR CONTROL WORRYING: 0
3. WORRYING TOO MUCH ABOUT DIFFERENT THINGS: 0
GAD7 TOTAL SCORE: 0
4. TROUBLE RELAXING: 0

## 2023-04-20 ENCOUNTER — TELEPHONE (OUTPATIENT)
Facility: CLINIC | Age: 68
End: 2023-04-20

## 2023-04-20 DIAGNOSIS — R29.898 RIGHT ARM WEAKNESS: ICD-10-CM

## 2023-04-20 DIAGNOSIS — I42.9 CARDIOMYOPATHY, UNSPECIFIED TYPE (HCC): Primary | ICD-10-CM

## 2023-04-20 DIAGNOSIS — I50.22 CHRONIC SYSTOLIC CHF (CONGESTIVE HEART FAILURE), NYHA CLASS 2 (HCC): ICD-10-CM

## 2023-05-24 RX ORDER — ALLOPURINOL 100 MG/1
100 TABLET ORAL DAILY
Qty: 90 TABLET | Refills: 1 | Status: SHIPPED | OUTPATIENT
Start: 2023-05-24

## 2023-05-30 ENCOUNTER — OFFICE VISIT (OUTPATIENT)
Facility: CLINIC | Age: 68
End: 2023-05-30
Payer: MEDICARE

## 2023-05-30 VITALS
DIASTOLIC BLOOD PRESSURE: 98 MMHG | HEART RATE: 73 BPM | HEIGHT: 70 IN | OXYGEN SATURATION: 93 % | TEMPERATURE: 98.4 F | SYSTOLIC BLOOD PRESSURE: 150 MMHG | BODY MASS INDEX: 39.89 KG/M2 | RESPIRATION RATE: 18 BRPM

## 2023-05-30 DIAGNOSIS — Z00.00 MEDICARE ANNUAL WELLNESS VISIT, SUBSEQUENT: Primary | ICD-10-CM

## 2023-05-30 DIAGNOSIS — E11.65 TYPE 2 DIABETES MELLITUS WITH HYPERGLYCEMIA, WITH LONG-TERM CURRENT USE OF INSULIN (HCC): ICD-10-CM

## 2023-05-30 DIAGNOSIS — J30.2 SEASONAL ALLERGIES: ICD-10-CM

## 2023-05-30 DIAGNOSIS — G89.29 CHRONIC MIDLINE LOW BACK PAIN WITHOUT SCIATICA: ICD-10-CM

## 2023-05-30 DIAGNOSIS — I50.22 CHRONIC SYSTOLIC CHF (CONGESTIVE HEART FAILURE), NYHA CLASS 2 (HCC): ICD-10-CM

## 2023-05-30 DIAGNOSIS — R41.3 MEMORY PROBLEM: ICD-10-CM

## 2023-05-30 DIAGNOSIS — Z71.89 ADVANCED CARE PLANNING/COUNSELING DISCUSSION: ICD-10-CM

## 2023-05-30 DIAGNOSIS — M54.50 CHRONIC MIDLINE LOW BACK PAIN WITHOUT SCIATICA: ICD-10-CM

## 2023-05-30 DIAGNOSIS — Z79.4 TYPE 2 DIABETES MELLITUS WITH HYPERGLYCEMIA, WITH LONG-TERM CURRENT USE OF INSULIN (HCC): ICD-10-CM

## 2023-05-30 PROCEDURE — G8417 CALC BMI ABV UP PARAM F/U: HCPCS | Performed by: NURSE PRACTITIONER

## 2023-05-30 PROCEDURE — 2022F DILAT RTA XM EVC RTNOPTHY: CPT | Performed by: NURSE PRACTITIONER

## 2023-05-30 PROCEDURE — 3052F HG A1C>EQUAL 8.0%<EQUAL 9.0%: CPT | Performed by: NURSE PRACTITIONER

## 2023-05-30 PROCEDURE — G8427 DOCREV CUR MEDS BY ELIG CLIN: HCPCS | Performed by: NURSE PRACTITIONER

## 2023-05-30 PROCEDURE — G0439 PPPS, SUBSEQ VISIT: HCPCS | Performed by: NURSE PRACTITIONER

## 2023-05-30 PROCEDURE — 3080F DIAST BP >= 90 MM HG: CPT | Performed by: NURSE PRACTITIONER

## 2023-05-30 PROCEDURE — 1123F ACP DISCUSS/DSCN MKR DOCD: CPT | Performed by: NURSE PRACTITIONER

## 2023-05-30 PROCEDURE — 3017F COLORECTAL CA SCREEN DOC REV: CPT | Performed by: NURSE PRACTITIONER

## 2023-05-30 PROCEDURE — 99213 OFFICE O/P EST LOW 20 MIN: CPT | Performed by: NURSE PRACTITIONER

## 2023-05-30 PROCEDURE — 1036F TOBACCO NON-USER: CPT | Performed by: NURSE PRACTITIONER

## 2023-05-30 PROCEDURE — 3077F SYST BP >= 140 MM HG: CPT | Performed by: NURSE PRACTITIONER

## 2023-05-30 RX ORDER — LETROZOLE 2.5 MG/1
2.5 TABLET, FILM COATED ORAL DAILY
COMMUNITY

## 2023-05-30 RX ORDER — CETIRIZINE HYDROCHLORIDE 10 MG/1
10 TABLET ORAL DAILY
Qty: 90 TABLET | Refills: 1 | Status: SHIPPED | OUTPATIENT
Start: 2023-05-30

## 2023-05-30 RX ORDER — OXYCODONE HYDROCHLORIDE AND ACETAMINOPHEN 5; 325 MG/1; MG/1
TABLET ORAL
COMMUNITY
Start: 2023-04-05 | End: 2023-05-30

## 2023-05-30 RX ORDER — BUMETANIDE 1 MG/1
1 TABLET ORAL 2 TIMES DAILY
Qty: 180 TABLET | Refills: 1 | Status: SHIPPED | OUTPATIENT
Start: 2023-05-30

## 2023-05-30 RX ORDER — CEPHALEXIN 500 MG/1
CAPSULE ORAL
COMMUNITY
Start: 2023-04-05 | End: 2023-05-30

## 2023-05-30 RX ORDER — NAPROXEN 500 MG/1
500 TABLET ORAL 2 TIMES DAILY WITH MEALS
Qty: 180 TABLET | Refills: 3 | Status: SHIPPED | OUTPATIENT
Start: 2023-05-30

## 2023-05-30 ASSESSMENT — PATIENT HEALTH QUESTIONNAIRE - PHQ9
2. FEELING DOWN, DEPRESSED OR HOPELESS: 1
SUM OF ALL RESPONSES TO PHQ QUESTIONS 1-9: 1
SUM OF ALL RESPONSES TO PHQ9 QUESTIONS 1 & 2: 1
SUM OF ALL RESPONSES TO PHQ QUESTIONS 1-9: 1
1. LITTLE INTEREST OR PLEASURE IN DOING THINGS: 0
SUM OF ALL RESPONSES TO PHQ QUESTIONS 1-9: 1
SUM OF ALL RESPONSES TO PHQ QUESTIONS 1-9: 1

## 2023-05-30 ASSESSMENT — LIFESTYLE VARIABLES
HOW MANY STANDARD DRINKS CONTAINING ALCOHOL DO YOU HAVE ON A TYPICAL DAY: PATIENT DOES NOT DRINK
HOW OFTEN DO YOU HAVE A DRINK CONTAINING ALCOHOL: NEVER

## 2023-05-30 NOTE — ACP (ADVANCE CARE PLANNING)
Advance Care Planning     General Advance Care Planning (ACP) Conversation    Date of Conversation: 5/30/2023  Conducted with: Patient with Decision Making Capacity    Healthcare Decision Maker:    Primary Decision Maker: Matilda Adkins - Spouse - [phone]  Click here to complete [address] including selection of the Healthcare Decision Maker Relationship (ie \"Primary\"). Today we documented Decision Maker(s) consistent with Legal Next of Kin hierarchy.     Content/Action Overview:  Has NO ACP documents/care preferences - information provided, considering goals and options  Reviewed DNR/DNI and patient elects Full Code (Attempt Resuscitation)  resuscitation preferences      Length of Voluntary ACP Conversation in minutes:  <16 minutes (Non-Billable)    Annia Marin, RUBY - CNP

## 2023-05-30 NOTE — PROGRESS NOTES
Room 8     When asked if patient has any concerns he would like to address with SAMI Giraldo patient states yes back pain about 7-8 month ago . Patient wife states my  fell about about 2 weeks ago . Did patient bring someone? Yes wife     Did the patient have DME equipment? No     Did you take your medication today? Yes       1. \"Have you been to the ER, urgent care clinic since your last visit? Hospitalized since your last visit? \" No     2. \"Have you seen or consulted any other health care providers outside of the [address] [place] [address] since your last visit? \" No     3. For patients aged 39-70: Has the patient had a colonoscopy / FIT/ Cologuard? Yes      If the patient is female:    4. For patients aged 41-77: Has the patient had a mammogram within the past 2 years? N/A      5. For patients aged 21-65: Has the patient had a pap smear? {Cancer Care Gap present?  N/A      PHQ-9  5/30/2023   Little interest or pleasure in doing things 0   Little interest or pleasure in doing things -   Feeling down, depressed, or hopeless 1   PHQ-2 Score 1   Total Score PHQ 2 -   PHQ-9 Total Score 1          Health Maintenance Due   Topic Date Due    Shingles vaccine (1 of 2) Never done    Diabetic foot exam  10/11/2021    Diabetic retinal exam  02/02/2022    COVID-19 Vaccine (5 - Booster for Keep Me Certified series) 02/16/2023    Annual Wellness Visit (AWV)  05/24/2023

## 2023-05-30 NOTE — PROGRESS NOTES
Medicare Annual Wellness Visit    Una Dixon is here for Medicare AWV and Back Pain    Assessment & Plan   Medicare annual wellness visit, subsequent  Completed today to include ETOH and depression screening  Advanced care planning/counseling discussion  Healthcare decision maker verified and ACP Discussion performed and documented in note  Chronic midline low back pain without sciatica  -     [facility])  -     naproxen (NAPROSYN) 500 MG tablet; Take 1 tablet by mouth 2 times daily (with meals), Disp-180 tablet, R-3Normal  Visit today included evaluation and management of worsening back pain  This is a long standing problem, feels he is unable to perform PT due to the pain  Refer to spine specialist  Rx for NSAID provided, cautioned to monitor for signs and symptoms of bleeding as he is also on [facility]  Patient verbalized understanding and is in agreement with this plan of care  Memory problem  -     Danial Huber MD, Neurology, [facility] concerns with his memory, states cannot remember anything more than 6 months old  Refer to neuro for further evaluation  Seasonal allergies  -     cetirizine (ZYRTEC) 10 MG tablet; Take 1 tablet by mouth daily, Disp-90 tablet, R-1Normal    Type 2 diabetes mellitus with hyperglycemia, with long-term current use of insulin ([facility])  -     Insulin Pen Needle 32G X 4 MM MISC; DAILY Starting Tue 5/30/2023, Disp-100 each, R-3, Normal    Chronic systolic CHF (congestive heart failure), NYHA class 2 (HCC)  -     bumetanide (BUMEX) 1 MG tablet; Take 1 tablet by mouth 2 times daily, Disp-180 tablet, R-1Normal    Recommendations for Preventive Services Due: see orders and patient instructions/AVS.  Recommended screening schedule for the next 5-10 years is provided to the patient in written form: see Patient Instructions/AVS.     Return for keep previously scheduled follow up.      Subjective   The following

## 2023-06-06 ENCOUNTER — TELEPHONE (OUTPATIENT)
Facility: CLINIC | Age: 68
End: 2023-06-06

## 2023-06-06 NOTE — TELEPHONE ENCOUNTER
Yessy from [facility] stated she will be faxing over a form for a Pre- Op Clearance from Patients last visit here. Fax has arrived.

## 2023-06-19 ENCOUNTER — TELEPHONE (OUTPATIENT)
Facility: CLINIC | Age: 68
End: 2023-06-19

## 2023-06-20 DIAGNOSIS — I48.0 PAROXYSMAL ATRIAL FIBRILLATION (HCC): Primary | ICD-10-CM

## 2023-06-22 ENCOUNTER — OFFICE VISIT (OUTPATIENT)
Age: 68
End: 2023-06-22

## 2023-06-22 VITALS
WEIGHT: 283 LBS | HEART RATE: 72 BPM | TEMPERATURE: 97 F | OXYGEN SATURATION: 95 % | HEIGHT: 70 IN | BODY MASS INDEX: 40.52 KG/M2

## 2023-06-22 DIAGNOSIS — M51.36 LUMBAR DEGENERATIVE DISC DISEASE: ICD-10-CM

## 2023-06-22 DIAGNOSIS — C61 PROSTATE CANCER (HCC): ICD-10-CM

## 2023-06-22 DIAGNOSIS — M54.50 CHRONIC BILATERAL LOW BACK PAIN WITHOUT SCIATICA: Primary | ICD-10-CM

## 2023-06-22 DIAGNOSIS — E66.01 OBESITY, CLASS III, BMI 40-49.9 (MORBID OBESITY) (HCC): ICD-10-CM

## 2023-06-22 DIAGNOSIS — G89.29 CHRONIC BILATERAL LOW BACK PAIN WITHOUT SCIATICA: ICD-10-CM

## 2023-06-22 DIAGNOSIS — M47.816 LUMBAR SPONDYLOSIS: ICD-10-CM

## 2023-06-22 DIAGNOSIS — M54.50 CHRONIC BILATERAL LOW BACK PAIN WITHOUT SCIATICA: ICD-10-CM

## 2023-06-22 DIAGNOSIS — M43.16 SPONDYLOLISTHESIS OF LUMBAR REGION: ICD-10-CM

## 2023-06-22 DIAGNOSIS — G89.29 CHRONIC BILATERAL LOW BACK PAIN WITHOUT SCIATICA: Primary | ICD-10-CM

## 2023-06-22 RX ORDER — METHYLPREDNISOLONE 4 MG/1
TABLET ORAL
Qty: 1 KIT | Refills: 0 | Status: SHIPPED | OUTPATIENT
Start: 2023-06-22 | End: 2023-06-28

## 2023-06-22 NOTE — PROGRESS NOTES
Ifeoma Candelaria presents today for   Chief Complaint   Patient presents with    Back Pain     Lower         Is someone accompanying this pt? no    Is the patient using any DME equipment during OV? no    Depression Screening:  PHQ-9 Questionaire 5/30/2023 4/14/2023 1/4/2023 9/28/2022 9/1/2021   Little interest or pleasure in doing things 0 0 0 0 0   Feeling down, depressed, or hopeless 1 0 0 0 0   PHQ-9 Total Score 1 0 0 0 0     PHQ Scores 5/30/2023 4/14/2023 1/4/2023 9/28/2022 5/23/2022 9/1/2021 8/10/2021   PHQ2 Score 1 0 0 0 - 0 -   PHQ2 Score - - - - 0 0 0   PHQ9 Score 1 0 0 0 - 0 -         Abuse Screening: AMB Abuse Screening 4/14/2023   Do you ever feel afraid of your partner? N   Are you in a relationship with someone who physically or mentally threatens you? N   Is it safe for you to go home? Y       Coordination of Care:  1. Have you been to the ER, urgent care clinic since your last visit? no  Hospitalized since your last visit? No, pt had a scheduled out pt battery replacement in his defibrillator/pacemaker    2. Have you seen or consulted any other health care providers outside of the [address] [place] [address] since your last visit? Yes, cardiology, pcp, weight loss consult  Include any pap smears or colon screening.  no

## 2023-06-22 NOTE — PROGRESS NOTES
Chief complaint   Chief Complaint   Patient presents with    Back Pain     Lower         History of Present Illness:  Jessee Hunt is a  77 y.o.  male comes in today referred by his PCP for back pain. He denies any radicular pain. He states the back pain started a year ago without any injury. He states it is worse with standing and walking. He describes it as a deep aching pain. He has tried physical therapy but that increased his pain and he could not tolerate it. He is also tried Tylenol without relief. He denies fever bowel bladder dysfunction. He is retired. He is a non-smoker. He does have a history of prostate cancer and underwent proton therapy for that. He is diabetic and states his blood sugar runs between 105 and 110 when he checks it. He denies fever bowel bladder dysfunction. Patient reports he is hoping to get gastric bypass in the next month or so. Past Medical History:    Diabetes, seasonal allergies, memory problems, CHF, prostate cancer, coronary artery, A-fib, COPD, hyperlipidemia    Past Surgical History:    Pacemaker, right ankle ORIF      Physical Exam: The patient is a 56-year-old male well-developed well-nourished who is alert and oriented with a normal mood and affect. He has a full weightbearing, bent forward posture and gait. He does not utilize any assistive device. He has 5 out of 5 strength bilateral lower extremities. Negative straight leg raise. He has pain with hyperextension lumbar spine. He is nontender to palpation along the midline spine. Assessment and Plan: This is a patient with low back pain. We did a lumbar AP lateral flexion-extension x-ray. It does show severe spondylosis and a L3-4 spondylolisthesis with severe degenerative disc disease at L5-S1. We will get an MRI of his lumbar spine. He may be a candidate for injections or surgery. The patient does have a history of prostate cancer. He is unable to take NSAIDs due to being on Xarelto.

## 2023-07-10 ENCOUNTER — HOSPITAL ENCOUNTER (OUTPATIENT)
Facility: HOSPITAL | Age: 68
Setting detail: SPECIMEN
Discharge: HOME | End: 2023-07-13
Payer: MEDICARE

## 2023-07-10 LAB
ALBUMIN SERPL-MCNC: 4.2 G/DL (ref 3.4–5)
ALBUMIN/GLOB SERPL: 1.3 (ref 0.8–1.7)
ALP SERPL-CCNC: 68 U/L (ref 45–117)
ALT SERPL-CCNC: 30 U/L (ref 16–61)
ANION GAP SERPL CALC-SCNC: 3 MMOL/L (ref 3–18)
AST SERPL-CCNC: 18 U/L (ref 10–38)
BILIRUB SERPL-MCNC: 0.5 MG/DL (ref 0.2–1)
BUN SERPL-MCNC: 23 MG/DL (ref 7–18)
BUN/CREAT SERPL: 19 (ref 12–20)
CALCIUM SERPL-MCNC: 9.2 MG/DL (ref 8.5–10.1)
CHLORIDE SERPL-SCNC: 105 MMOL/L (ref 100–111)
CHOLEST SERPL-MCNC: 162 MG/DL
CO2 SERPL-SCNC: 35 MMOL/L (ref 21–32)
CREAT SERPL-MCNC: 1.2 MG/DL (ref 0.6–1.3)
EST. AVERAGE GLUCOSE BLD GHB EST-MCNC: 166 MG/DL
GLOBULIN SER CALC-MCNC: 3.2 G/DL (ref 2–4)
GLUCOSE SERPL-MCNC: 155 MG/DL (ref 74–99)
HBA1C MFR BLD: 7.4 % (ref 4.2–5.6)
HDLC SERPL-MCNC: 81 MG/DL (ref 40–60)
HDLC SERPL: 2 (ref 0–5)
LDLC SERPL CALC-MCNC: 61.8 MG/DL (ref 0–100)
LIPID PANEL: ABNORMAL
POTASSIUM SERPL-SCNC: 3.9 MMOL/L (ref 3.5–5.5)
PROT SERPL-MCNC: 7.4 G/DL (ref 6.4–8.2)
SODIUM SERPL-SCNC: 143 MMOL/L (ref 136–145)
TRIGL SERPL-MCNC: 96 MG/DL
VLDLC SERPL CALC-MCNC: 19.2 MG/DL

## 2023-07-10 PROCEDURE — 80053 COMPREHEN METABOLIC PANEL: CPT

## 2023-07-10 PROCEDURE — 83036 HEMOGLOBIN GLYCOSYLATED A1C: CPT

## 2023-07-10 PROCEDURE — 80061 LIPID PANEL: CPT

## 2023-07-10 PROCEDURE — 36415 COLL VENOUS BLD VENIPUNCTURE: CPT

## 2023-09-13 ENCOUNTER — OFFICE VISIT (OUTPATIENT)
Facility: CLINIC | Age: 68
End: 2023-09-13
Payer: MEDICARE

## 2023-09-13 VITALS
SYSTOLIC BLOOD PRESSURE: 195 MMHG | HEIGHT: 70 IN | WEIGHT: 286 LBS | HEART RATE: 60 BPM | BODY MASS INDEX: 40.94 KG/M2 | OXYGEN SATURATION: 93 % | TEMPERATURE: 97.7 F | DIASTOLIC BLOOD PRESSURE: 79 MMHG | RESPIRATION RATE: 22 BRPM

## 2023-09-13 DIAGNOSIS — M54.50 CHRONIC MIDLINE LOW BACK PAIN WITHOUT SCIATICA: ICD-10-CM

## 2023-09-13 DIAGNOSIS — Z09 HOSPITAL DISCHARGE FOLLOW-UP: ICD-10-CM

## 2023-09-13 DIAGNOSIS — G89.29 CHRONIC MIDLINE LOW BACK PAIN WITHOUT SCIATICA: ICD-10-CM
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
## 2023-09-13 PROCEDURE — 3078F DIAST BP <80 MM HG: CPT | Performed by: NURSE PRACTITIONER

## 2023-09-13 PROCEDURE — 99214 OFFICE O/P EST MOD 30 MIN: CPT | Performed by: NURSE PRACTITIONER

## 2023-09-13 PROCEDURE — 1123F ACP DISCUSS/DSCN MKR DOCD: CPT | Performed by: NURSE PRACTITIONER

## 2023-09-13 PROCEDURE — G8417 CALC BMI ABV UP PARAM F/U: HCPCS | Performed by: NURSE PRACTITIONER

## 2023-09-13 PROCEDURE — 3074F SYST BP LT 130 MM HG: CPT | Performed by: NURSE PRACTITIONER

## 2023-09-13 PROCEDURE — 3017F COLORECTAL CA SCREEN DOC REV: CPT | Performed by: NURSE PRACTITIONER

## 2023-09-13 PROCEDURE — G8427 DOCREV CUR MEDS BY ELIG CLIN: HCPCS | Performed by: NURSE PRACTITIONER

## 2023-09-13 PROCEDURE — 1036F TOBACCO NON-USER: CPT | Performed by: NURSE PRACTITIONER

## 2023-09-13 RX ORDER — HYDROCODONE POLISTIREX AND CHLORPHENIRAMINE POLISTIREX 10; 8 MG/5ML; MG/5ML
5 SUSPENSION, EXTENDED RELEASE ORAL EVERY 12 HOURS PRN
COMMUNITY
Start: 2023-09-09 | End: 2023-09-13

## 2023-09-13 RX ORDER — TRAMADOL HYDROCHLORIDE 50 MG/1
50 TABLET ORAL EVERY 4 HOURS PRN
Qty: 42 TABLET | Refills: 0 | Status: SHIPPED | OUTPATIENT
Start: 2023-09-13 | End: 2023-09-20

## 2023-09-13 ASSESSMENT — PATIENT HEALTH QUESTIONNAIRE - PHQ9
SUM OF ALL RESPONSES TO PHQ QUESTIONS 1-9: 0
SUM OF ALL RESPONSES TO PHQ QUESTIONS 1-9: 0
1. LITTLE INTEREST OR PLEASURE IN DOING THINGS: 0
2. FEELING DOWN, DEPRESSED OR HOPELESS: 0
SUM OF ALL RESPONSES TO PHQ9 QUESTIONS 1 & 2: 0
SUM OF ALL RESPONSES TO PHQ QUESTIONS 1-9: 0
SUM OF ALL RESPONSES TO PHQ QUESTIONS 1-9: 0

## 2023-09-13 NOTE — PROGRESS NOTES
9 [facility], [address] [place] [address]               [phone]      Magda Dodge is a 77 y.o. male and presents with Follow-Up from Hospital       Assessment/Plan:    1. Fall, subsequent encounter  April Holden at home, went to ED, work up showed no fractures  Order for cane provided  Advised to follow up with his current orthopedist for his continued back pain  Rx for tramadol provided for pain  Patient verbalized understanding and is in agreement with this plan of care  2. Hospital discharge follow-up    3. Chronic midline low back pain without sciatica  -     DME Order for (Specify) as OP  -     traMADol (ULTRAM) 50 MG tablet; Take 1 tablet by mouth every 4 hours as needed for Pain for up to 7 days. Intended supply: 7 days. Take lowest dose possible to manage pain Max Daily Amount: 300 mg, Disp-42 tablet, R-0Normal         Follow up and disposition:   Return in about 2 months (around 11/13/2023) for DM, HTN, 30min, office, w/labsprior. Subjective:    Labs obtained prior to visit? No  Reviewed with patient?  N/A    ED visit x 2  9/9: went to ED for cough and asthma  Had fallen prior but did not tell them because he forgot    9/11: went for hip pain after his fall on Saturday  Had CT abd and pelvis to evaluate for retroperitoneal bleed as he is on xarelto, on that CT scan he was found to have kidney stone: 3mm non obstructing of the inferior pole of the right kidney  Rx for flomax provided along and a strainer  Wife told him not to take his blood pressure medication while on the flomax because she is worried about his blood pressure going too low      Pain on his right hip from fall  X-ray in ED showed no fracture or subluxation  Right now he is taking nothing for his pain  Since the fall he cannot stand on his own, asking for a cane  States this is the same pain he has been having in his lower back, he is seeing Dr. Zeyad Laird for this  The back pain was worsened by the fall      ROS:

## 2023-09-13 NOTE — PATIENT INSTRUCTIONS
100 [facility] Tio Lutz MD   8080 [facility]., [address] [place] [address] [address]   [phone]  For your memory    Shaji Cano, RAZIA    [address]    [phone] (Work)    For your back pain

## 2023-09-13 NOTE — PROGRESS NOTES
[address] had concerns including Follow-Up from Hospital. for today's visit . When asked if patient has any concerns he would like to address with SAMI Smith . Patient states I went to [facility] and they told me I had Gallstones and I would like to discuss the next steps for having gallstones . SAMI Smith has been notified  of patient concerns . Did patient bring someone? No     Did the patient have DME equipment? Yes bandar Colmenares     Did you take your medication today? Yes       1. \"Have you been to the ER, urgent care clinic since your last visit? Hospitalized since your last visit? \" Patient states I went to [facility] in [place] due to being in pain . 2. \"Have you seen or consulted any other health care providers outside of the [address] [place] [address] Avenue since your last visit? \" No     3. For patients aged 43-73: Has the patient had a colonoscopy / FIT/ Cologuard? Yes      If the patient is female:    4. For patients aged 43-66: Has the patient had a mammogram within the past 2 years? N/A      5. For patients aged 21-65: Has the patient had a pap smear? {Cancer Care Gap present? N/A        9/13/2023     2:15 PM   Amb Fall Risk Assessment and TUG Test   Do you feel unsteady or are you worried about falling?  yes   2 or more falls in past year? yes   Fall with injury in past year?  no              9/13/2023     2:15 PM   PHQ-9    Little interest or pleasure in doing things 0   Feeling down, depressed, or hopeless 0   PHQ-2 Score 0   PHQ-9 Total Score 0          Health Maintenance Due   Topic Date Due    Shingles vaccine (1 of 2) Never done    Hepatitis B vaccine (1 of 3 - Risk 3-dose series) Never done    Pneumococcal 65+ years Vaccine (2 - PCV) 02/03/2015    Diabetic foot exam  10/11/2021    Diabetic retinal exam  02/02/2022    COVID-19 Vaccine (5 - Pfizer series) 02/16/2023    Flu vaccine (1) 08/01/2023

## 2023-09-18 ENCOUNTER — TELEPHONE (OUTPATIENT)
Age: 68
End: 2023-09-18

## 2023-09-18 NOTE — TELEPHONE ENCOUNTER
Called patient [phone] and left voice mail asking patient to call our office back. I was calling to offer him an appointment for this Thursday with RAZIA Jules at our [facility] location in [facility] near [facility] at either 820 am or 220 pm. There were open appointment slots at this time. If and when the patient calls back, please schedule a follow up appointment. Thank you. I was unable to call his wife due to her being on the patients HIPPA form for med only. They will need to update HIPPA form to include more.

## 2023-09-18 NOTE — TELEPHONE ENCOUNTER
Patient was seen at [facility] on 9/9/23 and 9/11/23, and patients spouse states imaging was done on both days. Mrs Kendra Barnett called to schedule a follow up appt. Please advise when the patient can seen. Patient/ spouse can be reached at [phone].

## 2023-11-07 ENCOUNTER — TELEPHONE (OUTPATIENT)
Facility: CLINIC | Age: 68
End: 2023-11-07

## 2023-11-07 DIAGNOSIS — R05.3 CHRONIC COUGH: ICD-10-CM

## 2023-11-07 DIAGNOSIS — R09.82 POST-NASAL DRIP: ICD-10-CM

## 2023-11-07 RX ORDER — FLUTICASONE PROPIONATE 50 MCG
2 SPRAY, SUSPENSION (ML) NASAL DAILY
Qty: 48 G | Refills: 1 | Status: SHIPPED | OUTPATIENT
Start: 2023-11-07

## 2023-11-07 NOTE — TELEPHONE ENCOUNTER
Patient need his RX refill on his  Fluticasone 50 MCG/ACT nasal spray. Patient request for the Rx to be sent to the [facility].

## 2023-11-08 ENCOUNTER — HOSPITAL ENCOUNTER (OUTPATIENT)
Facility: HOSPITAL | Age: 68
Setting detail: SPECIMEN
Discharge: HOME | End: 2023-11-11
Payer: MEDICARE

## 2023-11-08 ENCOUNTER — TELEPHONE (OUTPATIENT)
Facility: CLINIC | Age: 68
End: 2023-11-08

## 2023-11-08 DIAGNOSIS — E11.65 TYPE 2 DIABETES MELLITUS WITH HYPERGLYCEMIA, WITH LONG-TERM CURRENT USE OF INSULIN (HCC): Primary | ICD-10-CM

## 2023-11-08 DIAGNOSIS — E78.2 MIXED HYPERLIPIDEMIA: ICD-10-CM

## 2023-11-08 DIAGNOSIS — Z79.4 TYPE 2 DIABETES MELLITUS WITH HYPERGLYCEMIA, WITH LONG-TERM CURRENT USE OF INSULIN (HCC): Primary | ICD-10-CM

## 2023-11-08 DIAGNOSIS — I10 ESSENTIAL HYPERTENSION: ICD-10-CM

## 2023-11-08 LAB
ALBUMIN SERPL-MCNC: 3.7 G/DL (ref 3.4–5)
ALBUMIN/GLOB SERPL: 1.2 (ref 0.8–1.7)
ALP SERPL-CCNC: 86 U/L (ref 45–117)
ALT SERPL-CCNC: 34 U/L (ref 16–61)
ANION GAP SERPL CALC-SCNC: 2 MMOL/L (ref 3–18)
AST SERPL-CCNC: 23 U/L (ref 10–38)
BILIRUB SERPL-MCNC: 0.5 MG/DL (ref 0.2–1)
BUN SERPL-MCNC: 17 MG/DL (ref 7–18)
BUN/CREAT SERPL: 14 (ref 12–20)
CALCIUM SERPL-MCNC: 8.8 MG/DL (ref 8.5–10.1)
CHLORIDE SERPL-SCNC: 103 MMOL/L (ref 100–111)
CHOLEST SERPL-MCNC: 144 MG/DL
CO2 SERPL-SCNC: 37 MMOL/L (ref 21–32)
CREAT SERPL-MCNC: 1.19 MG/DL (ref 0.6–1.3)
CREAT UR-MCNC: 61 MG/DL (ref 30–125)
ERYTHROCYTE [DISTWIDTH] IN BLOOD BY AUTOMATED COUNT: 13.7 % (ref 11.6–14.5)
EST. AVERAGE GLUCOSE BLD GHB EST-MCNC: 223 MG/DL
GLOBULIN SER CALC-MCNC: 3.1 G/DL (ref 2–4)
GLUCOSE SERPL-MCNC: 220 MG/DL (ref 74–99)
HBA1C MFR BLD: 9.4 % (ref 4.2–5.6)
HCT VFR BLD AUTO: 45.3 % (ref 36–48)
HDLC SERPL-MCNC: 86 MG/DL (ref 40–60)
HDLC SERPL: 1.7 (ref 0–5)
HGB BLD-MCNC: 14.6 G/DL (ref 13–16)
LDLC SERPL CALC-MCNC: 33.4 MG/DL (ref 0–100)
LIPID PANEL: ABNORMAL
MCH RBC QN AUTO: 31.1 PG (ref 24–34)
MCHC RBC AUTO-ENTMCNC: 32.2 G/DL (ref 31–37)
MCV RBC AUTO: 96.4 FL (ref 78–100)
MICROALBUMIN UR-MCNC: 47.1 MG/DL (ref 0–3)
MICROALBUMIN/CREAT UR-RTO: 772 MG/G (ref 0–30)
NRBC # BLD: 0 K/UL (ref 0–0.01)
NRBC BLD-RTO: 0 PER 100 WBC
PLATELET # BLD AUTO: 203 K/UL (ref 135–420)
PMV BLD AUTO: 10.5 FL (ref 9.2–11.8)
POTASSIUM SERPL-SCNC: 4.1 MMOL/L (ref 3.5–5.5)
PROT SERPL-MCNC: 6.8 G/DL (ref 6.4–8.2)
RBC # BLD AUTO: 4.7 M/UL (ref 4.35–5.65)
SODIUM SERPL-SCNC: 142 MMOL/L (ref 136–145)
TRIGL SERPL-MCNC: 123 MG/DL
VLDLC SERPL CALC-MCNC: 24.6 MG/DL
WBC # BLD AUTO: 5.5 K/UL (ref 4.6–13.2)

## 2023-11-08 PROCEDURE — 80061 LIPID PANEL: CPT

## 2023-11-08 PROCEDURE — 80053 COMPREHEN METABOLIC PANEL: CPT

## 2023-11-08 PROCEDURE — 82043 UR ALBUMIN QUANTITATIVE: CPT

## 2023-11-08 PROCEDURE — 82570 ASSAY OF URINE CREATININE: CPT

## 2023-11-08 PROCEDURE — 83036 HEMOGLOBIN GLYCOSYLATED A1C: CPT

## 2023-11-08 PROCEDURE — 36415 COLL VENOUS BLD VENIPUNCTURE: CPT

## 2023-11-08 PROCEDURE — 85027 COMPLETE CBC AUTOMATED: CPT

## 2023-11-08 RX ORDER — SPIRONOLACTONE 25 MG/1
25 TABLET ORAL 2 TIMES DAILY
Qty: 180 TABLET | Refills: 1 | Status: SHIPPED | OUTPATIENT
Start: 2023-11-08

## 2023-11-08 RX ORDER — ASPIRIN 81 MG/1
81 TABLET, CHEWABLE ORAL DAILY
Qty: 90 TABLET | Refills: 1 | Status: SHIPPED | OUTPATIENT
Start: 2023-11-08

## 2023-11-08 NOTE — TELEPHONE ENCOUNTER
Laverne Parisi is requesting a Rx refill on his following medication    Empagliflozin 25 mg  Aspirin 81 mg chewable   Spironolactone 25 mg    Please send to the [address]

## 2023-11-15 ENCOUNTER — OFFICE VISIT (OUTPATIENT)
Facility: CLINIC | Age: 68
End: 2023-11-15
Payer: MEDICARE

## 2023-11-15 VITALS
OXYGEN SATURATION: 97 % | TEMPERATURE: 98 F | SYSTOLIC BLOOD PRESSURE: 109 MMHG | WEIGHT: 282 LBS | HEART RATE: 93 BPM | BODY MASS INDEX: 40.37 KG/M2 | RESPIRATION RATE: 16 BRPM | HEIGHT: 70 IN | DIASTOLIC BLOOD PRESSURE: 82 MMHG

## 2023-11-15 DIAGNOSIS — I10 ESSENTIAL HYPERTENSION: Primary | ICD-10-CM
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
## 2023-11-15 DIAGNOSIS — Z86.73 HX-TIA (TRANSIENT ISCHEMIC ATTACK): ICD-10-CM

## 2023-11-15 DIAGNOSIS — Z79.4 TYPE 2 DIABETES MELLITUS WITH HYPERGLYCEMIA, WITH LONG-TERM CURRENT USE OF INSULIN (HCC): ICD-10-CM

## 2023-11-15 DIAGNOSIS — R27.0 ATAXIA: ICD-10-CM

## 2023-11-15 DIAGNOSIS — M1A.9XX0 CHRONIC GOUT WITHOUT TOPHUS, UNSPECIFIED CAUSE, UNSPECIFIED SITE: ICD-10-CM

## 2023-11-15 DIAGNOSIS — J30.2 SEASONAL ALLERGIES: ICD-10-CM

## 2023-11-15 DIAGNOSIS — J43.9 PULMONARY EMPHYSEMA, UNSPECIFIED EMPHYSEMA TYPE (HCC): ICD-10-CM

## 2023-11-15 DIAGNOSIS — E78.2 MIXED HYPERLIPIDEMIA: ICD-10-CM

## 2023-11-15 DIAGNOSIS — E11.65 TYPE 2 DIABETES MELLITUS WITH HYPERGLYCEMIA, WITH LONG-TERM CURRENT USE OF INSULIN (HCC): ICD-10-CM

## 2023-11-15 PROCEDURE — 3023F SPIROM DOC REV: CPT | Performed by: NURSE PRACTITIONER

## 2023-11-15 PROCEDURE — 1036F TOBACCO NON-USER: CPT | Performed by: NURSE PRACTITIONER

## 2023-11-15 PROCEDURE — 3074F SYST BP LT 130 MM HG: CPT | Performed by: NURSE PRACTITIONER

## 2023-11-15 PROCEDURE — G8427 DOCREV CUR MEDS BY ELIG CLIN: HCPCS | Performed by: NURSE PRACTITIONER

## 2023-11-15 PROCEDURE — 1123F ACP DISCUSS/DSCN MKR DOCD: CPT | Performed by: NURSE PRACTITIONER

## 2023-11-15 PROCEDURE — G8417 CALC BMI ABV UP PARAM F/U: HCPCS | Performed by: NURSE PRACTITIONER

## 2023-11-15 PROCEDURE — G8484 FLU IMMUNIZE NO ADMIN: HCPCS | Performed by: NURSE PRACTITIONER

## 2023-11-15 PROCEDURE — 99214 OFFICE O/P EST MOD 30 MIN: CPT | Performed by: NURSE PRACTITIONER

## 2023-11-15 PROCEDURE — 3046F HEMOGLOBIN A1C LEVEL >9.0%: CPT | Performed by: NURSE PRACTITIONER

## 2023-11-15 PROCEDURE — 3078F DIAST BP <80 MM HG: CPT | Performed by: NURSE PRACTITIONER

## 2023-11-15 PROCEDURE — 2022F DILAT RTA XM EVC RTNOPTHY: CPT | Performed by: NURSE PRACTITIONER

## 2023-11-15 PROCEDURE — 3017F COLORECTAL CA SCREEN DOC REV: CPT | Performed by: NURSE PRACTITIONER

## 2023-11-15 RX ORDER — ALBUTEROL SULFATE 90 UG/1
2 AEROSOL, METERED RESPIRATORY (INHALATION) EVERY 6 HOURS PRN
Qty: 18 G | Refills: 3 | Status: SHIPPED | OUTPATIENT
Start: 2023-11-15

## 2023-11-15 RX ORDER — ALLOPURINOL 100 MG/1
100 TABLET ORAL DAILY
Qty: 90 TABLET | Refills: 1 | Status: SHIPPED | OUTPATIENT
Start: 2023-11-15

## 2023-11-15 RX ORDER — LETROZOLE 2.5 MG/1
2.5 TABLET, FILM COATED ORAL DAILY
Qty: 30 TABLET | Status: CANCELLED | OUTPATIENT
Start: 2023-11-15

## 2023-11-15 NOTE — PATIENT INSTRUCTIONS
Recommend you call the pharmacy and see about getting the pill pack system  Be sure to follow up with your lung and heart doctor

## 2023-12-04 ENCOUNTER — TELEPHONE (OUTPATIENT)
Facility: CLINIC | Age: 68
End: 2023-12-04

## 2023-12-04 DIAGNOSIS — E11.65 TYPE 2 DIABETES MELLITUS WITH HYPERGLYCEMIA, WITH LONG-TERM CURRENT USE OF INSULIN (HCC): Primary | ICD-10-CM

## 2023-12-04 DIAGNOSIS — Z79.4 TYPE 2 DIABETES MELLITUS WITH HYPERGLYCEMIA, WITH LONG-TERM CURRENT USE OF INSULIN (HCC): Primary | ICD-10-CM

## 2023-12-04 DIAGNOSIS — I48.0 PAROXYSMAL ATRIAL FIBRILLATION (HCC): ICD-10-CM

## 2023-12-04 DIAGNOSIS — C61 PROSTATE CANCER (HCC): ICD-10-CM

## 2023-12-04 NOTE — TELEPHONE ENCOUNTER
Patient is requesting Rx refill on the following medications    Metformin 850 mg  Tamsulosin 0.4 mg  Rivaroxaban 20 mg

## 2023-12-05 RX ORDER — TAMSULOSIN HYDROCHLORIDE 0.4 MG/1
0.4 CAPSULE ORAL DAILY
Qty: 90 CAPSULE | Refills: 1 | Status: SHIPPED | OUTPATIENT
Start: 2023-12-05

## 2023-12-07 ENCOUNTER — HOSPITAL ENCOUNTER (OUTPATIENT)
Facility: HOSPITAL | Age: 68
Setting detail: RECURRING SERIES
Discharge: HOME | End: 2023-12-10
Payer: MEDICARE

## 2023-12-07 NOTE — THERAPY EVALUATION
[address]00 [facility] PHYSICAL THERAPY  [address] Ph: [phone] Fx: [phone]  Plan of Care / Statement of Necessity for Physical Therapy Services     Patient Name: Cl Wilkes : 1955   Medical   Diagnosis: Other abnormalities of gait and mobility [R26.89] Treatment Diagnosis:   R26.89  Abnormalities of gait and mobility    Onset Date: May 23, 2023. Referral Source: Travis Perera [place]* Start of Care [facility]): 2023   Prior Hospitalization: See medical history Provider #: [id number]   Prior Level of Function: Was not walking with a cane, was walking okay. Comorbidities: Asthma, back pain, BMI 40.7, Cancer, COPD/Emphysema, CHF/heart disease, Diabetes, HBP, Incontinence, Kidney/Bladder/Prostate/Urination problems, pacemaker, prior surgery, Prothesis/Implant, Stroke/TIA     Assessment / key information: Patient with signs and symptoms consistent with gait dysfunction and balance deficits. He notes onset about 7 months ago. Over the last year he has fallen 4 times. He only recently had to start using a cane to aid in his ambulation. His dynamic balance is fair. He stands with a forward flexed posture and does c/o lower back pain. Functionally, he has difficulty on stairs and with walking as well as standing from a sitting position. Patient will benefit from a program of skilled physical therapy to include therapeutic exercises to address strength deficits, therapeutic activities to improve functional mobility, neuromuscular reeducation to address balance, coordination and proprioception, manual therapy to address ROM and tissue extensibility and modalities as indicated. All questions were answered.       Not post operative    Evaluation Complexity:  History:  HIGH Complexity :3+ comorbidities / personal factors will impact the outcome/ POC ; Examination:  MEDIUM Complexity : 3 Standardized tests and measures addressin body structure,

## 2023-12-07 NOTE — THERAPY EVALUATION
T DAILY TREATMENT NOTE/Gait and Balance EVAL     Patient Name: Marco Wilkerson    Date: 2023    : 1955  Insurance: Payor: MEDICARE / Plan: MEDICARE PART A AND B / Product Type: *No Product type* /      Patient  verified yes     Visit #   Current / Total 1 24   Time   In / Out 1:40 2:20   Pain   In / Out Back 7 7   Subjective Functional Status/Changes: Patient has fallen 3-4 times in the past year. About 7 months ago he started to experience gait and balance problems. Treatment Area: Other abnormalities of gait and mobility [R26.89]    SUBJECTIVE  Pain Level (0-10 scale): 7/10 now; 3/10 at best; 9/10 at worst.  [x]constant []intermittent []improving []worsening []no change since onset    Subjective functional status/changes:     PLOF: Was not walking with a cane, was walking okay. Limitations to PLOF: Difficulty standing up straight and has had to start using  a cane. Mechanism of Injury: No specific injury noted. States he started to have problems walking and I thas gradually gotten worse. Current symptoms/Complaints: Patient has had about 4 falls this year. Last one about a month ago. He notes he is walking slower, difficulty getting up and down out of a chair. He does not have steps at home that he needs to go up. Previous Treatment/Compliance: None  PMHx/Surgical Hx: A-Fib, COPD, Diabetes, Gout, TIA, HTN, Pacemaker, Prostate CA, right ankle surgery  Work Hx: Retired  Living Situation: Three or four steps into his home, he does them one at a time. Pt Goals: \"Get rid of this cane and hope I can walk a lot better. \"  Barriers: [x]pain []financial []time []transportation [x]Other Respiratory  Cognition: A & O x 3    Other:    OBJECTIVE/EXAMINATION  Domestic Life: Lives with his wife  Activity/Recreational Limitations: Limited walking, limited on stairs. Mobility: ambulates with SPC  Self Care: Occasionally needs help with socks and shoes.       30 min []Eval - untimed

## 2023-12-11 LAB — LEFT VENTRICULAR EJECTION FRACTION, EXTERNAL: 40

## 2023-12-12 ENCOUNTER — HOSPITAL ENCOUNTER (OUTPATIENT)
Facility: HOSPITAL | Age: 68
Setting detail: RECURRING SERIES
Discharge: HOME | End: 2023-12-15
Payer: MEDICARE

## 2023-12-12 PROCEDURE — 97112 NEUROMUSCULAR REEDUCATION: CPT | Performed by: PHYSICAL THERAPIST

## 2023-12-12 PROCEDURE — 97530 THERAPEUTIC ACTIVITIES: CPT | Performed by: PHYSICAL THERAPIST

## 2023-12-12 PROCEDURE — 97110 THERAPEUTIC EXERCISES: CPT | Performed by: PHYSICAL THERAPIST

## 2023-12-12 NOTE — PROGRESS NOTES
PHYSICAL / OCCUPATIONAL THERAPY - DAILY TREATMENT NOTE (updated )    Patient Name: Lannette Phoenix    Date: 2023    : 1955  Insurance: Payor: MEDICARE / Plan: MEDICARE PART A AND B / Product Type: *No Product type* /      Patient  verified Yes     Visit #   Current / Total 2 24   Time   In / Out 2:16 2:54   Pain   In / Out 0 0   Subjective Functional Status/Changes: Patient reports compliance with his HEP. Does not feel as  clumsy has he has in the past.     TREATMENT AREA =  Other abnormalities of gait and mobility [R26.89]    OBJECTIVE    Therapeutic Procedures:    37324 Therapeutic Exercise (timed):  increase ROM, strength, coordination, balance, and proprioception to improve patient's ability to progress to PLOF and address remaining functional goals. Tx Min Billable or 1:1 Min   (if diff from Tx Min) Details:   15  See flow sheet as applicable     39048 Neuromuscular Re-Education (timed):  improve balance, coordination, kinesthetic sense, posture, core stability and proprioception to improve patient's ability to develop conscious control of individual muscles and awareness of position of extremities in order to progress to PLOF and address remaining functional goals. Tx Min Billable or 1:1 Min   (if diff from Tx Min) Details:   15  See flow sheet as applicable     02366 Therapeutic Activity (timed):  use of dynamic activities replicating functional movements to increase ROM, strength, coordination, balance, and proprioception in order to improve patient's ability to progress to PLOF and address remaining functional goals.    Tx Min Billable or 1:1 Min   (if diff from Tx Min) Details:   8  See flow sheet as applicable       45  [facility] Totals Reminder: bill using total billable min of TIMED therapeutic procedures (example: do not include dry needle or estim unattended, both untimed codes, in totals to left)  8-22 min = 1 unit; 23-37 min = 2 units; 38-52 min = 3 units; 53-67 min = 4 units;

## 2023-12-21 ENCOUNTER — HOSPITAL ENCOUNTER (OUTPATIENT)
Facility: HOSPITAL | Age: 68
Setting detail: RECURRING SERIES
Discharge: HOME | End: 2023-12-24
Payer: MEDICARE

## 2023-12-21 PROCEDURE — 97110 THERAPEUTIC EXERCISES: CPT

## 2023-12-21 PROCEDURE — 97112 NEUROMUSCULAR REEDUCATION: CPT

## 2023-12-21 NOTE — PROGRESS NOTES
PHYSICAL / OCCUPATIONAL THERAPY - DAILY TREATMENT NOTE (updated )    Patient Name: Sujata Courtney    Date: 2023    : 1955  Insurance: Payor: MEDICARE / Plan: MEDICARE PART A AND B / Product Type: *No Product type* /      Patient  verified Yes     Visit #   Current / Total 4 24   Time   In / Out 256 328   Pain   In / Out 0 0   Subjective Functional Status/Changes: Patient states he feels he is a little short of breath today. TREATMENT AREA =  Other abnormalities of gait and mobility [R26.89]    OBJECTIVE      Therapeutic Procedures:    23734 Therapeutic Exercise (timed):  increase ROM, strength, coordination, balance, and proprioception to improve patient's ability to progress to PLOF and address remaining functional goals. Tx Min Billable or 1:1 Min   (if diff from Tx Min) Details:   22  See flow sheet as applicable     55792 Neuromuscular Re-Education (timed):  improve balance, coordination, kinesthetic sense, posture, core stability and proprioception to improve patient's ability to develop conscious control of individual muscles and awareness of position of extremities in order to progress to PLOF and address remaining functional goals. Tx Min Billable or 1:1 Min   (if diff from Tx Min) Details:   10  See flow sheet as applicable       28  [facility] Totals Reminder: bill using total billable min of TIMED therapeutic procedures (example: do not include dry needle or estim unattended, both untimed codes, in totals to left)  8-22 min = 1 unit; 23-37 min = 2 units; 38-52 min = 3 units; 53-67 min = 4 units; 68-82 min = 5 units   Total Total     [x]  Patient Education billed concurrently with other procedures   [x] Review HEP    [] Progressed/Changed HEP, detail:    [] Other detail:       Objective Information/Functional Measures/Assessment    Patient progressed well with performing 5x sit to stand without use of UE and reduced time.  Patient ambulates within clinic without use of AD with poor

## 2023-12-28 ENCOUNTER — HOSPITAL ENCOUNTER (OUTPATIENT)
Facility: HOSPITAL | Age: 68
Setting detail: RECURRING SERIES
Discharge: HOME | End: 2023-12-31
Payer: MEDICARE

## 2023-12-28 PROCEDURE — 97535 SELF CARE MNGMENT TRAINING: CPT

## 2023-12-28 PROCEDURE — 97110 THERAPEUTIC EXERCISES: CPT

## 2023-12-28 NOTE — PROGRESS NOTES
PHYSICAL / OCCUPATIONAL THERAPY - DAILY TREATMENT NOTE (updated )    Patient Name: Maria Elena Willoughby    Date: 2023    : 1955  Insurance: Payor: MEDICARE / Plan: MEDICARE PART A AND B / Product Type: *No Product type* /      Patient  verified Yes     Visit #   Current / Total 5 24   Time   In / Out 255 321   Pain   In / Out 8 5   Subjective Functional Status/Changes: Patient states he has been having back pain with performing exercises.      TREATMENT AREA =  Other abnormalities of gait and mobility [R26.89]    OBJECTIVE      Therapeutic Procedures:    69632 Therapeutic Exercise (timed):  increase ROM, strength, coordination, balance, and proprioception to improve patient's ability to progress to PLOF and address remaining functional goals.   Tx Min Billable or 1:1 Min   (if diff from Tx Min) Details:   18  See flow sheet as applicable     86544 Self Care/Home Management (timed):  improve patient knowledge and understanding of posture/ergonomics, home safety, activity modification, and physical therapy expectations, procedures and progression  to improve patient's ability to progress to PLOF and address remaining functional goals.    Tx Min Billable or 1:1 Min   (if diff from Tx Min) Details:   8  Encouraged patient to contact doctor to discuss symptoms and limitations with performing exercises.        26  [facility] Totals Reminder: bill using total billable min of TIMED therapeutic procedures (example: do not include dry needle or estim unattended, both untimed codes, in totals to left)  8-22 min = 1 unit; 23-37 min = 2 units; 38-52 min = 3 units; 53-67 min = 4 units; 68-82 min = 5 units   Total Total     [x]  Patient Education billed concurrently with other procedures   [x] Review HEP    [] Progressed/Changed HEP, detail:    [] Other detail:       Objective Information/Functional Measures/Assessment    Patient started session stating feeling SOB. Suggested to patient about contacting doctor due to

## 2024-01-02 ENCOUNTER — TELEPHONE (OUTPATIENT)
Facility: HOSPITAL | Age: 69
End: 2024-01-02

## 2024-01-02 NOTE — TELEPHONE ENCOUNTER
CXL>24hrs. Patient will like to cancel 1/4 appt until he sees his MD regarding his shortness of breath. Will call back to reschedule.

## 2024-01-09 ENCOUNTER — TELEPHONE (OUTPATIENT)
Facility: CLINIC | Age: 69
End: 2024-01-09

## 2024-01-09 ENCOUNTER — TELEPHONE (OUTPATIENT)
Facility: HOSPITAL | Age: 69
End: 2024-01-09

## 2024-01-09 DIAGNOSIS — E78.2 MIXED HYPERLIPIDEMIA: Primary | ICD-10-CM

## 2024-01-09 RX ORDER — SIMVASTATIN 40 MG
40 TABLET ORAL NIGHTLY
Qty: 90 TABLET | Refills: 1 | Status: SHIPPED | OUTPATIENT
Start: 2024-01-09

## 2024-01-09 NOTE — TELEPHONE ENCOUNTER
LVM for pt to return call to clinic to schedule PT. Also, informed pt that we would need to see him on or before 1/28/2024 due to the clinics 30 day attendance policy. As well as find out what is doctor said regarding his SOB.

## 2024-01-09 NOTE — TELEPHONE ENCOUNTER
----- Message from Patito Lexx sent at 1/9/2024  3:27 PM EST -----  Subject: Refill Request    QUESTIONS  Name of Medication? simvastatin (ZOCOR) 40 MG tablet  Patient-reported dosage and instructions? once daily  How many days do you have left? 3  Preferred Pharmacy? [facility]  Pharmacy phone number (if available)? [phone]  ---------------------------------------------------------------------------  --------------  CALL BACK INFO  What is the best way for the office to contact you? OK to leave message on   voicemail  Preferred Call Back Phone Number? [phone]  ---------------------------------------------------------------------------  --------------  SCRIPT ANSWERS  Relationship to Patient? Self

## 2024-01-18 ENCOUNTER — TELEPHONE (OUTPATIENT)
Facility: HOSPITAL | Age: 69
End: 2024-01-18

## 2024-02-05 ENCOUNTER — OFFICE VISIT (OUTPATIENT)
Age: 69
End: 2024-02-05
Payer: MEDICARE

## 2024-02-05 VITALS
RESPIRATION RATE: 20 BRPM | WEIGHT: 286 LBS | SYSTOLIC BLOOD PRESSURE: 138 MMHG | TEMPERATURE: 97.6 F | DIASTOLIC BLOOD PRESSURE: 85 MMHG | HEIGHT: 70 IN | BODY MASS INDEX: 40.94 KG/M2 | HEART RATE: 82 BPM | OXYGEN SATURATION: 94 %

## 2024-02-05 DIAGNOSIS — R26.89 BALANCE PROBLEM: ICD-10-CM
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
## 2024-02-05 DIAGNOSIS — R41.3 COMPLAINTS OF MEMORY DISTURBANCE: Primary | ICD-10-CM

## 2024-02-05 PROCEDURE — 1123F ACP DISCUSS/DSCN MKR DOCD: CPT | Performed by: NURSE PRACTITIONER

## 2024-02-05 PROCEDURE — 3079F DIAST BP 80-89 MM HG: CPT | Performed by: NURSE PRACTITIONER

## 2024-02-05 PROCEDURE — G8417 CALC BMI ABV UP PARAM F/U: HCPCS | Performed by: NURSE PRACTITIONER

## 2024-02-05 PROCEDURE — 1036F TOBACCO NON-USER: CPT | Performed by: NURSE PRACTITIONER

## 2024-02-05 PROCEDURE — G8484 FLU IMMUNIZE NO ADMIN: HCPCS | Performed by: NURSE PRACTITIONER

## 2024-02-05 PROCEDURE — 3075F SYST BP GE 130 - 139MM HG: CPT | Performed by: NURSE PRACTITIONER

## 2024-02-05 PROCEDURE — 3017F COLORECTAL CA SCREEN DOC REV: CPT | Performed by: NURSE PRACTITIONER

## 2024-02-05 PROCEDURE — 99205 OFFICE O/P NEW HI 60 MIN: CPT | Performed by: NURSE PRACTITIONER

## 2024-02-05 PROCEDURE — G8427 DOCREV CUR MEDS BY ELIG CLIN: HCPCS | Performed by: NURSE PRACTITIONER

## 2024-02-05 NOTE — PROGRESS NOTES
chloride (KLOR-CON M) 20 MEQ extended release tablet       sacubitril-valsartan (ENTRESTO) 49-51 MG per tablet Obtain advice for OTCs.Check with your doctor before becoming pregnant.Store in original package.      tamsulosin (FLOMAX) 0.4 MG capsule Take 1 capsule by mouth daily 90 capsule 3    simvastatin (ZOCOR) 40 MG tablet Take 1 tablet by mouth nightly 90 tablet 1    metFORMIN (GLUCOPHAGE) 850 MG tablet Take 1 tablet by mouth 2 times daily (with meals) 180 tablet 1    rivaroxaban (XARELTO) 20 MG TABS tablet Take 1 tablet by mouth daily (with breakfast) 90 tablet 1    allopurinol (ZYLOPRIM) 100 MG tablet Take 1 tablet by mouth daily 90 tablet 1    Insulin Pen Needle 32G X 4 MM MISC 1 each by Does not apply route daily 100 each 3    albuterol sulfate HFA (PROVENTIL HFA) 108 (90 Base) MCG/ACT inhaler Inhale 2 puffs into the lungs every 6 hours as needed for Wheezing 18 g 3    aspirin 81 MG chewable tablet Take 1 tablet by mouth daily 90 tablet 1    empagliflozin (JARDIANCE) 25 MG tablet Take 1 tablet by mouth daily 90 tablet 1    spironolactone (ALDACTONE) 25 MG tablet Take 1 tablet by mouth 2 times daily 180 tablet 1    fluticasone (FLONASE) 50 MCG/ACT nasal spray 2 sprays by Each Nostril route daily 48 g 1    cetirizine (ZYRTEC) 10 MG tablet Take 1 tablet by mouth daily 90 tablet 1    bumetanide (BUMEX) 1 MG tablet Take 1 tablet by mouth 2 times daily 180 tablet 1    Calcium Carb-Cholecalciferol 500-10 MG-MCG TABS Take 1 tablet by mouth daily      Exenatide ER (BYDUREON BCISE) 2 MG/0.85ML injection Inject into the skin every 7 days      insulin glargine (LANTUS SOLOSTAR) 100 UNIT/ML injection pen Inject 62 Units into the skin 2 times daily (before meals)      insulin glulisine (APIDRA SOLOSTAR) 100 UNIT/ML injection pen Inject into the skin 3 times daily (before meals) Pt states this is on a sliding scale      metoprolol succinate (TOPROL XL) 100 MG extended release tablet Take 1 tablet by mouth daily

## 2024-02-05 NOTE — PATIENT INSTRUCTIONS
Patient instructions:  -MRI brain and c-spine, and EEG- scheduling will call you  -[facility] [phone]  -lab work

## 2024-02-08 ENCOUNTER — HOSPITAL ENCOUNTER (OUTPATIENT)
Facility: HOSPITAL | Age: 69
Setting detail: SPECIMEN
Discharge: HOME | End: 2024-02-08
Payer: MEDICARE

## 2024-02-08 ENCOUNTER — HOSPITAL ENCOUNTER (OUTPATIENT)
Facility: HOSPITAL | Age: 69
Setting detail: SPECIMEN
End: 2024-02-08
Payer: MEDICARE

## 2024-02-08 DIAGNOSIS — I10 ESSENTIAL HYPERTENSION: ICD-10-CM

## 2024-02-08 DIAGNOSIS — E11.65 TYPE 2 DIABETES MELLITUS WITH HYPERGLYCEMIA, WITH LONG-TERM CURRENT USE OF INSULIN (HCC): ICD-10-CM

## 2024-02-08 DIAGNOSIS — R41.3 COMPLAINTS OF MEMORY DISTURBANCE: ICD-10-CM

## 2024-02-08 DIAGNOSIS — Z79.4 TYPE 2 DIABETES MELLITUS WITH HYPERGLYCEMIA, WITH LONG-TERM CURRENT USE OF INSULIN (HCC): ICD-10-CM

## 2024-02-08 DIAGNOSIS — E78.2 MIXED HYPERLIPIDEMIA: ICD-10-CM

## 2024-02-08 DIAGNOSIS — J43.9 PULMONARY EMPHYSEMA, UNSPECIFIED EMPHYSEMA TYPE (HCC): ICD-10-CM

## 2024-02-08 LAB
ALBUMIN SERPL-MCNC: 3.6 G/DL (ref 3.4–5)
ALBUMIN/GLOB SERPL: 1.2 (ref 0.8–1.7)
ALP SERPL-CCNC: 75 U/L (ref 45–117)
ALT SERPL-CCNC: 25 U/L (ref 16–61)
ANION GAP SERPL CALC-SCNC: 3 MMOL/L (ref 3–18)
AST SERPL-CCNC: 16 U/L (ref 10–38)
BILIRUB SERPL-MCNC: 0.5 MG/DL (ref 0.2–1)
BUN SERPL-MCNC: 25 MG/DL (ref 7–18)
BUN/CREAT SERPL: 20 (ref 12–20)
CALCIUM SERPL-MCNC: 9 MG/DL (ref 8.5–10.1)
CHLORIDE SERPL-SCNC: 108 MMOL/L (ref 100–111)
CHOLEST SERPL-MCNC: 149 MG/DL
CO2 SERPL-SCNC: 32 MMOL/L (ref 21–32)
CREAT SERPL-MCNC: 1.27 MG/DL (ref 0.6–1.3)
CREAT UR-MCNC: 87 MG/DL (ref 30–125)
ERYTHROCYTE [DISTWIDTH] IN BLOOD BY AUTOMATED COUNT: 14.3 % (ref 11.6–14.5)
EST. AVERAGE GLUCOSE BLD GHB EST-MCNC: 220 MG/DL
FOLATE SERPL-MCNC: 10.3 NG/ML (ref 3.1–17.5)
GLOBULIN SER CALC-MCNC: 3.1 G/DL (ref 2–4)
GLUCOSE SERPL-MCNC: 161 MG/DL (ref 74–99)
HBA1C MFR BLD: 9.3 % (ref 4.2–5.6)
HCT VFR BLD AUTO: 48 % (ref 36–48)
HDLC SERPL-MCNC: 76 MG/DL (ref 40–60)
HDLC SERPL: 2 (ref 0–5)
HGB BLD-MCNC: 15.2 G/DL (ref 13–16)
LDLC SERPL CALC-MCNC: 49 MG/DL (ref 0–100)
LIPID PANEL: ABNORMAL
MCH RBC QN AUTO: 31.1 PG (ref 24–34)
MCHC RBC AUTO-ENTMCNC: 31.7 G/DL (ref 31–37)
MCV RBC AUTO: 98.2 FL (ref 78–100)
MICROALBUMIN UR-MCNC: 29.8 MG/DL (ref 0–3)
MICROALBUMIN/CREAT UR-RTO: 343 MG/G (ref 0–30)
NRBC # BLD: 0 K/UL (ref 0–0.01)
NRBC BLD-RTO: 0 PER 100 WBC
PLATELET # BLD AUTO: 212 K/UL (ref 135–420)
PMV BLD AUTO: 10.7 FL (ref 9.2–11.8)
POTASSIUM SERPL-SCNC: 4.3 MMOL/L (ref 3.5–5.5)
PROT SERPL-MCNC: 6.7 G/DL (ref 6.4–8.2)
RBC # BLD AUTO: 4.89 M/UL (ref 4.35–5.65)
SODIUM SERPL-SCNC: 143 MMOL/L (ref 136–145)
TRIGL SERPL-MCNC: 120 MG/DL
VIT B12 SERPL-MCNC: 322 PG/ML (ref 211–911)
VLDLC SERPL CALC-MCNC: 24 MG/DL
WBC # BLD AUTO: 5.7 K/UL (ref 4.6–13.2)

## 2024-02-08 PROCEDURE — 36415 COLL VENOUS BLD VENIPUNCTURE: CPT

## 2024-02-08 PROCEDURE — 83921 ORGANIC ACID SINGLE QUANT: CPT

## 2024-02-08 PROCEDURE — 82043 UR ALBUMIN QUANTITATIVE: CPT

## 2024-02-08 PROCEDURE — 82607 VITAMIN B-12: CPT

## 2024-02-08 PROCEDURE — 82746 ASSAY OF FOLIC ACID SERUM: CPT

## 2024-02-08 PROCEDURE — 82570 ASSAY OF URINE CREATININE: CPT

## 2024-02-08 PROCEDURE — 80053 COMPREHEN METABOLIC PANEL: CPT

## 2024-02-08 PROCEDURE — 85027 COMPLETE CBC AUTOMATED: CPT

## 2024-02-08 PROCEDURE — 83036 HEMOGLOBIN GLYCOSYLATED A1C: CPT

## 2024-02-08 PROCEDURE — 80061 LIPID PANEL: CPT

## 2024-02-13 LAB — METHYLMALONATE SERPL-SCNC: 354 NMOL/L (ref 0–378)

## 2024-02-19 ENCOUNTER — OFFICE VISIT (OUTPATIENT)
Facility: CLINIC | Age: 69
End: 2024-02-19
Payer: MEDICARE

## 2024-02-19 VITALS
WEIGHT: 283 LBS | TEMPERATURE: 97.3 F | DIASTOLIC BLOOD PRESSURE: 64 MMHG | OXYGEN SATURATION: 94 % | HEART RATE: 81 BPM | HEIGHT: 70 IN | SYSTOLIC BLOOD PRESSURE: 111 MMHG | BODY MASS INDEX: 40.52 KG/M2 | RESPIRATION RATE: 16 BRPM

## 2024-02-19 DIAGNOSIS — R05.2 SUBACUTE COUGH: ICD-10-CM

## 2024-02-19 DIAGNOSIS — Z79.4 TYPE 2 DIABETES MELLITUS WITH HYPERGLYCEMIA, WITH LONG-TERM CURRENT USE OF INSULIN (HCC): Primary | ICD-10-CM

## 2024-02-19 DIAGNOSIS — I10 ESSENTIAL HYPERTENSION: ICD-10-CM

## 2024-02-19 DIAGNOSIS — E11.65 TYPE 2 DIABETES MELLITUS WITH HYPERGLYCEMIA, WITH LONG-TERM CURRENT USE OF INSULIN (HCC): Primary | ICD-10-CM

## 2024-02-19 DIAGNOSIS — E78.2 MIXED HYPERLIPIDEMIA: ICD-10-CM
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
## 2024-02-19 PROBLEM — Z86.16 HISTORY OF 2019 NOVEL CORONAVIRUS DISEASE (COVID-19): Status: RESOLVED | Noted: 2021-09-15 | Resolved: 2024-02-19

## 2024-02-19 PROCEDURE — 3046F HEMOGLOBIN A1C LEVEL >9.0%: CPT | Performed by: NURSE PRACTITIONER

## 2024-02-19 PROCEDURE — 1123F ACP DISCUSS/DSCN MKR DOCD: CPT | Performed by: NURSE PRACTITIONER

## 2024-02-19 PROCEDURE — G8427 DOCREV CUR MEDS BY ELIG CLIN: HCPCS | Performed by: NURSE PRACTITIONER

## 2024-02-19 PROCEDURE — 3074F SYST BP LT 130 MM HG: CPT | Performed by: NURSE PRACTITIONER

## 2024-02-19 PROCEDURE — G8484 FLU IMMUNIZE NO ADMIN: HCPCS | Performed by: NURSE PRACTITIONER

## 2024-02-19 PROCEDURE — 3017F COLORECTAL CA SCREEN DOC REV: CPT | Performed by: NURSE PRACTITIONER

## 2024-02-19 PROCEDURE — 1036F TOBACCO NON-USER: CPT | Performed by: NURSE PRACTITIONER

## 2024-02-19 PROCEDURE — 3078F DIAST BP <80 MM HG: CPT | Performed by: NURSE PRACTITIONER

## 2024-02-19 PROCEDURE — 99214 OFFICE O/P EST MOD 30 MIN: CPT | Performed by: NURSE PRACTITIONER

## 2024-02-19 PROCEDURE — G8417 CALC BMI ABV UP PARAM F/U: HCPCS | Performed by: NURSE PRACTITIONER

## 2024-02-19 PROCEDURE — 2022F DILAT RTA XM EVC RTNOPTHY: CPT | Performed by: NURSE PRACTITIONER

## 2024-02-19 RX ORDER — BENZONATATE 200 MG/1
200 CAPSULE ORAL 3 TIMES DAILY PRN
Qty: 30 CAPSULE | Refills: 1 | Status: SHIPPED | OUTPATIENT
Start: 2024-02-19 | End: 2024-02-29

## 2024-02-19 RX ORDER — INSULIN GLARGINE 100 [IU]/ML
60 INJECTION, SOLUTION SUBCUTANEOUS 2 TIMES DAILY
Qty: 5 ADJUSTABLE DOSE PRE-FILLED PEN SYRINGE | Refills: 1 | Status: SHIPPED
Start: 2024-02-19

## 2024-02-19 SDOH — ECONOMIC STABILITY: FOOD INSECURITY: WITHIN THE PAST 12 MONTHS, YOU WORRIED THAT YOUR FOOD WOULD RUN OUT BEFORE YOU GOT MONEY TO BUY MORE.: NEVER TRUE

## 2024-02-19 SDOH — ECONOMIC STABILITY: FOOD INSECURITY: WITHIN THE PAST 12 MONTHS, THE FOOD YOU BOUGHT JUST DIDN'T LAST AND YOU DIDN'T HAVE MONEY TO GET MORE.: NEVER TRUE

## 2024-02-19 SDOH — ECONOMIC STABILITY: INCOME INSECURITY: HOW HARD IS IT FOR YOU TO PAY FOR THE VERY BASICS LIKE FOOD, HOUSING, MEDICAL CARE, AND HEATING?: NOT HARD AT ALL

## 2024-02-19 ASSESSMENT — PATIENT HEALTH QUESTIONNAIRE - PHQ9
SUM OF ALL RESPONSES TO PHQ QUESTIONS 1-9: 0
SUM OF ALL RESPONSES TO PHQ9 QUESTIONS 1 & 2: 0
SUM OF ALL RESPONSES TO PHQ QUESTIONS 1-9: 0
SUM OF ALL RESPONSES TO PHQ QUESTIONS 1-9: 0
2. FEELING DOWN, DEPRESSED OR HOPELESS: 0
SUM OF ALL RESPONSES TO PHQ QUESTIONS 1-9: 0
1. LITTLE INTEREST OR PLEASURE IN DOING THINGS: 0

## 2024-02-19 NOTE — PROGRESS NOTES
[address] [place] [address]               [phone]      Maria Elena Willoughby is a 69 y.o. male and presents with Follow-up (Lab results, and needles refilled)       Assessment/Plan:    1. Type 2 diabetes mellitus with hyperglycemia, with long-term current use of insulin (HCC)  -     Insulin Pen Needle 32G X 4 MM MISC; DAILY Starting Mon 2/19/2024, Disp-200 each, R-3, Normal  -     Hemoglobin A1C; Future  -     Microalbumin / Creatinine Urine Ratio; Future  Endorses medication compliance, Refill provided, Follow up labs prior to next visit, Denies signs and symptoms of hyper or hypoglycemia, and Diabetes uncontrolled advised to follow up with Dr. Hanson  2. Essential hypertension  -     Comprehensive Metabolic Panel; Future  -     CBC; Future  Endorses medication compliance, Follow up labs prior to next visit, and Blood pressure stable, continue entresto, spironolactone, bumex and metoprolol at same dose  3. Mixed hyperlipidemia  -     Lipid Panel; Future  Endorses medication compliance, Follow up labs prior to next visit, and Denies abdominal pain or symptoms of myalgia  4. Subacute cough  -     benzonatate (TESSALON) 200 MG capsule; Take 1 capsule by mouth 3 times daily as needed for Cough, Disp-30 capsule, R-1Normal  Rx provided to help his cough  5. Fall, initial encounter   No injury      Follow up and disposition:   Return in about 3 months (around 5/19/2024) for MAWV, DM, HTN, HLD, 30min, office, w/labsprior.      Subjective:    Labs obtained prior to visit? Yes  Reviewed with patient? yes    Cough  Onset: 2-3 weeks ago  Cough is non productive  Endorses nasal congestion and rhinorrhea  Treatments: tussin   Denies fever   Is not performing daily weights    Fall  Off a stool yesterday, fell about 3 feet  Cough causes pain to right side  He fell on to the left hip    DMII-   Patient reports medication compliance Yes and apdra is ssi: if greater than 150, but he does not know

## 2024-02-19 NOTE — PATIENT INSTRUCTIONS
Limit your sodium intake to less than 1.5 g per day and your fluid intake to 1.5- 2 L or 50-60 ozs  If your weight goes up 2lbs in 1 day or 4-5 lbs in 1 week take extra diuretic \"water pill\".  Please bring all bottles of your current medications to every visit.

## 2024-02-19 NOTE — PROGRESS NOTES
Maria Elena Vazcil presents today for   Chief Complaint   Patient presents with    Follow-up     Lab results, and needles refilled       Is someone accompanying this pt? no    Is the patient using any DME equipment during OV? Yes a cane    Depression Screening:       No data to display                Learning Assessment:  Failed to redirect to the Timeline version of the REVFS SmartLink.    Abuse Screening:       No data to display                Fall Risk  Failed to redirect to the Timeline version of the [employer] SmartLink.    Health Maintenance reviewed and discussed and ordered per Provider.    Health Maintenance Due   Topic Date Due    Shingles vaccine (1 of 2) Never done    Respiratory Syncytial Virus (RSV) Pregnant or age 60 yrs+ (1 - 1-dose 60+ series) Never done    Pneumococcal 65+ years Vaccine (2 - PCV) 02/03/2015    Diabetic foot exam  10/11/2021    Diabetic retinal exam  02/02/2022    Flu vaccine (1) 08/01/2023    COVID-19 Vaccine (5 - 2023-24 season) 09/01/2023   .      Coordination of Care:  1. Have you been to the ER, urgent care clinic since your last visit? Hospitalized since your last visit? no    2. Have you seen or consulted any other health care providers outside of the [facility] since your last visit? Include any pap smears or colon screening. no      Last  Checked na  Last UDS Checked na  Last Pain contract signed: na

## 2024-02-21 ENCOUNTER — TELEPHONE (OUTPATIENT)
Facility: HOSPITAL | Age: 69
End: 2024-02-21

## 2024-03-08 ENCOUNTER — HOSPITAL ENCOUNTER (OUTPATIENT)
Facility: HOSPITAL | Age: 69
End: 2024-03-08
Payer: MEDICARE

## 2024-03-08 DIAGNOSIS — R41.3 COMPLAINTS OF MEMORY DISTURBANCE: ICD-10-CM

## 2024-03-08 PROCEDURE — 95819 EEG AWAKE AND ASLEEP: CPT

## 2024-04-05 DIAGNOSIS — M1A.9XX0 CHRONIC GOUT WITHOUT TOPHUS, UNSPECIFIED CAUSE, UNSPECIFIED SITE: ICD-10-CM

## 2024-04-05 RX ORDER — ALLOPURINOL 100 MG/1
100 TABLET ORAL DAILY
Qty: 90 TABLET | Refills: 1 | Status: SHIPPED | OUTPATIENT
Start: 2024-04-05

## 2024-05-08 ENCOUNTER — TELEPHONE (OUTPATIENT)
Facility: CLINIC | Age: 69
End: 2024-05-08

## 2024-05-08 NOTE — TELEPHONE ENCOUNTER
Pt wife called to make appt for eye infection, wants to be called/notified if pt can get an earlier appt than 5/16

## 2024-05-10 NOTE — TELEPHONE ENCOUNTER
Called patient to schedule sooner appointment. Patient states I went to [facility] due to broken blood vessels in eye . SAMI Sanchez has been notified patient has been informed to office if eye gets worse. Patient verbalized understanding.

## 2024-05-22 ENCOUNTER — TELEPHONE (OUTPATIENT)
Facility: CLINIC | Age: 69
End: 2024-05-22

## 2024-05-22 ENCOUNTER — HOSPITAL ENCOUNTER (OUTPATIENT)
Facility: HOSPITAL | Age: 69
Setting detail: SPECIMEN
Discharge: HOME | End: 2024-05-25
Payer: MEDICARE

## 2024-05-22 DIAGNOSIS — I10 ESSENTIAL HYPERTENSION: ICD-10-CM

## 2024-05-22 DIAGNOSIS — E11.65 TYPE 2 DIABETES MELLITUS WITH HYPERGLYCEMIA, WITH LONG-TERM CURRENT USE OF INSULIN (HCC): ICD-10-CM

## 2024-05-22 DIAGNOSIS — E78.2 MIXED HYPERLIPIDEMIA: ICD-10-CM

## 2024-05-22 DIAGNOSIS — Z79.4 TYPE 2 DIABETES MELLITUS WITH HYPERGLYCEMIA, WITH LONG-TERM CURRENT USE OF INSULIN (HCC): ICD-10-CM

## 2024-05-22 LAB
ALBUMIN SERPL-MCNC: 3.9 G/DL (ref 3.4–5)
ALBUMIN/GLOB SERPL: 1.3 (ref 0.8–1.7)
ALP SERPL-CCNC: 69 U/L (ref 45–117)
ALT SERPL-CCNC: 20 U/L (ref 16–61)
ANION GAP SERPL CALC-SCNC: 3 MMOL/L (ref 3–18)
AST SERPL-CCNC: 13 U/L (ref 10–38)
BILIRUB SERPL-MCNC: 0.4 MG/DL (ref 0.2–1)
BUN SERPL-MCNC: 15 MG/DL (ref 7–18)
BUN/CREAT SERPL: 12 (ref 12–20)
CALCIUM SERPL-MCNC: 9.2 MG/DL (ref 8.5–10.1)
CHLORIDE SERPL-SCNC: 107 MMOL/L (ref 100–111)
CHOLEST SERPL-MCNC: 138 MG/DL
CO2 SERPL-SCNC: 33 MMOL/L (ref 21–32)
CREAT SERPL-MCNC: 1.24 MG/DL (ref 0.6–1.3)
CREAT UR-MCNC: 130 MG/DL (ref 30–125)
ERYTHROCYTE [DISTWIDTH] IN BLOOD BY AUTOMATED COUNT: 13.8 % (ref 11.6–14.5)
EST. AVERAGE GLUCOSE BLD GHB EST-MCNC: 206 MG/DL
GLOBULIN SER CALC-MCNC: 3.1 G/DL (ref 2–4)
GLUCOSE SERPL-MCNC: 52 MG/DL (ref 74–99)
HBA1C MFR BLD: 8.8 % (ref 4.2–5.6)
HCT VFR BLD AUTO: 45.9 % (ref 36–48)
HDLC SERPL-MCNC: 65 MG/DL (ref 40–60)
HDLC SERPL: 2.1 (ref 0–5)
HGB BLD-MCNC: 15 G/DL (ref 13–16)
LDLC SERPL CALC-MCNC: 45 MG/DL (ref 0–100)
LIPID PANEL: ABNORMAL
MCH RBC QN AUTO: 31.6 PG (ref 24–34)
MCHC RBC AUTO-ENTMCNC: 32.7 G/DL (ref 31–37)
MCV RBC AUTO: 96.8 FL (ref 78–100)
MICROALBUMIN UR-MCNC: 15.2 MG/DL (ref 0–3)
MICROALBUMIN/CREAT UR-RTO: 117 MG/G (ref 0–30)
NRBC # BLD: 0 K/UL (ref 0–0.01)
NRBC BLD-RTO: 0 PER 100 WBC
PLATELET # BLD AUTO: 223 K/UL (ref 135–420)
PMV BLD AUTO: 10.5 FL (ref 9.2–11.8)
POTASSIUM SERPL-SCNC: 3.7 MMOL/L (ref 3.5–5.5)
PROT SERPL-MCNC: 7 G/DL (ref 6.4–8.2)
RBC # BLD AUTO: 4.74 M/UL (ref 4.35–5.65)
SODIUM SERPL-SCNC: 143 MMOL/L (ref 136–145)
TRIGL SERPL-MCNC: 140 MG/DL
VLDLC SERPL CALC-MCNC: 28 MG/DL
WBC # BLD AUTO: 5.8 K/UL (ref 4.6–13.2)

## 2024-05-22 PROCEDURE — 80061 LIPID PANEL: CPT

## 2024-05-22 PROCEDURE — 36415 COLL VENOUS BLD VENIPUNCTURE: CPT

## 2024-05-22 PROCEDURE — 82043 UR ALBUMIN QUANTITATIVE: CPT

## 2024-05-22 PROCEDURE — 85027 COMPLETE CBC AUTOMATED: CPT

## 2024-05-22 PROCEDURE — 80053 COMPREHEN METABOLIC PANEL: CPT

## 2024-05-22 PROCEDURE — 83036 HEMOGLOBIN GLYCOSYLATED A1C: CPT

## 2024-05-22 PROCEDURE — 82570 ASSAY OF URINE CREATININE: CPT

## 2024-05-23 NOTE — PROGRESS NOTES
Received call from Fide at [facility] lab of patient’s critical glucose result of 52; read back and confirmed.   Called patient who is awake and alert and pt checked blood glucose and it was 182. Pt denies any other hypoglycemic events and was not symptomatic this morning. Patient states started new insulin 1 month ago-will notify pts provider tomorrow.

## 2024-05-23 NOTE — TELEPHONE ENCOUNTER
Received call from Fide at [facility] lab of patient’s critical glucose result of 52; read back and confirmed.  Called patient who is awake and alert and pt checked blood glucose and it was 182. Pt denies any other hypoglycemic events and was not symptomatic this morning. Patient states started new insulin 1 month ago-will notify pts provider tomorrow.

## 2024-05-29 ENCOUNTER — OFFICE VISIT (OUTPATIENT)
Facility: CLINIC | Age: 69
End: 2024-05-29
Payer: MEDICARE

## 2024-05-29 VITALS
DIASTOLIC BLOOD PRESSURE: 86 MMHG | TEMPERATURE: 98.2 F | OXYGEN SATURATION: 92 % | RESPIRATION RATE: 18 BRPM | WEIGHT: 274 LBS | BODY MASS INDEX: 39.22 KG/M2 | HEIGHT: 70 IN | SYSTOLIC BLOOD PRESSURE: 150 MMHG | HEART RATE: 79 BPM

## 2024-05-29 DIAGNOSIS — J43.9 PULMONARY EMPHYSEMA, UNSPECIFIED EMPHYSEMA TYPE (HCC): ICD-10-CM

## 2024-05-29 DIAGNOSIS — Z71.89 ADVANCED CARE PLANNING/COUNSELING DISCUSSION: ICD-10-CM

## 2024-05-29 DIAGNOSIS — Z00.00 MEDICARE ANNUAL WELLNESS VISIT, SUBSEQUENT: Primary | ICD-10-CM

## 2024-05-29 DIAGNOSIS — I71.40 ABDOMINAL AORTIC ANEURYSM (AAA) WITHOUT RUPTURE, UNSPECIFIED PART (HCC): ICD-10-CM

## 2024-05-29 DIAGNOSIS — I48.0 PAROXYSMAL ATRIAL FIBRILLATION (HCC): ICD-10-CM

## 2024-05-29 DIAGNOSIS — I10 ESSENTIAL HYPERTENSION: ICD-10-CM

## 2024-05-29 DIAGNOSIS — I50.22 CHRONIC SYSTOLIC CHF (CONGESTIVE HEART FAILURE), NYHA CLASS 2 (HCC): ICD-10-CM

## 2024-05-29 DIAGNOSIS — M25.511 CHRONIC RIGHT SHOULDER PAIN: ICD-10-CM

## 2024-05-29 DIAGNOSIS — E11.65 TYPE 2 DIABETES MELLITUS WITH HYPERGLYCEMIA, WITH LONG-TERM CURRENT USE OF INSULIN (HCC): ICD-10-CM

## 2024-05-29 DIAGNOSIS — E78.2 MIXED HYPERLIPIDEMIA: ICD-10-CM

## 2024-05-29 DIAGNOSIS — G89.29 CHRONIC RIGHT SHOULDER PAIN: ICD-10-CM

## 2024-05-29 DIAGNOSIS — R05.3 CHRONIC COUGH: ICD-10-CM

## 2024-05-29 DIAGNOSIS — Z79.4 TYPE 2 DIABETES MELLITUS WITH HYPERGLYCEMIA, WITH LONG-TERM CURRENT USE OF INSULIN (HCC): ICD-10-CM

## 2024-05-29 PROCEDURE — 3079F DIAST BP 80-89 MM HG: CPT | Performed by: NURSE PRACTITIONER

## 2024-05-29 PROCEDURE — 1123F ACP DISCUSS/DSCN MKR DOCD: CPT | Performed by: NURSE PRACTITIONER

## 2024-05-29 PROCEDURE — G0439 PPPS, SUBSEQ VISIT: HCPCS | Performed by: NURSE PRACTITIONER

## 2024-05-29 PROCEDURE — 3052F HG A1C>EQUAL 8.0%<EQUAL 9.0%: CPT | Performed by: NURSE PRACTITIONER

## 2024-05-29 PROCEDURE — 3077F SYST BP >= 140 MM HG: CPT | Performed by: NURSE PRACTITIONER

## 2024-05-29 PROCEDURE — 3017F COLORECTAL CA SCREEN DOC REV: CPT | Performed by: NURSE PRACTITIONER

## 2024-05-29 RX ORDER — AMLODIPINE BESYLATE 5 MG/1
5 TABLET ORAL DAILY
Qty: 30 TABLET | Refills: 3 | Status: SHIPPED | OUTPATIENT
Start: 2024-05-29

## 2024-05-29 ASSESSMENT — PATIENT HEALTH QUESTIONNAIRE - PHQ9
2. FEELING DOWN, DEPRESSED OR HOPELESS: NOT AT ALL
SUM OF ALL RESPONSES TO PHQ9 QUESTIONS 1 & 2: 0
SUM OF ALL RESPONSES TO PHQ QUESTIONS 1-9: 0
1. LITTLE INTEREST OR PLEASURE IN DOING THINGS: NOT AT ALL
SUM OF ALL RESPONSES TO PHQ QUESTIONS 1-9: 0

## 2024-05-29 NOTE — PATIENT INSTRUCTIONS
2 drinks a day for men and 1 drink a day for women. Too much alcohol can cause health problems.     Manage other health problems such as diabetes, high blood pressure, and high cholesterol. If you think you may have a problem with alcohol or drug use, talk to your doctor.   Medicines    Take your medicines exactly as prescribed. Call your doctor if you think you are having a problem with your medicine.     If your doctor recommends aspirin, take the amount directed each day. Make sure you take aspirin and not another kind of pain reliever, such as acetaminophen (Tylenol).   When should you call for help?   Call 911 if you have symptoms of a heart attack. These may include:    Chest pain or pressure, or a strange feeling in the chest.     Sweating.     Shortness of breath.     Pain, pressure, or a strange feeling in the back, neck, jaw, or upper belly or in one or both shoulders or arms.     Lightheadedness or sudden weakness.     A fast or irregular heartbeat.   After you call 911, the  may tell you to chew 1 adult-strength or 2 to 4 low-dose aspirin. Wait for an ambulance. Do not try to drive yourself.  Watch closely for changes in your health, and be sure to contact your doctor if you have any problems.  Where can you learn more?  Go to https://www.BroadClip.net/patientEd and enter F075 to learn more about \"A Healthy Heart: Care Instructions.\"  Current as of: June 24, 2023               Content Version: 14.0  © 2903-8775 [employer].   Care instructions adapted under license by [employer]. If you have questions about a medical condition or this instruction, always ask your healthcare professional. [employer] disclaims any warranty or liability for your use of this information.      Personalized Preventive Plan for Maria Elena Willoughby - 5/29/2024  Medicare offers a range of preventive health benefits. Some of the tests and screenings are paid in full while other may be subject to a

## 2024-05-29 NOTE — PROGRESS NOTES
Medicare Annual Wellness Visit    Maria Elena Willoughby is here for No chief complaint on file.    Assessment & Plan   Medicare annual wellness visit, subsequent  Advanced care planning/counseling discussion  Type 2 diabetes mellitus with hyperglycemia, with long-term current use of insulin (HCC)  -     Hemoglobin A1C; Future  -     Microalbumin / Creatinine Urine Ratio; Future  Essential hypertension  -     amLODIPine (NORVASC) 5 MG tablet; Take 1 tablet by mouth daily, Disp-30 tablet, R-3Normal  -     Comprehensive Metabolic Panel; Future  -     CBC; Future  Mixed hyperlipidemia  -     Lipid Panel; Future  Chronic cough  Chronic right shoulder pain  Chronic systolic CHF (congestive heart failure), NYHA class 2 ([facility])  Assessment & Plan:   Monitored by specialist- no acute findings meriting change in the plan  Pulmonary emphysema, unspecified emphysema type ([facility])  Assessment & Plan:   Monitored by specialist- no acute findings meriting change in the plan  Paroxysmal atrial fibrillation ([facility])  Assessment & Plan:   Monitored by specialist- no acute findings meriting change in the plan  Abdominal aortic aneurysm (AAA) without rupture, unspecified part ([facility])  Assessment & Plan:   Well-controlled, continue current treatment plan  MAWV completed today, HC decision maker verified, ACP discussion completed  DM uncontrolled, A1C 8.8%, managed by endocrinology  Blood pressure uncontrolled, start amlodipine  HLD controlled, LDL  Endorses chronic cough and right shoulder that started around 5/2023 he also had a fall around that time. He is currently seeing NP Norton with neurology who has order PT for speech and a MRI of the head which he has not had completed at this time, he has been referred to neuropsychology   Labs prior to next visit  Refills provided if needed  Patient verbalized understanding and is in agreement with this plan of care       Recommendations for Preventive Services Due: see orders and patient

## 2024-05-29 NOTE — ACP (ADVANCE CARE PLANNING)
Advance Care Planning     General Advance Care Planning (ACP) Conversation    Date of Conversation: 5/29/2024  Conducted with: Patient with Decision Making Capacity  Other persons present: None    Healthcare Decision Maker:    Primary Decision Maker: Lia Willoughby - Spouse - [phone]  Click here to complete Healthcare Decision Makers including selection of the Healthcare Decision Maker Relationship (ie \"Primary\").  Today we documented Decision Maker(s) consistent with Legal Next of Kin hierarchy.    Content/Action Overview:  Has NO ACP documents-Information provided  Reviewed DNR/DNI and patient elects Full Code (Attempt Resuscitation)  resuscitation preferences      Length of Voluntary ACP Conversation in minutes:  <16 minutes (Non-Billable)    Isabel Sanchez, APRN - CNP

## 2024-05-29 NOTE — PROGRESS NOTES
Room 9    Maria Elena Willoughby had no chief complaint listed for this encounter. for today's visit .     When asked if patient has any concerns she/he would like to address with SAMI Sanchez  patient states I have a cough that is on going for the past 2-3 weeks and I am having right shoulder pain . SAMI Sanchez has been notified  of patient concerns .    Did patient bring someone? No    Did the patient have DME equipment? Yes cane    Did you take your medication today? Patient states yes       1. \"Have you been to the ER, urgent care clinic since your last visit?  Hospitalized since your last visit?\" .NO    2. \"Have you seen or consulted any other health care providers outside of the [facility] since your last visit?\" No     3. For patients aged 45-75: Has the patient had a colonoscopy / FIT/ Cologuard? Yes      If the patient is female:    4. For patients aged 40-74: Has the patient had a mammogram within the past 2 years? N/A      5. For patients aged 21-65: Has the patient had a pap smear? {Cancer Care Gap present? N/A        5/29/2024     2:29 PM   Amb Fall Risk Assessment and TUG Test   Do you feel unsteady or are you worried about falling?  no   2 or more falls in past year? no   Fall with injury in past year? no              5/29/2024     2:29 PM   PHQ-9    Little interest or pleasure in doing things 0   Feeling down, depressed, or hopeless 0   PHQ-2 Score 0   PHQ-9 Total Score 0          Health Maintenance Due   Topic Date Due    Shingles vaccine (1 of 2) Never done    Respiratory Syncytial Virus (RSV) Pregnant or age 60 yrs+ (1 - 1-dose 60+ series) Never done    Pneumococcal 65+ years Vaccine (2 of 2 - PCV) 02/03/2015    Diabetic foot exam  10/11/2021    Diabetic retinal exam  02/02/2022    COVID-19 Vaccine (5 - 2023-24 season) 09/01/2023

## 2024-06-10 RX ORDER — SACUBITRIL AND VALSARTAN 49; 51 MG/1; MG/1
TABLET, FILM COATED ORAL
Qty: 60 TABLET | OUTPATIENT
Start: 2024-06-10

## 2024-06-10 NOTE — TELEPHONE ENCOUNTER
Pt called in to request refill on medication: sacubitril-valsartan (ENTRESTO) 49-51 MG per tablet sent to [facility].

## 2024-06-17 NOTE — TELEPHONE ENCOUNTER
Called to inform pt that per SAMI Sanchez instructions states to please have pt's cardiologist needs to fill the medication Entresto . Pt verbalized understanding.

## 2024-07-09 PROBLEM — E55.9 VITAMIN D DEFICIENCY: Status: ACTIVE | Noted: 2022-10-31

## 2024-07-09 PROBLEM — I51.9 HEART DISEASE: Status: ACTIVE | Noted: 2022-10-31

## 2024-07-09 PROBLEM — E11.9 DIABETES MELLITUS (HCC): Status: ACTIVE | Noted: 2022-10-31

## 2024-07-09 PROBLEM — G47.33 OBSTRUCTIVE SLEEP APNEA SYNDROME: Status: ACTIVE | Noted: 2022-10-31

## 2024-07-09 PROBLEM — J45.909 ASTHMA: Status: ACTIVE | Noted: 2022-10-31

## 2024-07-22 LAB — HBA1C MFR BLD HPLC: 7.2 %

## 2024-08-07 ENCOUNTER — TELEPHONE (OUTPATIENT)
Facility: CLINIC | Age: 69
End: 2024-08-07

## 2024-08-07 NOTE — TELEPHONE ENCOUNTER
----- Message from Jesse Dc Gleason Peters sent at 7/31/2024  2:38 PM EDT -----  Regarding: ECC Escalation To Practice  ECC Escalation To Practice      Type of Escalation: Acute Care Symptom  --------------------------------------------------------------------------------------------------------------------------    Information for Provider:  Patient is looking for appointment for: Symptom Cough for almost 6 months  Reasons for Message: Unable to reach practice     Additional Information Cough for almost 6 months  --------------------------------------------------------------------------------------------------------------------------    Relationship to Patient: Self     Call Back Info: OK to leave message on voicemail  Preferred Call Back Number: Phone [phone]

## 2024-09-10 ENCOUNTER — OFFICE VISIT (OUTPATIENT)
Facility: CLINIC | Age: 69
End: 2024-09-10
Payer: MEDICARE

## 2024-09-10 VITALS
TEMPERATURE: 98.3 F | WEIGHT: 260 LBS | BODY MASS INDEX: 37.22 KG/M2 | HEIGHT: 70 IN | SYSTOLIC BLOOD PRESSURE: 100 MMHG | RESPIRATION RATE: 18 BRPM | DIASTOLIC BLOOD PRESSURE: 69 MMHG | OXYGEN SATURATION: 93 % | HEART RATE: 95 BPM

## 2024-09-10 DIAGNOSIS — E16.2 HYPOGLYCEMIA: Primary | ICD-10-CM

## 2024-09-10 DIAGNOSIS — I42.9 CARDIOMYOPATHY, UNSPECIFIED TYPE (HCC): ICD-10-CM

## 2024-09-10 DIAGNOSIS — R13.19 OTHER DYSPHAGIA: ICD-10-CM

## 2024-09-10 DIAGNOSIS — Z09 HOSPITAL DISCHARGE FOLLOW-UP: ICD-10-CM

## 2024-09-10 DIAGNOSIS — I10 ESSENTIAL HYPERTENSION: ICD-10-CM

## 2024-09-10 DIAGNOSIS — E11.65 TYPE 2 DIABETES MELLITUS WITH HYPERGLYCEMIA, WITH LONG-TERM CURRENT USE OF INSULIN (HCC): ICD-10-CM

## 2024-09-10 DIAGNOSIS — Z79.4 TYPE 2 DIABETES MELLITUS WITH HYPERGLYCEMIA, WITH LONG-TERM CURRENT USE OF INSULIN (HCC): ICD-10-CM

## 2024-09-10 DIAGNOSIS — E87.6 HYPOKALEMIA: ICD-10-CM

## 2024-09-10 PROBLEM — E11.9 DIABETES MELLITUS (HCC): Status: RESOLVED | Noted: 2022-10-31 | Resolved: 2024-09-10

## 2024-09-10 PROBLEM — I51.9 HEART DISEASE: Status: RESOLVED | Noted: 2022-10-31 | Resolved: 2024-09-10

## 2024-09-10 PROCEDURE — 99214 OFFICE O/P EST MOD 30 MIN: CPT | Performed by: NURSE PRACTITIONER

## 2024-09-10 PROCEDURE — 1036F TOBACCO NON-USER: CPT | Performed by: NURSE PRACTITIONER

## 2024-09-10 PROCEDURE — 3074F SYST BP LT 130 MM HG: CPT | Performed by: NURSE PRACTITIONER

## 2024-09-10 PROCEDURE — 2022F DILAT RTA XM EVC RTNOPTHY: CPT | Performed by: NURSE PRACTITIONER

## 2024-09-10 PROCEDURE — G8427 DOCREV CUR MEDS BY ELIG CLIN: HCPCS | Performed by: NURSE PRACTITIONER

## 2024-09-10 PROCEDURE — 1123F ACP DISCUSS/DSCN MKR DOCD: CPT | Performed by: NURSE PRACTITIONER

## 2024-09-10 PROCEDURE — 3017F COLORECTAL CA SCREEN DOC REV: CPT | Performed by: NURSE PRACTITIONER

## 2024-09-10 PROCEDURE — 3052F HG A1C>EQUAL 8.0%<EQUAL 9.0%: CPT | Performed by: NURSE PRACTITIONER

## 2024-09-10 PROCEDURE — 3078F DIAST BP <80 MM HG: CPT | Performed by: NURSE PRACTITIONER

## 2024-09-10 PROCEDURE — G8417 CALC BMI ABV UP PARAM F/U: HCPCS | Performed by: NURSE PRACTITIONER

## 2024-09-10 RX ORDER — INSULIN GLARGINE 100 [IU]/ML
40 INJECTION, SOLUTION SUBCUTANEOUS NIGHTLY
Qty: 5 ADJUSTABLE DOSE PRE-FILLED PEN SYRINGE | Refills: 1 | Status: SHIPPED
Start: 2024-09-10

## 2024-09-10 ASSESSMENT — PATIENT HEALTH QUESTIONNAIRE - PHQ9
SUM OF ALL RESPONSES TO PHQ9 QUESTIONS 1 & 2: 0
1. LITTLE INTEREST OR PLEASURE IN DOING THINGS: NOT AT ALL
SUM OF ALL RESPONSES TO PHQ QUESTIONS 1-9: 0
2. FEELING DOWN, DEPRESSED OR HOPELESS: NOT AT ALL

## 2024-09-30 ENCOUNTER — OFFICE VISIT (OUTPATIENT)
Facility: CLINIC | Age: 69
End: 2024-09-30
Payer: MEDICARE

## 2024-09-30 VITALS
HEIGHT: 70 IN | HEART RATE: 80 BPM | DIASTOLIC BLOOD PRESSURE: 79 MMHG | BODY MASS INDEX: 37.8 KG/M2 | OXYGEN SATURATION: 93 % | SYSTOLIC BLOOD PRESSURE: 124 MMHG | RESPIRATION RATE: 18 BRPM | WEIGHT: 264 LBS | TEMPERATURE: 98.1 F

## 2024-09-30 DIAGNOSIS — Z23 ENCOUNTER FOR IMMUNIZATION: ICD-10-CM

## 2024-09-30 DIAGNOSIS — Z79.4 TYPE 2 DIABETES MELLITUS WITH HYPERGLYCEMIA, WITH LONG-TERM CURRENT USE OF INSULIN (HCC): Primary | ICD-10-CM

## 2024-09-30 DIAGNOSIS — E78.2 MIXED HYPERLIPIDEMIA: ICD-10-CM

## 2024-09-30 DIAGNOSIS — I10 ESSENTIAL HYPERTENSION: ICD-10-CM

## 2024-09-30 DIAGNOSIS — E11.65 TYPE 2 DIABETES MELLITUS WITH HYPERGLYCEMIA, WITH LONG-TERM CURRENT USE OF INSULIN (HCC): Primary | ICD-10-CM

## 2024-09-30 PROCEDURE — 1123F ACP DISCUSS/DSCN MKR DOCD: CPT | Performed by: NURSE PRACTITIONER

## 2024-09-30 PROCEDURE — 2022F DILAT RTA XM EVC RTNOPTHY: CPT | Performed by: NURSE PRACTITIONER

## 2024-09-30 PROCEDURE — G0008 ADMIN INFLUENZA VIRUS VAC: HCPCS | Performed by: NURSE PRACTITIONER

## 2024-09-30 PROCEDURE — G8427 DOCREV CUR MEDS BY ELIG CLIN: HCPCS | Performed by: NURSE PRACTITIONER

## 2024-09-30 PROCEDURE — 1036F TOBACCO NON-USER: CPT | Performed by: NURSE PRACTITIONER

## 2024-09-30 PROCEDURE — 3078F DIAST BP <80 MM HG: CPT | Performed by: NURSE PRACTITIONER

## 2024-09-30 PROCEDURE — 99214 OFFICE O/P EST MOD 30 MIN: CPT | Performed by: NURSE PRACTITIONER

## 2024-09-30 PROCEDURE — G8417 CALC BMI ABV UP PARAM F/U: HCPCS | Performed by: NURSE PRACTITIONER

## 2024-09-30 PROCEDURE — 90653 IIV ADJUVANT VACCINE IM: CPT | Performed by: NURSE PRACTITIONER

## 2024-09-30 PROCEDURE — 3074F SYST BP LT 130 MM HG: CPT | Performed by: NURSE PRACTITIONER

## 2024-09-30 PROCEDURE — 3052F HG A1C>EQUAL 8.0%<EQUAL 9.0%: CPT | Performed by: NURSE PRACTITIONER

## 2024-09-30 PROCEDURE — 3017F COLORECTAL CA SCREEN DOC REV: CPT | Performed by: NURSE PRACTITIONER

## 2024-09-30 RX ORDER — SEMAGLUTIDE 1.34 MG/ML
1 INJECTION, SOLUTION SUBCUTANEOUS
Qty: 9 ML | Refills: 0
Start: 2024-09-30

## 2024-09-30 ASSESSMENT — PATIENT HEALTH QUESTIONNAIRE - PHQ9
1. LITTLE INTEREST OR PLEASURE IN DOING THINGS: NOT AT ALL
SUM OF ALL RESPONSES TO PHQ QUESTIONS 1-9: 0
2. FEELING DOWN, DEPRESSED OR HOPELESS: NOT AT ALL
SUM OF ALL RESPONSES TO PHQ9 QUESTIONS 1 & 2: 0

## 2024-09-30 NOTE — PROGRESS NOTES
[address] [place] [address]               [phone]      Maria Elena Willoughby is a 69 y.o. male and presents with Follow-up Chronic Condition, Hypertension, Diabetes, and Cholesterol Problem       Assessment/Plan:    1. Type 2 diabetes mellitus with hyperglycemia, with long-term current use of insulin (HCC)  -     Semaglutide, 1 MG/DOSE, (OZEMPIC, 1 MG/DOSE,) 4 MG/3ML SOPN sc injection; Inject 1 mg into the skin every 7 days, Disp-9 mL, R-0NO PRINT  -     Hemoglobin A1C; Future  -     Microalbumin / Creatinine Urine Ratio; Future  2. Essential hypertension  -     Comprehensive Metabolic Panel; Future  -     CBC; Future  3. Mixed hyperlipidemia  -     Lipid Panel; Future  4. Encounter for immunization  -     THER/PROPH/DIAG INJECTION, SUBCUT/IM  -     Influenza, FLUAD Trivalent, (age 65 y+), IM, Preservative Free, 0.5mL   DM controlled, A1C 7.0%  Blood pressure controlled, continue amlodipine, metoprolol, spironolactone and bumex at same dose  Lipids controlled, last LDL was 45  Health maintenance addressed        Follow up and disposition:   Return in about 4 months (around 1/30/2025) for DM, DMfoot, HTN, HLD, 30min, office, w/labsprior.      Subjective:    Labs obtained prior to visit? No  Reviewed with patient? N/A    DMII- A1C 7.0   Patient reports medication compliance Yes  Diabetic diet compliance frequently  Patient monitors blood sugars regularly  CGM   Home glucose readings: 252, 254, 69 average in the 130's   Denies hypoglycemic episodes Yes  Denies polyuria, polydipsia, paraesthesia, vision changes? Yes  Engaging in daily exercise? Routine     Diabetes Management   Topic Date Due    Diabetic foot exam  10/11/2021    Diabetic retinal exam  02/02/2022     Lab Results   Component Value Date/Time    LABA1C 8.8 05/22/2024 01:05 PM   ]  Lab Results   Component Value Date/Time    MALBCR 117 05/22/2024 01:05 PM   ]  Diabetic medications:   Diabetic Medications       Insulin

## 2024-09-30 NOTE — PROGRESS NOTES
Room 7    Patient presents to office for Fluad injection. Injection ordered per smart set and pended  for Jean Carrera CMA to sign. After obtaining signed consent from patient area was prepped and injection given IM. No signs nor symptoms of adverse reaction noted. Patient tolerated injection well.    Maria Elena Willoughby had concerns including Follow-up Chronic Condition, Hypertension, Diabetes, and Cholesterol Problem. for today's visit .     When asked if patient has any concerns she/he would like to address with SAMI Sanchez . SAMI Sanchez has been notified  of patient concerns .        1. \"Have you been to the ER, urgent care clinic since your last visit?  Hospitalized since your last visit?\" .NO    2. \"Have you seen or consulted any other health care providers outside of the [facility] since your last visit?\" No    3. For patients aged 45-75: Has the patient had a colonoscopy / FIT/ Cologuard? Yes      If the patient is female:    4. For patients aged 40-74: Has the patient had a mammogram within the past 2 years? N/A      5. For patients aged 21-65: Has the patient had a pap smear? {Cancer Care Gap present? N/A            9/30/2024     1:29 PM   Amb Fall Risk Assessment and TUG Test   Do you feel unsteady or are you worried about falling?  no   2 or more falls in past year? no   Fall with injury in past year? no              9/30/2024     1:29 PM   PHQ-9    Little interest or pleasure in doing things 0   Feeling down, depressed, or hopeless 0   PHQ-2 Score 0   PHQ-9 Total Score 0          Health Maintenance Due   Topic Date Due    Shingles vaccine (1 of 2) Never done    Respiratory Syncytial Virus (RSV) Pregnant or age 60 yrs+ (1 - 1-dose 60+ series) Never done    Pneumococcal 65+ years Vaccine (2 of 2 - PCV) 02/03/2015    Diabetic foot exam  10/11/2021    Diabetic retinal exam  02/02/2022    Flu vaccine (1) 08/01/2024    COVID-19 Vaccine (6 - 2023-24 season) 09/01/2024

## 2024-11-25 ENCOUNTER — OFFICE VISIT (OUTPATIENT)
Facility: CLINIC | Age: 69
End: 2024-11-25

## 2024-11-25 ENCOUNTER — TELEPHONE (OUTPATIENT)
Facility: CLINIC | Age: 69
End: 2024-11-25

## 2024-11-25 VITALS
BODY MASS INDEX: 36.31 KG/M2 | DIASTOLIC BLOOD PRESSURE: 69 MMHG | RESPIRATION RATE: 18 BRPM | SYSTOLIC BLOOD PRESSURE: 103 MMHG | WEIGHT: 253.6 LBS | HEART RATE: 87 BPM | TEMPERATURE: 98 F | OXYGEN SATURATION: 93 % | HEIGHT: 70 IN

## 2024-11-25 DIAGNOSIS — E53.8 B12 DEFICIENCY: ICD-10-CM

## 2024-11-25 DIAGNOSIS — N28.9 RENAL LESION: ICD-10-CM

## 2024-11-25 DIAGNOSIS — Z09 HOSPITAL DISCHARGE FOLLOW-UP: ICD-10-CM

## 2024-11-25 DIAGNOSIS — M48.061 SPINAL STENOSIS OF LUMBAR REGION, UNSPECIFIED WHETHER NEUROGENIC CLAUDICATION PRESENT: ICD-10-CM

## 2024-11-25 DIAGNOSIS — E55.9 VITAMIN D DEFICIENCY: ICD-10-CM

## 2024-11-25 DIAGNOSIS — Z79.4 TYPE 2 DIABETES MELLITUS WITHOUT COMPLICATION, WITH LONG-TERM CURRENT USE OF INSULIN (HCC): ICD-10-CM

## 2024-11-25 DIAGNOSIS — I10 ESSENTIAL HYPERTENSION: ICD-10-CM

## 2024-11-25 DIAGNOSIS — E11.9 TYPE 2 DIABETES MELLITUS WITHOUT COMPLICATION, WITH LONG-TERM CURRENT USE OF INSULIN (HCC): ICD-10-CM

## 2024-11-25 DIAGNOSIS — E16.2 HYPOGLYCEMIA: ICD-10-CM

## 2024-11-25 DIAGNOSIS — J43.9 PULMONARY EMPHYSEMA, UNSPECIFIED EMPHYSEMA TYPE (HCC): ICD-10-CM

## 2024-11-25 DIAGNOSIS — R40.4 TRANSIENT ALTERATION OF AWARENESS: Primary | ICD-10-CM

## 2024-11-25 PROBLEM — E66.01 CLASS 2 SEVERE OBESITY DUE TO EXCESS CALORIES WITH SERIOUS COMORBIDITY AND BODY MASS INDEX (BMI) OF 36.0 TO 36.9 IN ADULT: Status: ACTIVE | Noted: 2024-11-25

## 2024-11-25 PROBLEM — Z85.46 HISTORY OF PROSTATE CANCER: Status: ACTIVE | Noted: 2018-02-08

## 2024-11-25 PROBLEM — E66.812 CLASS 2 SEVERE OBESITY DUE TO EXCESS CALORIES WITH SERIOUS COMORBIDITY AND BODY MASS INDEX (BMI) OF 36.0 TO 36.9 IN ADULT: Status: ACTIVE | Noted: 2024-11-25

## 2024-11-25 RX ORDER — POLYETHYLENE GLYCOL 3350 17 G/17G
17 POWDER, FOR SOLUTION ORAL DAILY
COMMUNITY

## 2024-11-25 RX ORDER — ROSUVASTATIN CALCIUM 20 MG/1
20 TABLET, COATED ORAL DAILY
COMMUNITY

## 2024-11-25 RX ORDER — FLUTICASONE PROPIONATE 50 MCG
1 SPRAY, SUSPENSION (ML) NASAL DAILY PRN
Qty: 16 G | Refills: 3 | Status: SHIPPED | OUTPATIENT
Start: 2024-11-25

## 2024-11-25 RX ORDER — OMEGA-3S/DHA/EPA/FISH OIL/D3 300MG-1000
400 CAPSULE ORAL DAILY
Qty: 90 TABLET | Refills: 1 | Status: SHIPPED | OUTPATIENT
Start: 2024-11-25

## 2024-11-25 RX ORDER — OMEGA-3S/DHA/EPA/FISH OIL/D3 300MG-1000
400 CAPSULE ORAL DAILY
COMMUNITY
End: 2024-11-25 | Stop reason: SDUPTHER

## 2024-11-25 RX ORDER — FLUTICASONE FUROATE AND VILANTEROL 100; 25 UG/1; UG/1
1 POWDER RESPIRATORY (INHALATION) DAILY
Qty: 1 EACH | Refills: 3 | Status: SHIPPED | OUTPATIENT
Start: 2024-11-25

## 2024-11-25 RX ORDER — FLUTICASONE PROPIONATE 50 MCG
1 SPRAY, SUSPENSION (ML) NASAL DAILY PRN
COMMUNITY
End: 2024-11-25 | Stop reason: SDUPTHER

## 2024-11-25 RX ORDER — FLUTICASONE FUROATE AND VILANTEROL 100; 25 UG/1; UG/1
1 POWDER RESPIRATORY (INHALATION) DAILY
COMMUNITY
End: 2024-11-25 | Stop reason: SDUPTHER

## 2024-11-25 RX ORDER — LANOLIN ALCOHOL/MO/W.PET/CERES
CREAM (GRAM) TOPICAL
COMMUNITY
Start: 2024-11-20 | End: 2024-11-25 | Stop reason: SDUPTHER

## 2024-11-25 RX ORDER — LANOLIN ALCOHOL/MO/W.PET/CERES
1000 CREAM (GRAM) TOPICAL DAILY
Qty: 90 TABLET | Refills: 1 | Status: SHIPPED | OUTPATIENT
Start: 2024-11-25

## 2024-11-25 ASSESSMENT — PATIENT HEALTH QUESTIONNAIRE - PHQ9
1. LITTLE INTEREST OR PLEASURE IN DOING THINGS: NOT AT ALL
SUM OF ALL RESPONSES TO PHQ QUESTIONS 1-9: 0
2. FEELING DOWN, DEPRESSED OR HOPELESS: NOT AT ALL
SUM OF ALL RESPONSES TO PHQ9 QUESTIONS 1 & 2: 0
SUM OF ALL RESPONSES TO PHQ QUESTIONS 1-9: 0

## 2024-11-25 NOTE — PROGRESS NOTES
[address] [place] [address]               [phone]      Maria Elena Willoughby is a 69 y.o. male and presents with Follow-Up from Hospital       Assessment/Plan:    1. Transient alteration of awareness  2. Renal lesion  3. Hypoglycemia  4. Hospital discharge follow-up  5. Spinal stenosis of lumbar region, unspecified whether neurogenic claudication present  6. Essential hypertension  -     Comprehensive Metabolic Panel; Future  7. Type 2 diabetes mellitus without complication, with long-term current use of insulin ([facility])  -     Lipid Panel; Future  -     Hemoglobin A1C; Future  -     Microalbumin / Creatinine Urine Ratio; Future  8. Pulmonary emphysema, unspecified emphysema type ([facility])  -     fluticasone furoate-vilanterol (BREO ELLIPTA) 100-25 MCG/ACT inhaler; Inhale 1 puff into the lungs daily, Disp-1 each, R-3Normal  -     CBC; Future  9. Vitamin D deficiency  -     cholecalciferol (VITAMIN D3) 400 UNIT TABS tablet; Take 1 tablet by mouth daily, Disp-90 tablet, R-1Normal  -     Vitamin D 25 Hydroxy; Future  10. B12 deficiency  -     vitamin B-12 (CYANOCOBALAMIN) 1000 MCG tablet; Take 1 tablet by mouth daily, Disp-90 tablet, R-1Normal  -     Vitamin B12; Future   Admitted to [facility] for 4 days for altered mental status  Was found to have hypoglycemia and hypotension  It was felt his hypoglycemia is the cause of his mental status changes  Medication adjusted: only on entresto and metformin at this time  Advised to follow up with his endocrinologist and cardiologist for further evaluation and management as needed  Had a CT scan of his lumbar spine completed showing sever spinal stenosis: this is not a new problem for him  Incidental finding on the CT was a left kidney lesion, likely to be a cyst w/ recommendation of follow up MRI as outpatient-Order for MRI placed, although he has a pacer, patient states he has had a MRI safely with the pacer in the past  There is also a recommendation

## 2024-11-25 NOTE — PROGRESS NOTES
Room 7    When asked if patient has seen the (referral) patient states . Patient referral order has been re-printed and address has been highlighted for patient.    Maria Elena Willoughby had concerns including Follow-Up from Hospital. for today's visit .     When asked if patient has any concerns she/he would like to address with SAMI Sanchez . SAMI Sanchez has been notified  of patient concerns .    Did patient bring someone? Yes, wife    Did the patient have DME equipment?     Did you take your medication today?       1. \"Have you been to the ER, urgent care clinic since your last visit?  Hospitalized since your last visit?\" Patient states I went to [facility] due to cough .    2. \"Have you seen or consulted any other health care providers outside of the [facility] since your last visit?\" No     3. For patients aged 45-75: Has the patient had a colonoscopy / FIT/ Cologuard? Yes      If the patient is female:    4. For patients aged 40-74: Has the patient had a mammogram within the past 2 years? N/A      5. For patients aged 21-65: Has the patient had a pap smear? {Cancer Care Gap present? N/A            11/25/2024    11:55 AM   Amb Fall Risk Assessment and TUG Test   Do you feel unsteady or are you worried about falling?  yes   2 or more falls in past year? yes   Fall with injury in past year? no              11/25/2024    11:55 AM   PHQ-9    Little interest or pleasure in doing things 0   Feeling down, depressed, or hopeless 0   PHQ-2 Score 0   PHQ-9 Total Score 0          Health Maintenance Due   Topic Date Due    Shingles vaccine (1 of 2) Never done    Respiratory Syncytial Virus (RSV) Pregnant or age 60 yrs+ (1 - Risk 60-74 years 1-dose series) Never done    Pneumococcal 65+ years Vaccine (2 of 2 - PCV) 02/03/2015    Diabetic foot exam  10/11/2021    Diabetic retinal exam  02/02/2022    COVID-19 Vaccine (6 - 2023-24 season) 09/01/2024

## 2024-11-29 ENCOUNTER — TELEPHONE (OUTPATIENT)
Facility: CLINIC | Age: 69
End: 2024-11-29

## 2024-11-29 DIAGNOSIS — E11.9 TYPE 2 DIABETES MELLITUS WITHOUT COMPLICATION, WITH LONG-TERM CURRENT USE OF INSULIN (HCC): ICD-10-CM

## 2024-11-29 DIAGNOSIS — N28.9 KIDNEY LESION, NATIVE, LEFT: Primary | ICD-10-CM

## 2024-11-29 DIAGNOSIS — Z79.4 TYPE 2 DIABETES MELLITUS WITHOUT COMPLICATION, WITH LONG-TERM CURRENT USE OF INSULIN (HCC): ICD-10-CM

## 2024-11-29 RX ORDER — BLOOD-GLUCOSE METER
KIT MISCELLANEOUS
Qty: 1 KIT | Refills: 0 | Status: SHIPPED | OUTPATIENT
Start: 2024-11-29

## 2024-11-29 RX ORDER — BLOOD-GLUCOSE METER
1 KIT MISCELLANEOUS DAILY
Qty: 100 EACH | Refills: 3 | Status: SHIPPED | OUTPATIENT
Start: 2024-11-29

## 2024-11-29 RX ORDER — LANCETS 28 GAUGE
1 EACH MISCELLANEOUS DAILY
Qty: 100 EACH | Refills: 3 | Status: SHIPPED | OUTPATIENT
Start: 2024-11-29

## 2024-11-29 NOTE — TELEPHONE ENCOUNTER
Called patient in regards of SAMI Sanchez instructions to call patient to schedule follow up appointment for DM, DMfoot, HTN, HLD, 30min, office, w/labsprior. Patient wife answered put was on the phone with SAMI Sanchez. Patient wife has advised that Jean Carrera CMA will call back to schedule a follow up appointment. Patient wife verbalized understanding .

## 2024-11-29 NOTE — TELEPHONE ENCOUNTER
Called patient, clarified what device he needs to check his glucose at home with the lorelei is not reading correctly. States he does not have an alternate machine, order for new machine and supplies placed.   States he has been in a MRI safely since having his pacer placed, order for MRI placed to follow up on kidney lesion seen on CT scan performed for back pain at [facility].     Patient verbalized understanding and is in agreement with this plan of care

## 2024-12-20 NOTE — TELEPHONE ENCOUNTER
Requested Prescriptions     Pending Prescriptions Disp Refills    tiotropium (SPIRIVA) 18 mcg inhalation capsule 30 Cap 2     Sig: Take 1 Cap by inhalation daily.  empagliflozin (Jardiance) 25 mg tablet 90 Tab 3     Sig: Take 1 Tab by mouth daily.
Tried calling pt- Per NP Samina Flanagan- we need to know where to send prescriptions too- pt is not calling me back so unsure where he wants prescriptions sent- Left vm for him to call with correct pharmacy.  Thanks
No

## 2025-01-07 ENCOUNTER — TELEPHONE (OUTPATIENT)
Facility: CLINIC | Age: 70
End: 2025-01-07

## 2025-01-07 NOTE — TELEPHONE ENCOUNTER
Mr. Willoughby called and requested refills on the following medications:   - allopurinol (ZYLOPRIM) 100 MG tablet  - rivaroxaban (XARELTO) 20 MG TABS tablet  - tamsulosin (FLOMAX) 0.4 MG capsule    Thank you

## 2025-01-13 DIAGNOSIS — C61 PROSTATE CANCER (HCC): ICD-10-CM

## 2025-01-13 DIAGNOSIS — M1A.9XX0 CHRONIC GOUT WITHOUT TOPHUS, UNSPECIFIED CAUSE, UNSPECIFIED SITE: ICD-10-CM

## 2025-01-13 DIAGNOSIS — I48.0 PAROXYSMAL ATRIAL FIBRILLATION (HCC): ICD-10-CM

## 2025-01-14 RX ORDER — ALLOPURINOL 100 MG/1
100 TABLET ORAL DAILY
Qty: 90 TABLET | Refills: 1 | Status: SHIPPED | OUTPATIENT
Start: 2025-01-14

## 2025-01-14 RX ORDER — TAMSULOSIN HYDROCHLORIDE 0.4 MG/1
0.4 CAPSULE ORAL DAILY
Qty: 90 CAPSULE | Refills: 3 | Status: SHIPPED | OUTPATIENT
Start: 2025-01-14

## 2025-01-23 ENCOUNTER — HOSPITAL ENCOUNTER (OUTPATIENT)
Facility: HOSPITAL | Age: 70
Setting detail: SPECIMEN
Discharge: HOME | End: 2025-01-26
Payer: MEDICARE

## 2025-01-23 DIAGNOSIS — I10 ESSENTIAL HYPERTENSION: ICD-10-CM

## 2025-01-23 DIAGNOSIS — E11.9 TYPE 2 DIABETES MELLITUS WITHOUT COMPLICATION, WITH LONG-TERM CURRENT USE OF INSULIN (HCC): ICD-10-CM

## 2025-01-23 DIAGNOSIS — E55.9 VITAMIN D DEFICIENCY: ICD-10-CM

## 2025-01-23 DIAGNOSIS — Z79.4 TYPE 2 DIABETES MELLITUS WITHOUT COMPLICATION, WITH LONG-TERM CURRENT USE OF INSULIN (HCC): ICD-10-CM

## 2025-01-23 DIAGNOSIS — E53.8 B12 DEFICIENCY: ICD-10-CM

## 2025-01-23 DIAGNOSIS — J43.9 PULMONARY EMPHYSEMA, UNSPECIFIED EMPHYSEMA TYPE (HCC): ICD-10-CM

## 2025-01-23 LAB
ALBUMIN SERPL-MCNC: 4 G/DL (ref 3.4–5)
ALBUMIN/GLOB SERPL: 1.1 (ref 0.8–1.7)
ALP SERPL-CCNC: 82 U/L (ref 45–117)
ALT SERPL-CCNC: 30 U/L (ref 16–61)
ANION GAP SERPL CALC-SCNC: 4 MMOL/L (ref 3–18)
AST SERPL-CCNC: 17 U/L (ref 10–38)
BILIRUB SERPL-MCNC: 0.5 MG/DL (ref 0.2–1)
BUN SERPL-MCNC: 17 MG/DL (ref 7–18)
BUN/CREAT SERPL: 15 (ref 12–20)
CALCIUM SERPL-MCNC: 9 MG/DL (ref 8.5–10.1)
CHLORIDE SERPL-SCNC: 104 MMOL/L (ref 100–111)
CHOLEST SERPL-MCNC: 148 MG/DL
CO2 SERPL-SCNC: 33 MMOL/L (ref 21–32)
CREAT SERPL-MCNC: 1.16 MG/DL (ref 0.6–1.3)
CREAT UR-MCNC: 18 MG/DL (ref 30–125)
ERYTHROCYTE [DISTWIDTH] IN BLOOD BY AUTOMATED COUNT: 14.4 % (ref 11.6–14.5)
EST. AVERAGE GLUCOSE BLD GHB EST-MCNC: 200 MG/DL
GLOBULIN SER CALC-MCNC: 3.7 G/DL (ref 2–4)
GLUCOSE SERPL-MCNC: 141 MG/DL (ref 74–99)
HBA1C MFR BLD: 8.6 % (ref 4.2–5.6)
HCT VFR BLD AUTO: 47.8 % (ref 36–48)
HDLC SERPL-MCNC: 86 MG/DL (ref 40–60)
HDLC SERPL: 1.7 (ref 0–5)
HGB BLD-MCNC: 15.6 G/DL (ref 13–16)
LDLC SERPL CALC-MCNC: 42.8 MG/DL (ref 0–100)
LIPID PANEL: ABNORMAL
MCH RBC QN AUTO: 31.8 PG (ref 24–34)
MCHC RBC AUTO-ENTMCNC: 32.6 G/DL (ref 31–37)
MCV RBC AUTO: 97.6 FL (ref 78–100)
MICROALBUMIN UR-MCNC: 4.58 MG/DL (ref 0–3)
MICROALBUMIN/CREAT UR-RTO: 254 MG/G (ref 0–30)
NRBC # BLD: 0 K/UL (ref 0–0.01)
NRBC BLD-RTO: 0 PER 100 WBC
PLATELET # BLD AUTO: 225 K/UL (ref 135–420)
PMV BLD AUTO: 10.4 FL (ref 9.2–11.8)
POTASSIUM SERPL-SCNC: 3.8 MMOL/L (ref 3.5–5.5)
PROT SERPL-MCNC: 7.7 G/DL (ref 6.4–8.2)
RBC # BLD AUTO: 4.9 M/UL (ref 4.35–5.65)
SODIUM SERPL-SCNC: 141 MMOL/L (ref 136–145)
TRIGL SERPL-MCNC: 96 MG/DL
VIT B12 SERPL-MCNC: 745 PG/ML (ref 211–911)
VLDLC SERPL CALC-MCNC: 19.2 MG/DL
WBC # BLD AUTO: 6.2 K/UL (ref 4.6–13.2)

## 2025-01-23 PROCEDURE — 82043 UR ALBUMIN QUANTITATIVE: CPT

## 2025-01-23 PROCEDURE — 82570 ASSAY OF URINE CREATININE: CPT

## 2025-01-23 PROCEDURE — 82607 VITAMIN B-12: CPT

## 2025-01-23 PROCEDURE — 82306 VITAMIN D 25 HYDROXY: CPT

## 2025-01-23 PROCEDURE — 80061 LIPID PANEL: CPT

## 2025-01-23 PROCEDURE — 85027 COMPLETE CBC AUTOMATED: CPT

## 2025-01-23 PROCEDURE — 83036 HEMOGLOBIN GLYCOSYLATED A1C: CPT

## 2025-01-23 PROCEDURE — 80053 COMPREHEN METABOLIC PANEL: CPT

## 2025-01-23 PROCEDURE — 36415 COLL VENOUS BLD VENIPUNCTURE: CPT

## 2025-01-24 LAB — 25(OH)D3 SERPL-MCNC: 27.1 NG/ML (ref 30–100)

## 2025-01-30 ENCOUNTER — OFFICE VISIT (OUTPATIENT)
Facility: CLINIC | Age: 70
End: 2025-01-30
Payer: MEDICARE

## 2025-01-30 VITALS
SYSTOLIC BLOOD PRESSURE: 125 MMHG | HEART RATE: 93 BPM | WEIGHT: 257 LBS | RESPIRATION RATE: 18 BRPM | HEIGHT: 70 IN | TEMPERATURE: 97.4 F | BODY MASS INDEX: 36.79 KG/M2 | DIASTOLIC BLOOD PRESSURE: 80 MMHG | OXYGEN SATURATION: 96 %

## 2025-01-30 DIAGNOSIS — I10 ESSENTIAL HYPERTENSION: Primary | ICD-10-CM

## 2025-01-30 DIAGNOSIS — Z79.4 TYPE 2 DIABETES MELLITUS WITHOUT COMPLICATION, WITH LONG-TERM CURRENT USE OF INSULIN (HCC): ICD-10-CM

## 2025-01-30 DIAGNOSIS — E78.2 MIXED HYPERLIPIDEMIA: ICD-10-CM

## 2025-01-30 DIAGNOSIS — E55.9 VITAMIN D DEFICIENCY: ICD-10-CM

## 2025-01-30 DIAGNOSIS — E11.9 TYPE 2 DIABETES MELLITUS WITHOUT COMPLICATION, WITH LONG-TERM CURRENT USE OF INSULIN (HCC): ICD-10-CM

## 2025-01-30 PROCEDURE — 3079F DIAST BP 80-89 MM HG: CPT | Performed by: NURSE PRACTITIONER

## 2025-01-30 PROCEDURE — 3052F HG A1C>EQUAL 8.0%<EQUAL 9.0%: CPT | Performed by: NURSE PRACTITIONER

## 2025-01-30 PROCEDURE — G8427 DOCREV CUR MEDS BY ELIG CLIN: HCPCS | Performed by: NURSE PRACTITIONER

## 2025-01-30 PROCEDURE — 1036F TOBACCO NON-USER: CPT | Performed by: NURSE PRACTITIONER

## 2025-01-30 PROCEDURE — 99214 OFFICE O/P EST MOD 30 MIN: CPT | Performed by: NURSE PRACTITIONER

## 2025-01-30 PROCEDURE — 2022F DILAT RTA XM EVC RTNOPTHY: CPT | Performed by: NURSE PRACTITIONER

## 2025-01-30 PROCEDURE — 1123F ACP DISCUSS/DSCN MKR DOCD: CPT | Performed by: NURSE PRACTITIONER

## 2025-01-30 PROCEDURE — G8417 CALC BMI ABV UP PARAM F/U: HCPCS | Performed by: NURSE PRACTITIONER

## 2025-01-30 PROCEDURE — 3017F COLORECTAL CA SCREEN DOC REV: CPT | Performed by: NURSE PRACTITIONER

## 2025-01-30 PROCEDURE — 3074F SYST BP LT 130 MM HG: CPT | Performed by: NURSE PRACTITIONER

## 2025-01-30 RX ORDER — CHOLECALCIFEROL (VITAMIN D3) 1250 MCG
1 CAPSULE ORAL
Qty: 12 CAPSULE | Refills: 0 | Status: SHIPPED | OUTPATIENT
Start: 2025-01-30

## 2025-01-30 SDOH — ECONOMIC STABILITY: FOOD INSECURITY: WITHIN THE PAST 12 MONTHS, THE FOOD YOU BOUGHT JUST DIDN'T LAST AND YOU DIDN'T HAVE MONEY TO GET MORE.: NEVER TRUE

## 2025-01-30 SDOH — ECONOMIC STABILITY: FOOD INSECURITY: WITHIN THE PAST 12 MONTHS, YOU WORRIED THAT YOUR FOOD WOULD RUN OUT BEFORE YOU GOT MONEY TO BUY MORE.: NEVER TRUE

## 2025-01-30 ASSESSMENT — PATIENT HEALTH QUESTIONNAIRE - PHQ9
SUM OF ALL RESPONSES TO PHQ QUESTIONS 1-9: 0
SUM OF ALL RESPONSES TO PHQ9 QUESTIONS 1 & 2: 0
SUM OF ALL RESPONSES TO PHQ QUESTIONS 1-9: 0
1. LITTLE INTEREST OR PLEASURE IN DOING THINGS: NOT AT ALL
SUM OF ALL RESPONSES TO PHQ QUESTIONS 1-9: 0
2. FEELING DOWN, DEPRESSED OR HOPELESS: NOT AT ALL
SUM OF ALL RESPONSES TO PHQ QUESTIONS 1-9: 0

## 2025-01-30 NOTE — PROGRESS NOTES
Room 7,8.,9    Maria Elena Willoughby had concerns including Follow-up Chronic Condition, Diabetes, Hypertension, and Cholesterol Problem. for today's visit .     When asked if patient has any concerns she/he would like to address with SAMI Sanchez patient states no .       Did you take your medication today? Patient sates yes       1. \"Have you been to the ER, urgent care clinic since your last visit?  Hospitalized since your last visit?\" .NO    2. \"Have you seen or consulted any other health care providers outside of the [facility] since your last visit?\" No     3. For patients aged 45-75: Has the patient had a colonoscopy / FIT/ Cologuard? Yes      If the patient is female:    4. For patients aged 40-74: Has the patient had a mammogram within the past 2 years? N/S      5. For patients aged 21-65: Has the patient had a pap smear? {Cancer Care Gap present? N/A              1/30/2025     1:51 PM   PHQ-9    Little interest or pleasure in doing things 0   Feeling down, depressed, or hopeless 0   PHQ-2 Score 0   PHQ-9 Total Score 0          Health Maintenance Due   Topic Date Due    Shingles vaccine (1 of 2) Never done    Respiratory Syncytial Virus (RSV) Pregnant or age 60 yrs+ (1 - Risk 60-74 years 1-dose series) Never done    Pneumococcal 65+ years Vaccine (2 of 2 - PCV) 02/03/2015    Diabetic retinal exam  02/02/2022    COVID-19 Vaccine (6 - 2023-24 season) 09/01/2024

## 2025-01-30 NOTE — PROGRESS NOTES
[address] [place] [address]               [phone]      Maria Elena Willoughby is a 70 y.o. male and presents with Follow-up Chronic Condition, Diabetes, Hypertension, and Cholesterol Problem       Assessment/Plan:    1. Essential hypertension  -     Comprehensive Metabolic Panel; Future  -     CBC; Future  2. Mixed hyperlipidemia  -     Lipid Panel; Future  3. Vitamin D deficiency  -     Cholecalciferol (VITAMIN D3) 1.25 MG (58543 UT) CAPS; Take 1 capsule by mouth every 7 days, Disp-12 capsule, R-0Normal  -     Vitamin D 25 Hydroxy; Future  4. Type 2 diabetes mellitus without complication, with long-term current use of insulin (HCC)  Overview:  Managed by Dr. Fidel cabrera 14day      Orders:  -     Hemoglobin A1C; Future     Blood pressure controlled, continue metoprolol, spironolactone at same dose  Lipids controlled LDL 42  Refills provided  Labs prior to next visit  Patient verbalized understanding and is in agreement with this plan of care     Follow up and disposition:   Return in about 4 months (around 5/30/2025) for MAWV, DM, HTN, HLD, 30min, office, w/labsprior.      Subjective:    Labs obtained prior to visit? Yes  Reviewed with patient? yes    DMII-   Patient reports medication compliance Yes  Diabetic diet compliance frequently  Patient monitors blood sugars regularly  cgm   Home glucose readings: 3/4 time in range   Denies hypoglycemic episodes Yes  Denies polyuria, polydipsia, paraesthesia, vision changes? Yes  Engaging in daily exercise? Routine     Diabetes Management   Topic Date Due    Diabetic retinal exam  02/02/2022     Lab Results   Component Value Date/Time    LABA1C 8.6 01/23/2025 02:00 PM   ]  No results found for: \"MALBCR\"]  Diabetic medications:   Diabetic Medications       Biguanides       metFORMIN (GLUCOPHAGE) 850 MG tablet Take 1 tablet by mouth 2 times daily (with meals)       Incretin Mimetic Agents       Semaglutide, 1 MG/DOSE, (OZEMPIC, 1

## 2025-01-31 NOTE — TELEPHONE ENCOUNTER
PATIENT REQUESTING REFILL  Requested Prescriptions     Pending Prescriptions Disp Refills    fluticasone propion-salmeteroL (Advair HFA) 115-21 mcg/actuation inhaler 1 Each 3     Sig: Take 2 Puffs by inhalation two (2) times a day. ipratropium-albuteroL (Combivent Respimat)  mcg/actuation inhaler 4 g 2     Sig: Take 1 Puff by inhalation every six (6) hours as needed for Wheezing or Shortness of Breath.
stretcher

## 2025-04-29 ENCOUNTER — OFFICE VISIT (OUTPATIENT)
Facility: CLINIC | Age: 70
End: 2025-04-29

## 2025-04-29 ENCOUNTER — ENROLLMENT (OUTPATIENT)
Facility: CLINIC | Age: 70
End: 2025-04-29

## 2025-04-29 ENCOUNTER — CARE COORDINATION (OUTPATIENT)
Facility: CLINIC | Age: 70
End: 2025-04-29

## 2025-04-29 VITALS
TEMPERATURE: 98 F | HEART RATE: 95 BPM | WEIGHT: 257.4 LBS | RESPIRATION RATE: 16 BRPM | DIASTOLIC BLOOD PRESSURE: 90 MMHG | OXYGEN SATURATION: 95 % | SYSTOLIC BLOOD PRESSURE: 153 MMHG | BODY MASS INDEX: 36.85 KG/M2 | HEIGHT: 70 IN

## 2025-04-29 DIAGNOSIS — Z09 HOSPITAL DISCHARGE FOLLOW-UP: ICD-10-CM

## 2025-04-29 DIAGNOSIS — R13.10 DYSPHAGIA, UNSPECIFIED TYPE: ICD-10-CM

## 2025-04-29 DIAGNOSIS — R29.898 RIGHT ARM WEAKNESS: Primary | ICD-10-CM

## 2025-04-29 DIAGNOSIS — M54.2 NECK PAIN ON LEFT SIDE: ICD-10-CM

## 2025-04-29 DIAGNOSIS — G89.29 CHRONIC MIDLINE LOW BACK PAIN WITHOUT SCIATICA: ICD-10-CM

## 2025-04-29 DIAGNOSIS — M25.511 CHRONIC RIGHT SHOULDER PAIN: ICD-10-CM

## 2025-04-29 DIAGNOSIS — G89.29 CHRONIC RIGHT SHOULDER PAIN: ICD-10-CM

## 2025-04-29 DIAGNOSIS — M54.50 CHRONIC MIDLINE LOW BACK PAIN WITHOUT SCIATICA: ICD-10-CM

## 2025-04-29 RX ORDER — LIDOCAINE 50 MG/G
1 PATCH TOPICAL DAILY
Qty: 30 PATCH | Refills: 0 | Status: SHIPPED | OUTPATIENT
Start: 2025-04-29 | End: 2025-05-29

## 2025-04-29 SDOH — ECONOMIC STABILITY: FOOD INSECURITY: WITHIN THE PAST 12 MONTHS, YOU WORRIED THAT YOUR FOOD WOULD RUN OUT BEFORE YOU GOT MONEY TO BUY MORE.: NEVER TRUE

## 2025-04-29 SDOH — ECONOMIC STABILITY: FOOD INSECURITY: WITHIN THE PAST 12 MONTHS, THE FOOD YOU BOUGHT JUST DIDN'T LAST AND YOU DIDN'T HAVE MONEY TO GET MORE.: NEVER TRUE

## 2025-04-29 ASSESSMENT — PATIENT HEALTH QUESTIONNAIRE - PHQ9
2. FEELING DOWN, DEPRESSED OR HOPELESS: NOT AT ALL
SUM OF ALL RESPONSES TO PHQ QUESTIONS 1-9: 0
SUM OF ALL RESPONSES TO PHQ QUESTIONS 1-9: 0
1. LITTLE INTEREST OR PLEASURE IN DOING THINGS: NOT AT ALL

## 2025-04-29 NOTE — CARE COORDINATION
Care Transitions Note    Initial Call - Call within 2 business days of discharge: Yes    Patient Current Location:  Home: [address] [place] [id number]    Care Transition Nurse contacted the patient by telephone to perform post hospital discharge assessment, verified name and  as identifiers.  Provided introduction to self, and explanation of the Care Transition Nurse role.    Patient: Maria Elena Willoughby    Patient : 1955   MRN: [id number]    Reason for Admission: Right sided weakness    Discharge Date:    RURS: No data recorded    Last Discharge Facility       None            Was this an external facility discharge? Yes. Discharge Date: 25. Facility Name: [facility].     Additional needs identified to be addressed with provider   No needs identified             Method of communication with provider: none.    Patients top risk factors for readmission: recent hospital admission.     Interventions to address risk factors:   Continue to take all medications as prescribed. Call CTN, PCP or specialist office for any questions, concerns and/or needs.      Care Summary Note:     Patient reports that he is doing alright.   No new or worsening of symptoms reported by Pt. At this time.   Patient reports that he received his discharged paper works and has all his medications.   Patient is aware of PCP appt. Today.   Patient kept the conversation short as he states that he is trying to get ready for his appt.   CTN reminded Patient to review medications with his PCP. Pt. States that he will do so.   CTN also reminded Patient to bring all his medications and discharge paper works to PCP office. Pt. Verbalized understanding.     Patient provided verbal permission to speak with Lia Willoughby, patient to discuss patient's health and provide information during 30-day transition of care period.      No questions, concerns and/or needs at this time as per Pt.     Future Appointments         Provider

## 2025-04-29 NOTE — PROGRESS NOTES
Maria Elena Willoughby is a 70 y.o. year old male who presents today for   Chief Complaint   Patient presents with    Follow-Up from Hospital       \"Have you been to the ER, urgent care clinic since your last visit?  Hospitalized since your last visit?\"    Yes headach, neck, shoulder,     “Have you seen or consulted any other health care providers outside our system since your last visit?”    no      “Have you had a diabetic eye exam?”    YES - Where: on [facility] in [place]  Nurse/CMA to request most recent records if not in the chart     Date of last diabetic eye exam: 2/2/2021         Click Here for Release of Records Request  
packs/day: 0.00     Average packs/day: 0.5 packs/day for 15.0 years (7.5 ttl pk-yrs)     Types: Cigarettes     Start date: 1999     Quit date: 2014     Years since quittin.3     Passive exposure: Past    Smokeless tobacco: Never   Substance Use Topics    Alcohol use: Not Currently     Alcohol/week: 1.0 standard drink of alcohol     Types: 1 Standard drinks or equivalent per week     Comment: Occasional drinker    Drug use: No       Medications/Allergies:  Current Outpatient Medications on File Prior to Visit   Medication Sig Dispense Refill    Cholecalciferol (VITAMIN D3) 1.25 MG (58825 UT) CAPS Take 1 capsule by mouth every 7 days 12 capsule 0    allopurinol (ZYLOPRIM) 100 MG tablet Take 1 tablet by mouth daily 90 tablet 1    rivaroxaban (XARELTO) 20 MG TABS tablet Take 1 tablet by mouth daily (with breakfast) 90 tablet 1    tamsulosin (FLOMAX) 0.4 MG capsule Take 1 capsule by mouth daily 90 capsule 3    Blood Glucose Monitoring Suppl (FREESTYLE FREEDOM LITE) w/Device KIT Use to check glucose daily 1 kit 0    FreeStyle Lancets MISC 1 each by Does not apply route daily 100 each 3    blood glucose test strips (FREESTYLE LITE) strip 1 each by In Vitro route daily As needed. 100 each 3    BUDESONIDE PO Take 4.5 mcg by mouth      rosuvastatin (CRESTOR) 20 MG tablet Take 1 tablet by mouth daily      polyethylene glycol (GLYCOLAX) 17 GM/SCOOP powder Take 17 g by mouth daily      fluticasone (FLONASE) 50 MCG/ACT nasal spray 1 spray by Each Nostril route daily as needed for Rhinitis 16 g 3    fluticasone furoate-vilanterol (BREO ELLIPTA) 100-25 MCG/ACT inhaler Inhale 1 puff into the lungs daily 1 each 3    vitamin B-12 (CYANOCOBALAMIN) 1000 MCG tablet Take 1 tablet by mouth daily 90 tablet 1    cholecalciferol (VITAMIN D3) 400 UNIT TABS tablet Take 1 tablet by mouth daily 90 tablet 1    Semaglutide, 1 MG/DOSE, (OZEMPIC, 1 MG/DOSE,) 4 MG/3ML SOPN sc injection Inject 1 mg into the skin every 7 days 9 mL 0

## 2025-05-06 ENCOUNTER — CARE COORDINATION (OUTPATIENT)
Facility: CLINIC | Age: 70
End: 2025-05-06

## 2025-05-06 NOTE — CARE COORDINATION
Care Transitions Note    Follow Up Call     Attempted to reach patient for transitions of care follow up.  Unable to reach patient.      Outreach Attempts:   HIPAA compliant voicemail left for patient.     Care Summary Note:     Noted Patient attended PCP appt. On 4/29/25.     Follow Up Appointment:   Future Appointments         Provider Specialty Dept Phone    5/23/2025 3:15 PM [facility] [phone]    7/21/2025 8:30 AM Isabel Sanchez, APRN - CNP Family Medicine [phone]            Plan for follow-up call in 6-10 days based on severity of symptoms and risk factors. Plan for next call:  Follow up symptoms, follow up up if patient started outpatient PT, assess for any needs.     Ronen Almaguer RN

## 2025-05-14 ENCOUNTER — CARE COORDINATION (OUTPATIENT)
Facility: CLINIC | Age: 70
End: 2025-05-14

## 2025-05-14 NOTE — CARE COORDINATION
Care Transitions Note    Follow Up Call     Patient Current Location:  Home: [address] [address]    LPN Care Coordinator contacted the patient by telephone. Verified name and  as identifiers.    Additional needs identified to be addressed with provider   No needs identified                 Method of communication with provider: none.    Care Summary Note:   Spoke with patient.  Patient states he is doing alright.  Patient states he has started physical therapy.  Patient has no questions or concerns.    Advance Care Planning:   Does patient have an Advance Directive: patient declined education.    Medication Review:  No changes since last call.     Remote Patient Monitoring:  Offered patient enrollment in the Remote Patient Monitoring (RPM) program for in-home monitoring: Yes, but did not enroll at this time: controlled chronic disease management.    Assessments:  No changes since last call    Follow Up Appointment:   Reviewed upcoming appointment(s).  Future Appointments         Provider Specialty Dept Phone    2025 3:15 PM [facility] [phone]    2025 8:30 AM Isabel Sanchez, APRN - CNP Family Medicine [phone]            LP[facility] Care Coordinator provided contact information.  Plan for follow-up call in 6-10 days based on severity of symptoms and risk factors.  Plan for next call:  assess for any needs      Halina Rivas LPN

## 2025-05-20 ENCOUNTER — CARE COORDINATION (OUTPATIENT)
Facility: CLINIC | Age: 70
End: 2025-05-20

## 2025-05-20 NOTE — CARE COORDINATION
Care Transitions Note    Follow Up Call     Patient Current Location:  Home: [address] [address]    LPN Care Coordinator contacted the patient by telephone. Verified name and  as identifiers.    Additional needs identified to be addressed with provider   No needs identified                 Method of communication with provider: none.    Care Summary Note:   Spoke with patient.  Patient states he is a little dizzy today.  Patient states he has been out and returned he was dizzy.  Patient states he hasn't eaten but had some water.  Patient will get something to eat and more fluids.  Patient agree to check his blood pressure.  Patient advised to seek medical attention if symptoms worsens. Patient verbalizes understanding.    Advance Care Planning:   Does patient have an Advance Directive: reviewed during previous call, see note. .    Medication Review:  No changes since last call.     Follow Up Appointment:   Reviewed upcoming appointment(s).  Future Appointments         Provider Specialty Dept Phone    2025 3:15 PM [facility] [phone]    2025 8:30 AM Isabel Sanchez, APRN - CNP Family Medicine [phone]            LP[facility] Care Coordinator provided contact information.  Plan for follow-up call in 6-10 days based on severity of symptoms and risk factors.  Plan for next call: final call     Halina Rivas LPN

## 2025-05-23 ENCOUNTER — HOSPITAL ENCOUNTER (OUTPATIENT)
Facility: HOSPITAL | Age: 70
Setting detail: SPECIMEN
Discharge: HOME | End: 2025-05-26
Payer: MEDICARE

## 2025-05-23 DIAGNOSIS — E11.9 TYPE 2 DIABETES MELLITUS WITHOUT COMPLICATION, WITH LONG-TERM CURRENT USE OF INSULIN (HCC): ICD-10-CM

## 2025-05-23 DIAGNOSIS — E78.2 MIXED HYPERLIPIDEMIA: ICD-10-CM

## 2025-05-23 DIAGNOSIS — Z79.4 TYPE 2 DIABETES MELLITUS WITHOUT COMPLICATION, WITH LONG-TERM CURRENT USE OF INSULIN (HCC): ICD-10-CM

## 2025-05-23 DIAGNOSIS — E55.9 VITAMIN D DEFICIENCY: ICD-10-CM

## 2025-05-23 DIAGNOSIS — I10 ESSENTIAL HYPERTENSION: ICD-10-CM

## 2025-05-23 LAB
25(OH)D3 SERPL-MCNC: 53 NG/ML (ref 30–100)
ALBUMIN SERPL-MCNC: 3.8 G/DL (ref 3.4–5)
ALBUMIN/GLOB SERPL: 1.2 (ref 0.8–1.7)
ALP SERPL-CCNC: 80 U/L (ref 45–117)
ALT SERPL-CCNC: 20 U/L (ref 10–50)
ANION GAP SERPL CALC-SCNC: 15 MMOL/L (ref 3–18)
AST SERPL-CCNC: 18 U/L (ref 10–38)
BILIRUB SERPL-MCNC: 0.4 MG/DL (ref 0.2–1)
BUN SERPL-MCNC: 21 MG/DL (ref 6–23)
BUN/CREAT SERPL: 16 (ref 12–20)
CALCIUM SERPL-MCNC: 9.9 MG/DL (ref 8.5–10.1)
CHLORIDE SERPL-SCNC: 99 MMOL/L (ref 98–107)
CHOLEST SERPL-MCNC: 172 MG/DL
CO2 SERPL-SCNC: 32 MMOL/L (ref 21–32)
CREAT SERPL-MCNC: 1.25 MG/DL (ref 0.6–1.3)
ERYTHROCYTE [DISTWIDTH] IN BLOOD BY AUTOMATED COUNT: 14 % (ref 11.6–14.5)
EST. AVERAGE GLUCOSE BLD GHB EST-MCNC: 214 MG/DL
GLOBULIN SER CALC-MCNC: 3.3 G/DL (ref 2–4)
GLUCOSE SERPL-MCNC: 107 MG/DL (ref 74–108)
HBA1C MFR BLD: 9.1 % (ref 4.2–5.6)
HCT VFR BLD AUTO: 49.4 % (ref 36–48)
HDLC SERPL-MCNC: 86 MG/DL (ref 40–60)
HDLC SERPL: 2 (ref 0–5)
HGB BLD-MCNC: 15.5 G/DL (ref 13–16)
LDLC SERPL CALC-MCNC: 67 MG/DL (ref 0–100)
MCH RBC QN AUTO: 30.3 PG (ref 24–34)
MCHC RBC AUTO-ENTMCNC: 31.4 G/DL (ref 31–37)
MCV RBC AUTO: 96.5 FL (ref 78–100)
NRBC # BLD: 0 K/UL (ref 0–0.01)
NRBC BLD-RTO: 0 PER 100 WBC
PLATELET # BLD AUTO: 159 K/UL (ref 135–420)
PMV BLD AUTO: 10.6 FL (ref 9.2–11.8)
POTASSIUM SERPL-SCNC: 3.9 MMOL/L (ref 3.5–5.5)
PROT SERPL-MCNC: 7.1 G/DL (ref 6.4–8.2)
RBC # BLD AUTO: 5.12 M/UL (ref 4.35–5.65)
SODIUM SERPL-SCNC: 146 MMOL/L (ref 136–145)
TRIGL SERPL-MCNC: 95 MG/DL (ref 0–150)
VLDLC SERPL CALC-MCNC: 19 MG/DL
WBC # BLD AUTO: 9.2 K/UL (ref 4.6–13.2)

## 2025-05-23 PROCEDURE — 80053 COMPREHEN METABOLIC PANEL: CPT

## 2025-05-23 PROCEDURE — 36415 COLL VENOUS BLD VENIPUNCTURE: CPT

## 2025-05-23 PROCEDURE — 85027 COMPLETE CBC AUTOMATED: CPT

## 2025-05-23 PROCEDURE — 83036 HEMOGLOBIN GLYCOSYLATED A1C: CPT

## 2025-05-23 PROCEDURE — 82306 VITAMIN D 25 HYDROXY: CPT

## 2025-05-23 PROCEDURE — 80061 LIPID PANEL: CPT

## 2025-05-29 ENCOUNTER — CARE COORDINATION (OUTPATIENT)
Facility: CLINIC | Age: 70
End: 2025-05-29

## 2025-05-29 NOTE — CARE COORDINATION
Care Transitions Note    Final Call     Patient Current Location:  Home: [address] [address]    LPN Care Coordinator contacted the patient by telephone. Verified name and  as identifiers.    Patient graduated from the Care Transitions program on 25.  Patient/family has the ability to self-manage at this time..      Advance Care Planning:   Does patient have an Advance Directive: reviewed during previous call, see note. .    Handoff:   Patient was not referred to the ACM team due to no additional needs identified.       Care Summary Note:   Spoke with patient.  Patient states he is doing good.  Patient has no questions or concerns.    Assessments:  Care Transitions Subsequent and Final Call    Subsequent and Final Calls  Do you have any ongoing symptoms?: No  Have your medications changed?: No  Do you have any questions related to your medications?: No  Do you currently have any active services?: No  Do you have any needs or concerns that I can assist you with?: No  Identified Barriers: None  Care Transitions Interventions  No Identified Needs  Other Interventions:              Upcoming Appointments:    Future Appointments         Provider Specialty Dept Phone    2025 11:45 AM [facility] [phone]    2025 8:30 AM Isabel Sanchez, APRN - CNP Family Medicine [phone]            Patient has agreed to contact primary care provider and/or specialist for any further questions, concerns, or needs.    Halina Rivas LPN

## 2025-07-21 ENCOUNTER — OFFICE VISIT (OUTPATIENT)
Facility: CLINIC | Age: 70
End: 2025-07-21
Payer: MEDICARE

## 2025-07-21 VITALS
HEIGHT: 70 IN | RESPIRATION RATE: 16 BRPM | WEIGHT: 254 LBS | TEMPERATURE: 97.7 F | DIASTOLIC BLOOD PRESSURE: 88 MMHG | SYSTOLIC BLOOD PRESSURE: 136 MMHG | HEART RATE: 88 BPM | BODY MASS INDEX: 36.36 KG/M2 | OXYGEN SATURATION: 96 %

## 2025-07-21 DIAGNOSIS — J43.9 PULMONARY EMPHYSEMA, UNSPECIFIED EMPHYSEMA TYPE (HCC): ICD-10-CM

## 2025-07-21 DIAGNOSIS — E55.9 VITAMIN D DEFICIENCY: ICD-10-CM

## 2025-07-21 DIAGNOSIS — E11.9 TYPE 2 DIABETES MELLITUS WITHOUT COMPLICATION, WITH LONG-TERM CURRENT USE OF INSULIN (HCC): ICD-10-CM

## 2025-07-21 DIAGNOSIS — Z00.00 MEDICARE ANNUAL WELLNESS VISIT, SUBSEQUENT: Primary | ICD-10-CM

## 2025-07-21 DIAGNOSIS — Z71.89 ADVANCED CARE PLANNING/COUNSELING DISCUSSION: ICD-10-CM

## 2025-07-21 DIAGNOSIS — I10 ESSENTIAL HYPERTENSION: ICD-10-CM

## 2025-07-21 DIAGNOSIS — Z79.4 TYPE 2 DIABETES MELLITUS WITHOUT COMPLICATION, WITH LONG-TERM CURRENT USE OF INSULIN (HCC): ICD-10-CM

## 2025-07-21 DIAGNOSIS — R29.6 FALLS FREQUENTLY: ICD-10-CM

## 2025-07-21 DIAGNOSIS — I48.0 PAROXYSMAL ATRIAL FIBRILLATION (HCC): ICD-10-CM

## 2025-07-21 DIAGNOSIS — E78.2 MIXED HYPERLIPIDEMIA: ICD-10-CM

## 2025-07-21 DIAGNOSIS — C61 MALIGNANT NEOPLASM OF PROSTATE (HCC): ICD-10-CM

## 2025-07-21 DIAGNOSIS — I50.22 CHRONIC SYSTOLIC CHF (CONGESTIVE HEART FAILURE), NYHA CLASS 2 (HCC): ICD-10-CM

## 2025-07-21 PROCEDURE — 1036F TOBACCO NON-USER: CPT | Performed by: NURSE PRACTITIONER

## 2025-07-21 PROCEDURE — G8417 CALC BMI ABV UP PARAM F/U: HCPCS | Performed by: NURSE PRACTITIONER

## 2025-07-21 PROCEDURE — G8428 CUR MEDS NOT DOCUMENT: HCPCS | Performed by: NURSE PRACTITIONER

## 2025-07-21 PROCEDURE — 3075F SYST BP GE 130 - 139MM HG: CPT | Performed by: NURSE PRACTITIONER

## 2025-07-21 PROCEDURE — G0439 PPPS, SUBSEQ VISIT: HCPCS | Performed by: NURSE PRACTITIONER

## 2025-07-21 PROCEDURE — 1123F ACP DISCUSS/DSCN MKR DOCD: CPT | Performed by: NURSE PRACTITIONER

## 2025-07-21 PROCEDURE — 3017F COLORECTAL CA SCREEN DOC REV: CPT | Performed by: NURSE PRACTITIONER

## 2025-07-21 PROCEDURE — 3079F DIAST BP 80-89 MM HG: CPT | Performed by: NURSE PRACTITIONER

## 2025-07-21 PROCEDURE — 99214 OFFICE O/P EST MOD 30 MIN: CPT | Performed by: NURSE PRACTITIONER

## 2025-07-21 PROCEDURE — 2022F DILAT RTA XM EVC RTNOPTHY: CPT | Performed by: NURSE PRACTITIONER

## 2025-07-21 PROCEDURE — 1126F AMNT PAIN NOTED NONE PRSNT: CPT | Performed by: NURSE PRACTITIONER

## 2025-07-21 PROCEDURE — 3023F SPIROM DOC REV: CPT | Performed by: NURSE PRACTITIONER

## 2025-07-21 PROCEDURE — 3046F HEMOGLOBIN A1C LEVEL >9.0%: CPT | Performed by: NURSE PRACTITIONER

## 2025-07-21 RX ORDER — INSULIN GLULISINE 100 [IU]/ML
INJECTION, SOLUTION SUBCUTANEOUS
COMMUNITY

## 2025-07-21 RX ORDER — CHOLECALCIFEROL (VITAMIN D3) 1250 MCG
1 CAPSULE ORAL
Qty: 12 CAPSULE | Refills: 0 | Status: SHIPPED | OUTPATIENT
Start: 2025-07-21

## 2025-07-21 RX ORDER — PEN NEEDLE, DIABETIC 30 GX3/16"
NEEDLE, DISPOSABLE MISCELLANEOUS
Qty: 300 EACH | Refills: 1 | Status: SHIPPED | OUTPATIENT
Start: 2025-07-21

## 2025-07-21 RX ORDER — TRAMADOL HYDROCHLORIDE 50 MG/1
50 TABLET ORAL EVERY 6 HOURS PRN
Status: CANCELLED | OUTPATIENT
Start: 2025-07-21

## 2025-07-21 RX ORDER — PREDNISONE 20 MG/1
20 TABLET ORAL DAILY
Status: CANCELLED | OUTPATIENT
Start: 2025-07-21

## 2025-07-21 RX ORDER — FLUTICASONE FUROATE AND VILANTEROL 100; 25 UG/1; UG/1
1 POWDER RESPIRATORY (INHALATION) DAILY
Qty: 1 EACH | Refills: 3 | Status: SHIPPED | OUTPATIENT
Start: 2025-07-21

## 2025-07-21 RX ORDER — PREDNISONE 20 MG/1
20 TABLET ORAL DAILY
COMMUNITY

## 2025-07-21 RX ORDER — INSULIN GLARGINE 100 [IU]/ML
20 INJECTION, SOLUTION SUBCUTANEOUS NIGHTLY
COMMUNITY

## 2025-07-21 RX ORDER — POLYETHYLENE GLYCOL 3350 17 G/17G
17 POWDER, FOR SOLUTION ORAL DAILY
Qty: 510 G | Refills: 3 | Status: SHIPPED | OUTPATIENT
Start: 2025-07-21

## 2025-07-21 RX ORDER — ALBUTEROL SULFATE 90 UG/1
2 INHALANT RESPIRATORY (INHALATION) EVERY 6 HOURS PRN
Qty: 18 G | Refills: 3 | Status: SHIPPED | OUTPATIENT
Start: 2025-07-21

## 2025-07-21 RX ORDER — ROSUVASTATIN CALCIUM 20 MG/1
20 TABLET, COATED ORAL DAILY
Qty: 90 TABLET | Refills: 1 | Status: SHIPPED | OUTPATIENT
Start: 2025-07-21

## 2025-07-21 RX ORDER — CYCLOBENZAPRINE HCL 5 MG
5 TABLET ORAL EVERY 8 HOURS PRN
COMMUNITY

## 2025-07-21 RX ORDER — SACUBITRIL AND VALSARTAN 97; 103 MG/1; MG/1
1 TABLET, FILM COATED ORAL 2 TIMES DAILY
COMMUNITY
Start: 2024-08-14 | End: 2025-08-13

## 2025-07-21 RX ORDER — TRAMADOL HYDROCHLORIDE 50 MG/1
50 TABLET ORAL EVERY 6 HOURS PRN
COMMUNITY

## 2025-07-21 ASSESSMENT — PATIENT HEALTH QUESTIONNAIRE - PHQ9
SUM OF ALL RESPONSES TO PHQ QUESTIONS 1-9: 2
1. LITTLE INTEREST OR PLEASURE IN DOING THINGS: MORE THAN HALF THE DAYS
2. FEELING DOWN, DEPRESSED OR HOPELESS: NOT AT ALL

## 2025-07-21 ASSESSMENT — LIFESTYLE VARIABLES
HOW OFTEN DO YOU HAVE A DRINK CONTAINING ALCOHOL: MONTHLY OR LESS
HOW MANY STANDARD DRINKS CONTAINING ALCOHOL DO YOU HAVE ON A TYPICAL DAY: 1 OR 2

## 2025-07-21 NOTE — PROGRESS NOTES
ROOM 8 STUDENT NOT OK     Have you been to the ER, urgent care clinic since your last visit?  Hospitalized since your last visit?   NO    Have you seen or consulted any other health care providers outside our system since your last visit?   Yes- Ortho       “Have you had a diabetic eye exam?”    NO     Date of last diabetic eye exam: 2/2/2021            
  Weight: 115.2 kg (254 lb)   Height: 1.778 m (5' 10\")      Body mass index is 36.45 kg/m².        Physical Exam  - Respiratory: Clear to auscultation, no wheezing, rales or rhonchi  - Cardiovascular: Regular rate and rhythm, no murmurs, rubs, or gallops            No Known Allergies  Prior to Visit Medications    Medication Sig Taking? Authorizing Provider   traMADol (ULTRAM) 50 MG tablet Take 1 tablet by mouth every 6 hours as needed for Pain. Max Daily Amount: 200 mg Yes Hanna Benjamin MD   predniSONE (DELTASONE) 20 MG tablet Take 1 tablet by mouth daily Yes Hanna Benjamin MD   cyclobenzaprine (FLEXERIL) 5 MG tablet Take 1 tablet by mouth every 8 hours as needed for Muscle spasms Yes Hanna Benjamin MD   Cholecalciferol (VITAMIN D3) 1.25 MG (44692 UT) CAPS Take 1 capsule by mouth every 7 days Yes Isabel Sanchez APRN - CNP   allopurinol (ZYLOPRIM) 100 MG tablet Take 1 tablet by mouth daily Yes Isabel Sanchez APRN - CNP   rivaroxaban (XARELTO) 20 MG TABS tablet Take 1 tablet by mouth daily (with breakfast) Yes Isabel Sanchez APRN - CNP   tamsulosin (FLOMAX) 0.4 MG capsule Take 1 capsule by mouth daily Yes Isabel Sanchez APRN - CNP   Blood Glucose Monitoring Suppl (FREESTYLE FREEDOM LITE) w/Device KIT Use to check glucose daily Yes Isabel Sanchez APRN - CNP   FreeStyle Lancets MISC 1 each by Does not apply route daily Yes Isabel Sanchez APRN - CNP   blood glucose test strips (FREESTYLE LITE) strip 1 each by In Vitro route daily As needed. Yes Isabel Sanchez APRN - CNP   BUDESONIDE PO Take 4.5 mcg by mouth Yes Hanna Benjamin MD   rosuvastatin (CRESTOR) 20 MG tablet Take 1 tablet by mouth daily Yes Hanna Benjamin MD   polyethylene glycol (GLYCOLAX) 17 GM/SCOOP powder Take 17 g by mouth daily Yes Hanna Benjamin MD   fluticasone (FLONASE) 50 MCG/ACT nasal spray 1 spray by Each Nostril route daily as needed for Rhinitis Yes Daniel

## 2025-07-21 NOTE — ACP (ADVANCE CARE PLANNING)
Advance Care Planning   General Advance Care Planning (ACP) Conversation    Date of Conversation: 7/21/2025  Conducted with: Patient with Decision Making Capacity  Other persons present: Spouse cyndi garcia    Healthcare Decision Maker:    Primary Decision Maker: FartunCyndi - Spouse - [phone]      Content/Action Overview:  Has NO ACP documents-Information provided  Reviewed DNR/DNI and patient elects Full Code (Attempt Resuscitation)        Length of Voluntary ACP Conversation in minutes:  <16 minutes (Non-Billable)    Isabel Sanchez, APRN - CNP